# Patient Record
Sex: FEMALE | Race: WHITE | NOT HISPANIC OR LATINO | Employment: PART TIME | URBAN - METROPOLITAN AREA
[De-identification: names, ages, dates, MRNs, and addresses within clinical notes are randomized per-mention and may not be internally consistent; named-entity substitution may affect disease eponyms.]

---

## 2017-03-03 ENCOUNTER — ALLSCRIPTS OFFICE VISIT (OUTPATIENT)
Dept: OTHER | Facility: OTHER | Age: 42
End: 2017-03-03

## 2017-03-03 DIAGNOSIS — R19.5 OTHER FECAL ABNORMALITIES: ICD-10-CM

## 2017-03-03 DIAGNOSIS — F41.9 ANXIETY DISORDER: ICD-10-CM

## 2017-03-03 DIAGNOSIS — R74.02 NONSPECIFIC ELEVATION OF LEVELS OF TRANSAMINASE AND LACTIC ACID DEHYDROGENASE (LDH): ICD-10-CM

## 2017-03-03 DIAGNOSIS — F43.10 POST-TRAUMATIC STRESS DISORDER: ICD-10-CM

## 2017-03-03 DIAGNOSIS — R74.01 NONSPECIFIC ELEVATION OF LEVELS OF TRANSAMINASE AND LACTIC ACID DEHYDROGENASE (LDH): ICD-10-CM

## 2017-03-03 DIAGNOSIS — R10.9 ABDOMINAL PAIN: ICD-10-CM

## 2017-03-03 DIAGNOSIS — R68.89 OTHER GENERAL SYMPTOMS AND SIGNS: ICD-10-CM

## 2017-03-03 DIAGNOSIS — L65.9 NONSCARRING HAIR LOSS: ICD-10-CM

## 2017-03-03 DIAGNOSIS — E66.09 OTHER OBESITY DUE TO EXCESS CALORIES: ICD-10-CM

## 2017-03-03 DIAGNOSIS — K58.0 IRRITABLE BOWEL SYNDROME WITH DIARRHEA: ICD-10-CM

## 2017-03-03 DIAGNOSIS — G47.00 INSOMNIA: ICD-10-CM

## 2017-03-08 ENCOUNTER — LAB CONVERSION - ENCOUNTER (OUTPATIENT)
Dept: OTHER | Facility: OTHER | Age: 42
End: 2017-03-08

## 2017-03-08 ENCOUNTER — GENERIC CONVERSION - ENCOUNTER (OUTPATIENT)
Dept: OTHER | Facility: OTHER | Age: 42
End: 2017-03-08

## 2017-03-08 LAB
A/G RATIO (HISTORICAL): 1.6 (CALC) (ref 1–2.5)
ALBUMIN SERPL BCP-MCNC: 4.2 G/DL (ref 3.6–5.1)
ALP SERPL-CCNC: 65 U/L (ref 33–115)
ALT SERPL W P-5'-P-CCNC: 56 U/L (ref 6–29)
AST SERPL W P-5'-P-CCNC: 24 U/L (ref 10–30)
BASOPHILS # BLD AUTO: 0.6 %
BASOPHILS # BLD AUTO: 50 CELLS/UL (ref 0–200)
BILIRUB SERPL-MCNC: 0.5 MG/DL (ref 0.2–1.2)
BUN SERPL-MCNC: 9 MG/DL (ref 7–25)
BUN/CREA RATIO (HISTORICAL): ABNORMAL (CALC) (ref 6–22)
CALCIUM SERPL-MCNC: 9.1 MG/DL (ref 8.6–10.2)
CHLORIDE SERPL-SCNC: 104 MMOL/L (ref 98–110)
CHOLEST SERPL-MCNC: 212 MG/DL (ref 125–200)
CHOLEST/HDLC SERPL: 3.6 (CALC)
CO2 SERPL-SCNC: 26 MMOL/L (ref 20–31)
CREAT SERPL-MCNC: 0.74 MG/DL (ref 0.5–1.1)
DEPRECATED RDW RBC AUTO: 13.4 % (ref 11–15)
EGFR AFRICAN AMERICAN (HISTORICAL): 115 ML/MIN/1.73M2
EGFR-AMERICAN CALC (HISTORICAL): 99 ML/MIN/1.73M2
EOSINOPHIL # BLD AUTO: 1.7 %
EOSINOPHIL # BLD AUTO: 143 CELLS/UL (ref 15–500)
GAMMA GLOBULIN (HISTORICAL): 2.6 G/DL (CALC) (ref 1.9–3.7)
GLUCOSE (HISTORICAL): 93 MG/DL (ref 65–99)
HCT VFR BLD AUTO: 41.4 % (ref 35–45)
HDLC SERPL-MCNC: 59 MG/DL
HGB BLD-MCNC: 13.7 G/DL (ref 11.7–15.5)
LDL CHOLESTEROL (HISTORICAL): 137 MG/DL (CALC)
LYMPHOCYTES # BLD AUTO: 2520 CELLS/UL (ref 850–3900)
LYMPHOCYTES # BLD AUTO: 30 %
MCH RBC QN AUTO: 29.2 PG (ref 27–33)
MCHC RBC AUTO-ENTMCNC: 33 G/DL (ref 32–36)
MCV RBC AUTO: 88.6 FL (ref 80–100)
MONOCYTES # BLD AUTO: 487 CELLS/UL (ref 200–950)
MONOCYTES (HISTORICAL): 5.8 %
NEUTROPHILS # BLD AUTO: 5200 CELLS/UL (ref 1500–7800)
NEUTROPHILS # BLD AUTO: 61.9 %
NON-HDL-CHOL (CHOL-HDL) (HISTORICAL): 153 MG/DL (CALC)
PLATELET # BLD AUTO: 297 THOUSAND/UL (ref 140–400)
PMV BLD AUTO: 9.1 FL (ref 7.5–12.5)
POTASSIUM SERPL-SCNC: 4.8 MMOL/L (ref 3.5–5.3)
RBC # BLD AUTO: 4.67 MILLION/UL (ref 3.8–5.1)
SODIUM SERPL-SCNC: 138 MMOL/L (ref 135–146)
TOTAL PROTEIN (HISTORICAL): 6.8 G/DL (ref 6.1–8.1)
TRIGL SERPL-MCNC: 82 MG/DL
TSH SERPL DL<=0.05 MIU/L-ACNC: 0.88 MIU/L
WBC # BLD AUTO: 8.4 THOUSAND/UL (ref 3.8–10.8)

## 2017-03-15 ENCOUNTER — ALLSCRIPTS OFFICE VISIT (OUTPATIENT)
Dept: OTHER | Facility: OTHER | Age: 42
End: 2017-03-15

## 2017-03-22 ENCOUNTER — HOSPITAL ENCOUNTER (OUTPATIENT)
Dept: RADIOLOGY | Age: 42
Discharge: HOME | End: 2017-03-22
Payer: COMMERCIAL

## 2017-03-22 DIAGNOSIS — K58.0 IRRITABLE BOWEL SYNDROME WITH DIARRHEA: ICD-10-CM

## 2017-03-22 DIAGNOSIS — R74.01 NONSPECIFIC ELEVATION OF LEVELS OF TRANSAMINASE AND LACTIC ACID DEHYDROGENASE (LDH): ICD-10-CM

## 2017-03-22 DIAGNOSIS — R74.02 NONSPECIFIC ELEVATION OF LEVELS OF TRANSAMINASE AND LACTIC ACID DEHYDROGENASE (LDH): ICD-10-CM

## 2017-03-22 DIAGNOSIS — R10.9 ABDOMINAL PAIN: ICD-10-CM

## 2017-03-22 DIAGNOSIS — R19.5 OTHER FECAL ABNORMALITIES: ICD-10-CM

## 2017-03-22 PROCEDURE — 76700 US EXAM ABDOM COMPLETE: CPT

## 2017-03-24 ENCOUNTER — GENERIC CONVERSION - ENCOUNTER (OUTPATIENT)
Dept: OTHER | Facility: OTHER | Age: 42
End: 2017-03-24

## 2017-04-03 ENCOUNTER — TRANSCRIBE ORDERS (OUTPATIENT)
Dept: ADMINISTRATIVE | Facility: HOSPITAL | Age: 42
End: 2017-04-03

## 2017-04-03 ENCOUNTER — ALLSCRIPTS OFFICE VISIT (OUTPATIENT)
Dept: OTHER | Facility: OTHER | Age: 42
End: 2017-04-03

## 2017-04-03 DIAGNOSIS — R19.7 DIARRHEA: ICD-10-CM

## 2017-04-03 DIAGNOSIS — R74.01 NONSPECIFIC ELEVATION OF LEVELS OF TRANSAMINASE AND LACTIC ACID DEHYDROGENASE (LDH): ICD-10-CM

## 2017-04-03 DIAGNOSIS — K76.9 UNSPECIFIED CHRONIC LIVER DISEASE WITHOUT MENTION OF ALCOHOL: Primary | ICD-10-CM

## 2017-04-03 DIAGNOSIS — R74.02 NONSPECIFIC ELEVATION OF LEVELS OF TRANSAMINASE AND LACTIC ACID DEHYDROGENASE (LDH): ICD-10-CM

## 2017-04-07 ENCOUNTER — HOSPITAL ENCOUNTER (OUTPATIENT)
Dept: RADIOLOGY | Facility: HOSPITAL | Age: 42
Discharge: HOME | End: 2017-04-07
Attending: INTERNAL MEDICINE
Payer: COMMERCIAL

## 2017-04-07 DIAGNOSIS — K76.9 UNSPECIFIED CHRONIC LIVER DISEASE WITHOUT MENTION OF ALCOHOL: ICD-10-CM

## 2017-04-07 PROCEDURE — 74160 CT ABDOMEN W/CONTRAST: CPT

## 2017-04-07 RX ADMIN — IOHEXOL 100 ML: 350 INJECTION, SOLUTION INTRAVENOUS at 13:37

## 2017-04-10 ENCOUNTER — TRANSCRIBE ORDERS (OUTPATIENT)
Dept: LAB | Facility: HOSPITAL | Age: 42
End: 2017-04-10

## 2017-04-10 ENCOUNTER — APPOINTMENT (OUTPATIENT)
Dept: LAB | Facility: HOSPITAL | Age: 42
End: 2017-04-10
Attending: INTERNAL MEDICINE
Payer: COMMERCIAL

## 2017-04-10 DIAGNOSIS — R19.7 DIARRHEA, UNSPECIFIED TYPE: ICD-10-CM

## 2017-04-10 DIAGNOSIS — R19.7 DIARRHEA, UNSPECIFIED TYPE: Primary | ICD-10-CM

## 2017-04-10 DIAGNOSIS — R19.7 DIARRHEA: ICD-10-CM

## 2017-04-10 DIAGNOSIS — R74.01 NONSPECIFIC ELEVATION OF LEVELS OF TRANSAMINASE AND LACTIC ACID DEHYDROGENASE (LDH): ICD-10-CM

## 2017-04-10 DIAGNOSIS — R74.02 NONSPECIFIC ELEVATION OF LEVELS OF TRANSAMINASE AND LACTIC ACID DEHYDROGENASE (LDH): ICD-10-CM

## 2017-04-10 LAB
ALBUMIN SERPL BCP-MCNC: 4 G/DL (ref 3.5–5)
ALP SERPL-CCNC: 59 U/L (ref 46–116)
ALT SERPL W P-5'-P-CCNC: 31 U/L (ref 12–78)
ANION GAP SERPL CALCULATED.3IONS-SCNC: 8 MMOL/L (ref 4–13)
AST SERPL W P-5'-P-CCNC: 14 U/L (ref 5–45)
BILIRUB SERPL-MCNC: 0.43 MG/DL (ref 0.2–1)
BUN SERPL-MCNC: 12 MG/DL (ref 5–25)
CALCIUM SERPL-MCNC: 8.3 MG/DL (ref 8.3–10.1)
CHLORIDE SERPL-SCNC: 107 MMOL/L (ref 100–108)
CO2 SERPL-SCNC: 24 MMOL/L (ref 21–32)
CREAT SERPL-MCNC: 0.75 MG/DL (ref 0.6–1.3)
GFR SERPL CREATININE-BSD FRML MDRD: >60 ML/MIN/1.73SQ M
GLUCOSE P FAST SERPL-MCNC: 104 MG/DL (ref 65–99)
IGA SERPL-MCNC: 232 MG/DL (ref 70–400)
POTASSIUM SERPL-SCNC: 3.9 MMOL/L (ref 3.5–5.3)
PROT SERPL-MCNC: 7.4 G/DL (ref 6.4–8.2)
SODIUM SERPL-SCNC: 139 MMOL/L (ref 136–145)

## 2017-04-10 PROCEDURE — 83516 IMMUNOASSAY NONANTIBODY: CPT

## 2017-04-10 PROCEDURE — 86038 ANTINUCLEAR ANTIBODIES: CPT

## 2017-04-10 PROCEDURE — 86803 HEPATITIS C AB TEST: CPT

## 2017-04-10 PROCEDURE — 86705 HEP B CORE ANTIBODY IGM: CPT

## 2017-04-10 PROCEDURE — 82784 ASSAY IGA/IGD/IGG/IGM EACH: CPT

## 2017-04-10 PROCEDURE — 80053 COMPREHEN METABOLIC PANEL: CPT

## 2017-04-10 PROCEDURE — 86704 HEP B CORE ANTIBODY TOTAL: CPT

## 2017-04-10 PROCEDURE — 87340 HEPATITIS B SURFACE AG IA: CPT

## 2017-04-10 PROCEDURE — 36415 COLL VENOUS BLD VENIPUNCTURE: CPT

## 2017-04-11 ENCOUNTER — APPOINTMENT (OUTPATIENT)
Dept: LAB | Facility: HOSPITAL | Age: 42
End: 2017-04-11
Attending: INTERNAL MEDICINE
Payer: COMMERCIAL

## 2017-04-11 DIAGNOSIS — R19.7 DIARRHEA, UNSPECIFIED TYPE: ICD-10-CM

## 2017-04-11 DIAGNOSIS — R19.7 DIARRHEA: ICD-10-CM

## 2017-04-11 LAB
C DIFF TOX GENS STL QL NAA+PROBE: NORMAL
HBV CORE AB SER QL: REACTIVE
HBV CORE IGM SER QL: ABNORMAL
HBV SURFACE AG SER QL: ABNORMAL
HCV AB SER QL: ABNORMAL
TTG IGA SER-ACNC: <2 U/ML (ref 0–3)

## 2017-04-11 PROCEDURE — 87177 OVA AND PARASITES SMEARS: CPT

## 2017-04-11 PROCEDURE — 83993 ASSAY FOR CALPROTECTIN FECAL: CPT

## 2017-04-11 PROCEDURE — 87045 FECES CULTURE AEROBIC BACT: CPT

## 2017-04-11 PROCEDURE — 87329 GIARDIA AG IA: CPT

## 2017-04-11 PROCEDURE — 87046 STOOL CULTR AEROBIC BACT EA: CPT

## 2017-04-11 PROCEDURE — 89055 LEUKOCYTE ASSESSMENT FECAL: CPT

## 2017-04-11 PROCEDURE — 87899 AGENT NOS ASSAY W/OPTIC: CPT

## 2017-04-11 PROCEDURE — 87209 SMEAR COMPLEX STAIN: CPT

## 2017-04-11 PROCEDURE — 87493 C DIFF AMPLIFIED PROBE: CPT

## 2017-04-11 PROCEDURE — 87015 SPECIMEN INFECT AGNT CONCNTJ: CPT

## 2017-04-12 ENCOUNTER — GENERIC CONVERSION - ENCOUNTER (OUTPATIENT)
Dept: OTHER | Facility: OTHER | Age: 42
End: 2017-04-12

## 2017-04-12 LAB
G LAMBLIA AG STL QL IA: NEGATIVE
RYE IGE QN: NEGATIVE

## 2017-04-13 LAB
BACTERIA STL CULT: NORMAL
BACTERIA STL CULT: NORMAL
YERSINIA STL CULT: NORMAL

## 2017-04-14 LAB
CALPROTECTIN STL-MCNT: 109 UG/G (ref 0–120)
O+P STL CONC: NORMAL
WBC SPEC QL GRAM STN: NORMAL

## 2017-04-20 ENCOUNTER — ALLSCRIPTS OFFICE VISIT (OUTPATIENT)
Dept: OTHER | Facility: OTHER | Age: 42
End: 2017-04-20

## 2017-04-24 ENCOUNTER — GENERIC CONVERSION - ENCOUNTER (OUTPATIENT)
Dept: OTHER | Facility: OTHER | Age: 42
End: 2017-04-24

## 2017-05-17 ENCOUNTER — ANESTHESIA EVENT (OUTPATIENT)
Dept: GASTROENTEROLOGY | Facility: HOSPITAL | Age: 42
End: 2017-05-17
Payer: COMMERCIAL

## 2017-05-18 ENCOUNTER — ANESTHESIA (OUTPATIENT)
Dept: GASTROENTEROLOGY | Facility: HOSPITAL | Age: 42
End: 2017-05-18
Payer: COMMERCIAL

## 2017-05-18 ENCOUNTER — GENERIC CONVERSION - ENCOUNTER (OUTPATIENT)
Dept: OTHER | Facility: OTHER | Age: 42
End: 2017-05-18

## 2017-05-18 ENCOUNTER — HOSPITAL ENCOUNTER (OUTPATIENT)
Facility: HOSPITAL | Age: 42
Setting detail: OUTPATIENT SURGERY
Discharge: HOME | End: 2017-05-18
Attending: INTERNAL MEDICINE | Admitting: INTERNAL MEDICINE
Payer: COMMERCIAL

## 2017-05-18 VITALS
WEIGHT: 197 LBS | HEIGHT: 64 IN | TEMPERATURE: 97.1 F | RESPIRATION RATE: 16 BRPM | BODY MASS INDEX: 33.63 KG/M2 | DIASTOLIC BLOOD PRESSURE: 81 MMHG | SYSTOLIC BLOOD PRESSURE: 142 MMHG | HEART RATE: 78 BPM | OXYGEN SATURATION: 100 %

## 2017-05-18 DIAGNOSIS — R19.7 DIARRHEA: ICD-10-CM

## 2017-05-18 LAB — EXT PREGNANCY TEST URINE: NEGATIVE

## 2017-05-18 PROCEDURE — 88313 SPECIAL STAINS GROUP 2: CPT | Performed by: INTERNAL MEDICINE

## 2017-05-18 PROCEDURE — 81025 URINE PREGNANCY TEST: CPT | Performed by: ANESTHESIOLOGY

## 2017-05-18 PROCEDURE — 88305 TISSUE EXAM BY PATHOLOGIST: CPT | Performed by: INTERNAL MEDICINE

## 2017-05-18 PROCEDURE — 88342 IMHCHEM/IMCYTCHM 1ST ANTB: CPT | Performed by: INTERNAL MEDICINE

## 2017-05-18 RX ORDER — PROPOFOL 10 MG/ML
INJECTION, EMULSION INTRAVENOUS AS NEEDED
Status: DISCONTINUED | OUTPATIENT
Start: 2017-05-18 | End: 2017-05-18 | Stop reason: SURG

## 2017-05-18 RX ORDER — NYSTATIN 100000 [USP'U]/G
POWDER TOPICAL 3 TIMES DAILY
COMMUNITY
End: 2018-05-22 | Stop reason: HOSPADM

## 2017-05-18 RX ORDER — SODIUM CHLORIDE 9 MG/ML
125 INJECTION, SOLUTION INTRAVENOUS CONTINUOUS
Status: DISCONTINUED | OUTPATIENT
Start: 2017-05-18 | End: 2017-05-18 | Stop reason: HOSPADM

## 2017-05-18 RX ORDER — ALPRAZOLAM 0.5 MG/1
0.5 TABLET ORAL
COMMUNITY
End: 2018-01-30 | Stop reason: SDUPTHER

## 2017-05-18 RX ADMIN — PROPOFOL 50 MG: 10 INJECTION, EMULSION INTRAVENOUS at 08:46

## 2017-05-18 RX ADMIN — PROPOFOL 50 MG: 10 INJECTION, EMULSION INTRAVENOUS at 08:39

## 2017-05-18 RX ADMIN — PROPOFOL 50 MG: 10 INJECTION, EMULSION INTRAVENOUS at 08:48

## 2017-05-18 RX ADMIN — PROPOFOL 60 MG: 10 INJECTION, EMULSION INTRAVENOUS at 08:44

## 2017-05-18 RX ADMIN — SODIUM CHLORIDE 125 ML/HR: 0.9 INJECTION, SOLUTION INTRAVENOUS at 07:41

## 2017-05-18 RX ADMIN — PROPOFOL 100 MG: 10 INJECTION, EMULSION INTRAVENOUS at 08:35

## 2017-05-18 RX ADMIN — PROPOFOL 150 MG: 10 INJECTION, EMULSION INTRAVENOUS at 08:27

## 2017-05-18 RX ADMIN — PROPOFOL 150 MG: 10 INJECTION, EMULSION INTRAVENOUS at 08:30

## 2017-05-18 RX ADMIN — PROPOFOL 50 MG: 10 INJECTION, EMULSION INTRAVENOUS at 08:42

## 2017-05-18 RX ADMIN — SODIUM CHLORIDE: 0.9 INJECTION, SOLUTION INTRAVENOUS at 08:30

## 2017-05-18 RX ADMIN — PROPOFOL 40 MG: 10 INJECTION, EMULSION INTRAVENOUS at 08:40

## 2017-05-25 ENCOUNTER — GENERIC CONVERSION - ENCOUNTER (OUTPATIENT)
Dept: OTHER | Facility: OTHER | Age: 42
End: 2017-05-25

## 2017-07-31 ENCOUNTER — ALLSCRIPTS OFFICE VISIT (OUTPATIENT)
Dept: OTHER | Facility: OTHER | Age: 42
End: 2017-07-31

## 2017-09-12 ENCOUNTER — GENERIC CONVERSION - ENCOUNTER (OUTPATIENT)
Dept: OTHER | Facility: OTHER | Age: 42
End: 2017-09-12

## 2017-11-29 ENCOUNTER — ALLSCRIPTS OFFICE VISIT (OUTPATIENT)
Dept: OTHER | Facility: OTHER | Age: 42
End: 2017-11-29

## 2017-11-30 NOTE — PROGRESS NOTES
Assessment    1  Microscopic colitis (558 9) (K57 479)   2  Weight gain (783 1) (R63 5)    Discussion/Summary  Discussion Summary:   Microscopic colitis seems to be under well control with 3 bowel movements which are well formed daily  No need for follow-up from the he months  Would continue 6 milligrams of budesonide at this time  I am concerned regarding the will also we discussed joining the gym and dietary changes  I did also discuss it is okay for her to try VSL 3 as a probiotic for the next couple months to see if this is better than regular probiotic  Chief Complaint  Chief Complaint Free Text Note Form: Patient is in office for a routine follow up  Patient has no complaints  History of Present Illness  HPI: Rafita Oglesby is a very pleasant 38-year-old female presents here for evaluation of microscopic colitis  She is here for follow-up  Currently symptoms of microscopic colitis overall control with budesonide 6 milligrams  When we try to titrate the dose down to 3 milligrams she did not respond well associated with diarrhea and worsening symptoms  Currently having 3 bowel movements which are well formed  Of concern she has gained 10 pound since budesonide was started  She did have the benefit of improvement in the pain with budesonide  Otherwise overall doing well without any acute issues or concerns  She has recently joined a gym to help out with weight loss  Review of Systems  Complete-Female GI Adult:  Constitutional: No fever, no chills, feels well, no tiredness, no recent weight gain or weight loss  Eyes: No complaints of eye pain, no red eyes, no eyesight problems, no discharge, no dry eyes, no itching of eyes  ENT: no complaints of earache, no loss of hearing, no nose bleeds, no nasal discharge, no sore throat, no hoarseness  Cardiovascular: No complaints of slow heart rate, no fast heart rate, no chest pain, no palpitations, no leg claudication, no lower extremity edema  Respiratory: No complaints of shortness of breath, no wheezing, no cough, no SOB on exertion, no orthopnea, no PND  Gastrointestinal: No complaints of abdominal pain, no constipation, no nausea or vomiting, no diarrhea, no bloody stools  Genitourinary: No complaints of dysuria, no incontinence, no pelvic pain, no dysmenorrhea, no vaginal discharge or bleeding  Musculoskeletal: No complaints of arthralgias, no myalgias, no joint swelling or stiffness, no limb pain or swelling  Integumentary: No complaints of skin rash or lesions, no itching, no skin wounds, no breast pain or lump  Neurological: No complaints of headache, no confusion, no convulsions, no numbness, no dizziness or fainting, no tingling, no limb weakness, no difficulty walking  Psychiatric: Not suicidal, no sleep disturbance, no anxiety or depression, no change in personality, no emotional problems  Endocrine: No complaints of proptosis, no hot flashes, no muscle weakness, no deepening of the voice, no feelings of weakness  Hematologic/Lymphatic: No complaints of swollen glands, no swollen glands in the neck, does not bleed easily, does not bruise easily  ROS Reviewed:   ROS reviewed  Active Problems  1  Acute diarrhea (787 91) (R19 7)   2  Anxiety (300 00) (F41 9)   3  Back pain (724 5) (M54 9)   4  Candidiasis of breast (112 89) (B37 89)   5  Cervical radiculopathy (723 4) (M54 12)   6  Contusion of coccyx (922 32) (S30 0XXA)   7  Contusion of lower back (922 31) (S30 0XXA)   8  Depression with anxiety (300 4) (F41 8)   9  Hair thinning (704 00) (L65 9)   10  Insomnia (780 52) (G47 00)   11  Intolerance to cold (780 99) (R68 89)   12  Left Rotator Cuff Sprain (Capsule) (840 4)   13  Liver hemangioma (228 04) (D18 03)   14  Microscopic colitis (558 9) (K52 839)   15  Mixed hyperlipidemia (272 2) (E78 2)   16  Neck muscle spasm (728 85) (M62 838)   17  Neck pain (723 1) (M54 2)   18   Obesity due to excess calories, unspecified obesity severity (278 00) (E66 09)   19  Post traumatic stress disorder (309 81) (F43 10)   20  Renal calculus, right (592 0) (N20 0)   21  Splenomegaly (789 2) (R16 1)   22  Strain Of Left Biceps Tendon (840 8)   23  Strain Of Left Pectoralis Major Muscle (840 8)    Past Medical History  1  History of Abdominal cramping (789 00) (R10 9)   2  History of [employer] On [facility] [facility] (Not Motorcycle (A065 4)   3  History of Elevated ALT measurement (790 4) (R74 0)   4  History of acute gastritis (V12 70) (Z87 19)   5  History of colitis (V12 79) (Z87 19)   6  History of diarrhea (V12 79) (Z87 898)   7  History of infectious mononucleosis (V12 09) (Z86 19)   8  History of migraine (V12 49) (Z86 69)   9  History of sinusitis (V12 69) (Z87 09)   10  History of upper respiratory infection (V12 09) (Z87 09)   11  History of varicella (V12 09) (Z86 19)   12  History of Irritable bowel syndrome with diarrhea (564 1) (K58 0)   13  History of Liver lesion (573 8) (K76 9)   14  History of Mucus in stool (792 1) (R19 5)   15  History of Neck Strain (847 0)   16  Normal delivery (650) (O80,Z37 9)   17  History of Otitis media of both ears (382 9) (H66 93)   18  History of Skin Wound On The Left Thumb   19  History of Trapezius Muscle Strain (840 8)  Active Problems And Past Medical History Reviewed: The active problems and past medical history were reviewed and updated today  Surgical History  Surgical History Reviewed: The surgical history was reviewed and updated today  Family History  Mother    1  Family history of Hypertension (V17 49)   2  Family history of Hypothyroidism  Father    3  Family history of Hypertension (V17 49)  Paternal Grandmother    3  Family history of Stroke Syndrome (V17 1)  Paternal Grandfather    5  Family history of Cancer  Family History Reviewed: The family history was reviewed and updated today         Social History     · Being A Social Drinker   · History of Current Every Day Smoker (305 1)   · Former smoker (M47 50) (X26 810)  Social History Reviewed: The social history was reviewed and updated today  Current Meds   1  ALPRAZolam 0 5 MG Oral Tablet; TAKE 1/2 TO 1 TAB ONCE A DAY AS NEEDED FOR ANXIETY; Therapy: 30MXG3678 to (Last Rx:14Zre6215); Status: ACTIVE - Renewal Voided Ordered   2  Budesonide 3 MG Oral Capsule Delayed Release Particles; take 3 capsule daily; Therapy: 29QJV6844 to (Last Rx:20Nov2017)  Requested for: 20Nov2017 Ordered   3  Nystatin 955706 UNIT/GM External Cream; Apply to affected skin 2-3 times daily as needed; Therapy: 97FSP0618 to (Last Rx:03Mar2017)  Requested for: 35HBU2074 Ordered   4  Nystatin 709953 UNIT/GM External Powder; APPLY 2-3 TIMES DAILY TO AFFECTED AREA(S); Therapy: 20Apr2017 to (Evaluate:15Apr2018)  Requested for: 20Apr2017; Last Rx:20Apr2017 Ordered  Medication List Reviewed: The medication list was reviewed and updated today  Allergies  1  No Known Drug Allergies    Vitals  Vital Signs    Recorded: 57GYL1669 01:24PM   Temperature 97 6 F   Heart Rate 76   Systolic 473   Diastolic 74   Weight 571 lb    BMI Calculated 35 53   BSA Calculated 1 98   O2 Saturation 99       Physical Exam   Constitutional  General appearance: No acute distress, well appearing and well nourished  Eyes  Conjunctiva and lids: No swelling, erythema or discharge  Ears, Nose, Mouth, and Throat  External inspection of ears and nose: Normal    Oropharynx: Normal with no erythema, edema, exudate or lesions  Pulmonary  Respiratory effort: No increased work of breathing or signs of respiratory distress  Auscultation of lungs: Clear to auscultation  Cardiovascular  Palpation of heart: Normal PMI, no thrills  Auscultation of heart: Normal rate and rhythm, normal S1 and S2, without murmurs  Examination of extremities for edema and/or varicosities: Normal    Abdomen  Abdomen: Non-tender, no masses  Liver and spleen: No hepatomegaly or splenomegaly  Musculoskeletal  Gait and station: Normal    Neurologic  Cranial nerves: Cranial nerves 2-12 intact     Psychiatric  Orientation to person, place, and time: Normal          Signatures   Electronically signed by : Tamika Castillo MD; Nov 29 2017  1:46PM EST                       (Author)

## 2018-01-11 NOTE — MISCELLANEOUS
Message   Recorded as Task   Date: 09/07/2017 11:31 AM, Created By: Jocelyn Back   Task Name: Care Coordination   Assigned To: Daniel Shah   Regarding Patient: Ceci Badillo, Status: Active   CommentSarthak Damicocher - 07 Sep 2017 11:31 AM     TASK CREATED  Caller: Self; [phone] (Home)  Patient called and wanted to let you know that she increased her budesonide 3mg to 3 tabs daily since you seen her and it is helping her very well  Just an FYI        Active Problems    1  Abdominal cramping (789 00) (R10 9)   2  Acute diarrhea (787 91) (R19 7)   3  Acute gastritis (535 00) (K29 00)   4  Anxiety (300 00) (F41 9)   5  Back pain (724 5) (M54 9)   6  Candidiasis of breast (112 89) (B37 89)   7  Cervical radiculopathy (723 4) (M54 12)   8  Colitis (558 9) (K52 9)   9  Contusion of coccyx (922 32) (S30 0XXA)   10  Contusion of lower back (922 31) (S30 0XXA)   11  Depression with anxiety (300 4) (F41 8)   12  Elevated ALT measurement (790 4) (R74 0)   13  Hair thinning (704 00) (L65 9)   14  Insomnia (780 52) (G47 00)   15  Intolerance to cold (780 99) (R68 89)   16  Irritable bowel syndrome with diarrhea (564 1) (K58 0)   17  Left Rotator Cuff Sprain (Capsule) (840 4)   18  Liver hemangioma (228 04) (D18 03)   19  Liver lesion (573 8) (K76 9)   20  Microscopic colitis (558 9) (K52 839)   21  Mixed hyperlipidemia (272 2) (E78 2)   22  Mucus in stool (792 1) (R19 5)   23  Neck muscle spasm (728 85) (M62 838)   24  Neck pain (723 1) (M54 2)   25  Obesity due to excess calories, unspecified obesity severity (278 00) (E66 09)   26  Post traumatic stress disorder (309 81) (F43 10)   27  Renal calculus, right (592 0) (N20 0)   28  Splenomegaly (789 2) (R16 1)   29  Strain Of Left Biceps Tendon (840 8)   30  Strain Of Left Pectoralis Major Muscle (840 8)    Current Meds   1  ALPRAZolam 0 5 MG Oral Tablet; TAKE 1/2 TO 1 TAB ONCE A DAY AS NEEDED FOR   ANXIETY; Therapy: 41MQH0236 to (Last Rx:64Qwp1161) Ordered   2  Budesonide 3 MG Oral Capsule Delayed Release Particles; take 3 capsule daily; Therapy: 35VOP5210 to (Last Rx:26May2017)  Requested for: 01QDU0351 Ordered   3  Nystatin 211089 UNIT/GM External Cream; Apply to affected skin 2-3 times daily as   needed; Therapy: 71DFX7748 to (Last Rx:03Mar2017)  Requested for: 03KGR8189 Ordered   4  Nystatin 535968 UNIT/GM External Powder; APPLY 2-3 TIMES DAILY TO AFFECTED   AREA(S); Therapy: 20Apr2017 to (Evaluate:15Apr2018)  Requested for: 20Apr2017; Last   Rx:20Apr2017 Ordered    Allergies    1   No Known Drug Allergies    Signatures   Electronically signed by : Elie Hunter MD; Sep 13 2017 12:29PM EST                       (Author)

## 2018-01-11 NOTE — RESULT NOTES
Message   TSH WNL  CBC WNL  Fasting glucose normal at 93  ALT (liver enzyme) elevated at 56 (should be <29)  Is she having any abdominal pain, change in bowel habits? Advise to avoid alcohol and Tylenol    We will recheck this lab value  Total cholesterol elevated at 212 (should be <200)   LDL elevated at 137 (should be <100)   Triglycerides normal at 82   HDL good at 59   Recommend a low fat/ low cholesterol diet, regular exercise 30 min 5 x per week   We will also monitor her cholesterol levels      Repeat labs in 3 months- please mail these to her  Verified Results  (1) CBC/PLT/DIFF [id number] 09:34AM Ted De La Cruz     Test Name Result Flag Reference   WHITE BLOOD CELL COUNT 8 4 Thousand/uL  3 8-10 8   RED BLOOD CELL COUNT 4 67 Million/uL  3 80-5 10   HEMOGLOBIN 13 7 g/dL  11 7-15 5   HEMATOCRIT 41 4 %  35 0-45 0   MCV 88 6 fL  80 0-100 0   MCH 29 2 pg  27 0-33 0   MCHC 33 0 g/dL  32 0-36 0   RDW 13 4 %  11 0-15 0   PLATELET COUNT 629 Thousand/uL  140-400   MPV 9 1 fL  7 5-12 5   ABSOLUTE NEUTROPHILS 5200 cells/uL  3871-1427   ABSOLUTE LYMPHOCYTES 2520 cells/uL  850-3900   ABSOLUTE MONOCYTES 487 cells/uL  200-950   ABSOLUTE EOSINOPHILS 143 cells/uL     ABSOLUTE BASOPHILS 50 cells/uL  0-200   NEUTROPHILS 61 9 %     LYMPHOCYTES 30 0 %     MONOCYTES 5 8 %     EOSINOPHILS 1 7 %     BASOPHILS 0 6 %       (1) COMPREHENSIVE METABOLIC PANEL 10FPN9955 46:23XV Ted De La Cruz     Test Name Result Flag Reference   GLUCOSE 93 mg/dL  65-99   Fasting reference interval   UREA NITROGEN (BUN) 9 mg/dL  7-25   CREATININE 0 74 mg/dL  0 50-1 10   eGFR NON-AFR   AMERICAN 99 mL/min/1 73m2  > OR = 60   eGFR AFRICAN AMERICAN 115 mL/min/1 73m2  > OR = 60   BUN/CREATININE RATIO   2-27   NOT APPLICABLE (calc)   SODIUM 138 mmol/L  135-146   POTASSIUM 4 8 mmol/L  3 5-5 3   CHLORIDE 104 mmol/L     CARBON DIOXIDE 26 mmol/L  20-31   CALCIUM 9 1 mg/dL  8 6-10 2   PROTEIN, TOTAL 6 8 g/dL  6 1-8 1 ALBUMIN 4 2 g/dL  3 6-5 1   GLOBULIN 2 6 g/dL (calc)  1 9-3 7   ALBUMIN/GLOBULIN RATIO 1 6 (calc)  1 0-2 5   BILIRUBIN, TOTAL 0 5 mg/dL  0 2-1 2   ALKALINE PHOSPHATASE 65 U/L     AST 24 U/L  10-30   ALT 56 U/L H 6-29     (Q) LIPID PANEL WITH REFLEX TO DIRECT LDL 52LBM9990 09:34AM Carolyn Lee     Test Name Result Flag Reference   CHOLESTEROL, TOTAL 212 mg/dL H 125-200   HDL CHOLESTEROL 59 mg/dL  > OR = 46   TRIGLICERIDES 82 mg/dL  <323   LDL-CHOLESTEROL 137 mg/dL (calc) H <130   Desirable range <100 mg/dL for patients with CHD or  diabetes and <70 mg/dL for diabetic patients with  known heart disease  CHOL/HDLC RATIO 3 6 (calc)  < OR = 5 0   NON HDL CHOLESTEROL 153 mg/dL (calc)     Target for non-HDL cholesterol is 30 mg/dL higher than   LDL cholesterol target  (Q) TSH, 3RD GENERATION W/REFLEX TO FT4 92IUF7201 09:34AM Carolyn Lee   REPORT COMMENT:  FASTING:YES     Test Name Result Flag Reference   TSH W/REFLEX TO FT4 0 88 mIU/L     Reference Range                         > or = 20 Years  0 40-4 50                              Pregnancy Ranges            First trimester    0 26-2 66            Second trimester   0 55-2 73            Third trimester    0 43-2 91       Plan  Elevated ALT measurement    · (1) COMPREHENSIVE METABOLIC PANEL; Status:Hold For - Exact Date; Requested  for:Approx [id number];   Elevated ALT measurement, Mixed hyperlipidemia    · (1) LIPID PANEL FASTING W DIRECT LDL REFLEX; Status:Hold For - Exact Date;   Requested for:Approx [id number];

## 2018-01-12 VITALS
HEART RATE: 78 BPM | DIASTOLIC BLOOD PRESSURE: 82 MMHG | BODY MASS INDEX: 32.95 KG/M2 | HEIGHT: 64 IN | OXYGEN SATURATION: 98 % | SYSTOLIC BLOOD PRESSURE: 138 MMHG | WEIGHT: 193 LBS | TEMPERATURE: 98.4 F

## 2018-01-12 VITALS
SYSTOLIC BLOOD PRESSURE: 112 MMHG | BODY MASS INDEX: 33.53 KG/M2 | HEART RATE: 68 BPM | DIASTOLIC BLOOD PRESSURE: 72 MMHG | RESPIRATION RATE: 16 BRPM | TEMPERATURE: 98 F | WEIGHT: 196.38 LBS | HEIGHT: 64 IN

## 2018-01-12 NOTE — RESULT NOTES
Discussion/Summary   Patient biopsy results from the colon were positive for microscopic colitis with combination of lymphocytic/collagenous component to it  Will stop Lialda for nonspecific colitis and start budesonide 9 mg daily  He'll like to see her back in the office in 2 months for further evaluation and treatment plan  Depending on how she is doing any plan for a colonoscopy in 2-4 months from now  I also instructed her to stop the inducing agents which is likely NSAIDs  She is taking PPI therapy also asked her to stop this as well  Verified Results  (1) TISSUE EXAM [id number] 08:30AM Vignesh Enter     Test Name Result Flag Reference   LAB AP CASE REPORT (Report)     Surgical Pathology Report             Case: [id number]                   Authorizing Provider: Alvaro Andrew MD      Collected:      05/18/2017 0830        Ordering Location:   [facility]    Received:      05/18/2017 [address] [place] [address] Endoscopy                              Pathologist:      Cam Paredes MD                                 Specimens:  A) - Duodenum                                             B) - Ileum, ileostomy stoma, Terminal ileum bx                             C) - Large Intestine, Right/Ascending Colon, Bx right colon                      D) - Large Intestine, Left/Descending Colon, Bx left colon                       E) - Colon, Rectal bx   LAB AP FINAL DIAGNOSIS (Report)     A  Duodenum (biopsy):    - Small bowel mucosa with no significant pathologic abnormalities  - No villous atrophy, increased intraepithelial lymphocytes or crypt   hyperplasia to suggest     malabsorptive enteropathy     - No active inflammation, granulomas, organisms, dysplasia or neoplasia   identified  B  Terminal ileum (biopsy):    - Small bowel mucosa with no significant pathologic abnormalities      - No villous atrophy, increased intraepithelial lymphocytes or crypt   hyperplasia to suggest     malabsorptive enteropathy     - No active inflammation, granulomas, organisms, dysplasia or neoplasia   identified  C  Right colon (biopsy):    - Increased intraepithelial lymphocytes with focal irregularity of   basement membrane (see note)  - No granulomas, dysplasia or neoplasia identified  D  Left colon (biopsy):    - Increased intraepithelial lymphocytes with focal irregularity of   basement membrane (see note)  - No granulomas, dysplasia or neoplasia identified  E  Rectum (biopsy):    - Increased intraepithelial lymphocytes with focal irregularity of   basement membrane (see note)  - No granulomas, dysplasia or neoplasia identified  Electronically signed by Valorie Nguyen MD on 5/23/2017 at 1:47 PM  Preliminary result electronically signed by Valorie Nguyen MD on 5/22/2017 at 2:36 PM   LAB AP MICROSCOPIC DESCRIPTION      Ancillary studies (with appropriate control, part C) demonstrate:    - CD3 highlights intraepithelial T cells  - Trichrome stain highlights irregularity of basement membrane  LAB AP NOTE      - The findings suggest microscopic colitis (favor collagenous subtype) in   the appropriate clinical and endoscopic context  - Interpretation performed at [facility], [address] [place] [address]  LAB AP SURGICAL ADDITIONAL INFORMATION (Report)     These tests were developed and their performance characteristics   determined by [facility] or [place]  They may not be cleared or approved by the [place]  Food and   Drug Administration  The FDA has determined that such clearance or   approval is not necessary  These tests are used for clinical purposes  They should not be regarded as investigational or for research  This   laboratory has been approved by CLIA 88, designated as a high-complexity   laboratory and is qualified to perform these tests  LAB AP GROSS DESCRIPTION (Report)     A   The specimen is received in formalin, labeled with the patient's name   and hospital number, and is designated duodenum  The specimen consists   of multiple tan to yellow soft tissue fragments measuring in aggregate 0 5   x 0 4 x 0 1 cm  Entirely submitted  One cassette  Note: The estimated total formalin fixation time based upon information   provided by the submitting clinician and the standard processing schedule   is 29 75 hours  B  The specimen is received in formalin, labeled with the patient's name   and hospital number, and is designated terminal ileum biopsy  The   specimen consists of 2 tan soft tissue fragments measuring 0 3 and 0 4 cm  Entirely submitted  One cassette  C  The specimen is received in formalin, labeled with the patient's name   and hospital number, and is designated biopsy right colon  The specimen   consists of multiple tan soft tissue fragments measuring in aggregate 0 6   x 0 5 x 0 1 cm  Entirely submitted  One cassette  D  The specimen is received in formalin, labeled with the patient's name   and hospital number, and is designated biopsy left colon  The specimen   consists of multiple tan soft tissue fragments measuring in aggregate 0 5   x 0 5 x 0 1 cm  Entirely submitted  One cassette  E  The specimen is received in formalin, labeled with the patient's name   and hospital number, and is designated rectal biopsy  The specimen   consists of multiple tan-pink soft tissue fragments measuring in aggregate   0 4 x 0 4 x 0 1 cm  Entirely submitted  One cassette  Note: The estimated total formalin fixation time based upon information   provided by the submitting clinician and the standard processing schedule   is 29 25 hours      MAC   LAB AP CLINICAL INFORMATION Diarrhea

## 2018-01-13 VITALS
BODY MASS INDEX: 34.31 KG/M2 | HEIGHT: 64 IN | WEIGHT: 201 LBS | DIASTOLIC BLOOD PRESSURE: 80 MMHG | TEMPERATURE: 98 F | HEART RATE: 60 BPM | SYSTOLIC BLOOD PRESSURE: 110 MMHG | RESPIRATION RATE: 16 BRPM

## 2018-01-13 VITALS
WEIGHT: 196 LBS | SYSTOLIC BLOOD PRESSURE: 150 MMHG | RESPIRATION RATE: 12 BRPM | HEIGHT: 64 IN | BODY MASS INDEX: 33.46 KG/M2 | DIASTOLIC BLOOD PRESSURE: 102 MMHG | HEART RATE: 60 BPM | TEMPERATURE: 98.1 F

## 2018-01-13 VITALS
BODY MASS INDEX: 35.53 KG/M2 | DIASTOLIC BLOOD PRESSURE: 74 MMHG | OXYGEN SATURATION: 99 % | HEART RATE: 76 BPM | WEIGHT: 207 LBS | TEMPERATURE: 97.6 F | SYSTOLIC BLOOD PRESSURE: 124 MMHG

## 2018-01-13 VITALS
DIASTOLIC BLOOD PRESSURE: 74 MMHG | HEART RATE: 75 BPM | OXYGEN SATURATION: 98 % | SYSTOLIC BLOOD PRESSURE: 112 MMHG | HEIGHT: 64 IN | BODY MASS INDEX: 33.8 KG/M2 | WEIGHT: 198 LBS | TEMPERATURE: 97.9 F

## 2018-01-13 NOTE — RESULT NOTES
Verified Results  * US ABDOMEN COMPLETE [id number] 11:12AM Lisha Lopez Order Number: [id number]    - Patient Instructions: To schedule this appointment, please contact Central Scheduling at [phone]  Test Name Result Flag Reference   US ABDOMEN COMPLETE (Report)     ABDOMEN ULTRASOUND, COMPLETE     INDICATION: Elevated levels of transaminase and lactic acid dehydrogenase  COMPARISON: Ultrasound 2/28/2008, CT abdomen and pelvis 1/7/2007     TECHNIQUE:  Real-time ultrasound of the abdomen was performed with a curvilinear transducer with both volumetric sweeps and still imaging techniques  FINDINGS:     PANCREAS: Visualized portions of the pancreas are within normal limits  AORTA AND IVC: Visualized portions are normal for patient age  LIVER:   Size: Within normal range  The liver measures 15 4 cm in the midclavicular line  Contour: Surface contour is smooth  Parenchyma: Echogenicity and echotexture are within normal limits  11 mm echogenic nodule is seen in the left lobe, probably hemangioma  Although not clearly seen on the previous ultrasound, a 7 mm hypodensity was noted in a similar region on the prior CT study, favored to represent hemangioma on that exam  6 mm left    lobe cyst    An additional right lobe hemangioma seen on prior CT imaging is not evident on today's ultrasound  The main portal vein is patent and hepatopetal       BILIARY:   The gallbladder is normal in caliber  No wall thickening or pericholecystic fluid  No stones or sludge identified  Sonographic Junious [place] sign is negative  No intrahepatic biliary dilatation  CBD measures 3 mm  No choledocholithiasis  KIDNEY:    Right kidney measures 12 1 x 4 2 cm  9 mm nonobstructing lower pole calculus  No hydronephrosis or solid mass  Left kidney measures 11 6 x 5 4 cm  Within normal limits  SPLEEN:    Measures 13 5 cm  Mildly enlarged  ASCITES: None         IMPRESSION: Probable small left hepatic lobe hemangioma and tiny cyst      Mild splenomegaly  9 mm nonobstructing right renal calculus         Workstation performed: [id number]     Signed by:   Miguel Ángel Ragsdale DO   3/24/17

## 2018-01-15 NOTE — RESULT NOTES
Discussion/Summary   stool studies negative for Giardia     Verified Results  (1) GIARDIA LAMBLIA [id number] 08:27AM Sanders Dux Order Number: [id number]     Test Name Result Flag Reference   GIARDIA LAMBLIA Negative  Negative   Performed at:  [address] [place] [address]  [id number]  : Tammy Santos MD, Phone:  [phone]

## 2018-01-15 NOTE — MISCELLANEOUS
Message  GI Reminder Recall Olive Dates:   Date: 09/12/2017   Dear Ame Dixon:     Review of our records shows you are due for the following: Follow Up Visit  Please call the following office to schedule your appointment:   [address], [address], [place], [place], [address] [facility] [phone]  We look forward to hearing from you!      Sincerely,     [facility]      Signatures   Electronically signed by : Nallely Bobo, ; Sep 12 2017  3:03PM EST                       (Author)

## 2018-01-15 NOTE — RESULT NOTES
Discussion/Summary   Stool studies are negative  Verified Results  (1) STOOL WBCS 11Apr2017 08:27AM Sonda Iha   TW Order Number: [id number]     Test Name Result Flag Reference   STOOL WBC   None Seen   Rare white blood cells  Performed at:  [address] [place] [address] [place] [address], [place], [employer]  [id number]  : Stanislaw Mendoza MD, Phone:  [phone]     (1) OVA AND PARASITES EXAM 11Apr2017 08:27AM Exie Re Order Number: [id number]     Test Name Result Flag Reference   O&P CONC  EXAM      No ova, cysts, or parasites seen     One negative specimen does not rule out the possibility of a  parasitic infection  Performing Comments: 3      Performed at:  [facility] [place] [address], [place], [employer]  [id number]  : Stanislaw Mendoza MD, Phone:  [phone]     (1) STOOL CULTURE 11Apr2017 08:27AM Exie Re Order Number: [id number]     Test Name Result Flag Reference   CLINICAL REPORT (Report)     Test:        Stool culture  Specimen Type:   Stool  Specimen Date:   4/11/2017 8:27 AM  Result Date:    4/13/2017 7:47 AM  Result Status:   Final result  Resulting Lab:   [facility] [address] [place] [address]            Tel: [phone]      CULTURE                                       ------------------                                   No Salmonella, Shigella or Campylobacter isolated      Shiga Toxin 1 NOT detected, Shiga Toxin 2 NOT detected     (1) CULTURE, STOOL Vaishali Distel 11Apr2017 08:27AM Sonda Iha     Test Name Result Flag Reference   CLINICAL REPORT (Report)     Test:        Yersinia culture, stool  Specimen Type:   Stool  Specimen Date:   4/11/2017 8:27 AM  Result Date:    4/13/2017 7:38 AM  Result Status:   Final result  Resulting Lab:   [facility] [address] [place] [address]            Tel: [phone]      CULTURE ------------------                                   No Yersinia isolated     (1) CALPROTECTIN, FECAL [id number] 08:27AM Daniel Yan     Test Name Result Flag Reference   CALPROTECTIN, FECAL 109 ug/g  0 - 120   Performed at:  [address]  [id number]  : Adams Veronica MD, Phone:  [phone]

## 2018-01-18 NOTE — RESULT NOTES
Verified Results  (1) C  DIFFICILE TOXIN BY PCR 11Apr2017 08:27AM [facility] [place]   TW Order Number: [id number]     Test Name Result Flag Reference   C  DIFFICILE TOXIN BY PCR   NEGATIVE for C difficle toxin by PCR  NEGATIVE for C difficle toxin by PCR  (1) COMPREHENSIVE METABOLIC PANEL [id number] 10:93YN Connor Borges Order Number: [id number]     Test Name Result Flag Reference   SODIUM 139 mmol/L  136-145   POTASSIUM 3 9 mmol/L  3 5-5 3   CHLORIDE 107 mmol/L  100-108   CARBON DIOXIDE 24 mmol/L  21-32   ANION GAP (CALC) 8 mmol/L  4-13   BLOOD UREA NITROGEN 12 mg/dL  5-25   CREATININE 0 75 mg/dL  0 60-1 30   Standardized to IDMS reference method   CALCIUM 8 3 mg/dL  8 3-10 1   BILI, TOTAL 0 43 mg/dL  0 20-1 00   ALK PHOSPHATAS 59 U/L     ALT (SGPT) 31 U/L  12-78   AST(SGOT) 14 U/L  5-45   ALBUMIN 4 0 g/dL  3 5-5 0   This is a corrected result  Previous result was 2 0 g/dL on 4/10/2017 at 1001 EDT   TOTAL PROTEIN 7 4 g/dL  6 4-8 2   eGFR Non-African American      >60 0 ml/min/1 73sq m   [facility] Disease Education Program recommendations are as follows:  GFR calculation is accurate only with a steady state creatinine  Chronic Kidney disease less than 60 ml/min/1 73 sq  meters  Kidney failure less than 15 ml/min/1 73 sq  meters     GLUCOSE FASTING 104 mg/dL H 65-99     (1) CHRONIC HEPATITIS PANEL 10Apr2017 08:37AM [facility] [place]   TW Order Number: [id number]     Test Name Result Flag Reference   HEPATITIS B SURFACE ANTIGEN Non-reactive  Non-reactive, NonReactive - Confirmed   HEPATITIS C ANTIBODY Non-reactive  Non-reactive   HEPATITIS B CORE IGM ANTIBODY Non-reactive  Non-reactive   HEPATITIS B CORE TOTAL ANTIBODY Reactive A Non-reactive     (1) IGA 10Apr2017 08:37AM Connor Borges Order Number: [id number]     Test Name Result Flag Reference    0 mg/dL  70 0-400 0     (1) TISSUE TRANSGLUTAMINASE IGA [id number] 08:37AM [facility]     Test Name Result Flag Reference   tTG IGA <2 U/mL  0 - 3   Negative        0 -  3                                Weak Positive   4 - 10                                Positive           >10   Tissue Transglutaminase (tTG) has been identified   as the endomysial antigen  Studies have demonstr-   ated that endomysial IgA antibodies have over 99%   specificity for gluten sensitive enteropathy  Performed at:  [facility] [employer] [address]  [id number]  : Lachelle Foreman MD, Phone:  [phone]     [address] [place] [address] 07Apr2017 12:25PM Josesito Holiday     Test Name Result Flag Reference   CT ABDOMEN W CONTRAST (Report)     CT ABDOMEN WITH IV CONTRAST     INDICATION: Liver lesion found on ultrasound  Further characterization  COMPARISON: January 7, 2007 as well as an ultrasound from 3/22/2017     TECHNIQUE: CT examination of the abdomen was performed  Contrast was injected one time intravenously without immediate complication  Scanning through the abdomen was performed in arterial, venous and delayed phases according a protocol spefically    designed to evaluate upper abdominal viscera  Reformatted images were created in axial, sagittal, and coronal planes  Radiation dose length product (DLP) for this visit: 1201 46 mGy-cm   This examination, like all CT scans performed in the [facility], was performed utilizing techniques to minimize radiation dose exposure, including the use of    iterative reconstruction and automated exposure control  IV Contrast: 100 mL of iohexol (OMNIPAQUE)        Enteric Contrast: Enteric contrast was administered  FINDINGS:     ABDOMEN     LOWER CHEST: No significant abnormality in the lung bases  LIVER/BILIARY TREE: Anteriorly in the left hepatic lobe there is a tiny round hypodense focus which is about 4 mm and is new since the prior CT scan but seems to correspond to the tiny cyst seen on ultrasound     Also in the left lobe of the liver is a round hypodense lesion which is 9 mm diameter and is unchanged in appearance from the prior CT from about 10 years ago and is compatible with a benign finding such as an atypical cavernous hemangioma  It is low    density during arterial and portal venous phase images and fills in on delayed phase images does not demonstrate the typical peripheral nodular enhancement  A 3rd lesion along the medial right hepatic margin which was present on the prior CT scan seems much less conspicuous today and is visible only as a faint small hypodense region  Presumably a benign finding given its significant decrease in size  There   are no concerning liver lesions seen on this exam      GALLBLADDER: No calcified gallstones  No pericholecystic inflammatory change  SPLEEN: Unremarkable  PANCREAS: Unremarkable  ADRENAL GLANDS: Unremarkable  KIDNEYS/URETERS: 4 mm nonobstructing right lower pole calculus is noted  Otherwise, kidneys and ureters are unremarkable  VISUALIZED STOMACH AND BOWEL: Unremarkable  ABDOMINAL CAVITY: No ascites or free intraperitoneal air  No lymphadenopathy  VESSELS: Unremarkable for patient's age  ABDOMINAL WALL: Unremarkable  OSSEOUS STRUCTURES: No acute fracture or destructive osseous lesion  IMPRESSION:     Benign findings in the liver as discussed above including a tiny cyst and a stable benign atypical cavernous hemangioma in the left lobe  A right lobe lesion which was present on the study from 2007 has nearly resolved  Workstation performed: [id number]     Signed by:   Alecia Lopez MD   4/11/17       Plan  Elevated ALT measurement    · (1) HEP B SURFACE ANTIBODY; Status:Active; Requested for:12Apr2017;    · (1) HEP BE ANTIBODY; Status:Active; Requested for:12Apr2017;    · (1) HEP BE ANTIGEN; Status:Active; Requested for:12Apr2017;    · (Q) HEPATITIS B VIRUS DNA,QN PCR W/REFL TO HBV GENOTYPE; Status:Active;   Requested for:12Apr2017;

## 2018-01-30 DIAGNOSIS — F41.9 ANXIETY: Primary | ICD-10-CM

## 2018-01-30 RX ORDER — ALPRAZOLAM 0.5 MG/1
0.5 TABLET ORAL
Qty: 30 TABLET | Refills: 0 | Status: SHIPPED | OUTPATIENT
Start: 2018-01-30 | End: 2018-03-01 | Stop reason: SDUPTHER

## 2018-02-22 ENCOUNTER — OFFICE VISIT (OUTPATIENT)
Dept: FAMILY MEDICINE CLINIC | Facility: CLINIC | Age: 43
End: 2018-02-22
Payer: COMMERCIAL

## 2018-02-22 VITALS
RESPIRATION RATE: 16 BRPM | WEIGHT: 204.6 LBS | TEMPERATURE: 98.2 F | DIASTOLIC BLOOD PRESSURE: 92 MMHG | HEART RATE: 64 BPM | BODY MASS INDEX: 34.93 KG/M2 | SYSTOLIC BLOOD PRESSURE: 152 MMHG | HEIGHT: 64 IN

## 2018-02-22 DIAGNOSIS — F41.9 ANXIETY AND DEPRESSION: Primary | ICD-10-CM

## 2018-02-22 DIAGNOSIS — F32.A ANXIETY AND DEPRESSION: Primary | ICD-10-CM

## 2018-02-22 PROBLEM — N20.0 RENAL CALCULUS, RIGHT: Status: ACTIVE | Noted: 2017-03-24

## 2018-02-22 PROBLEM — E78.2 MIXED HYPERLIPIDEMIA: Status: ACTIVE | Noted: 2017-03-08

## 2018-02-22 PROBLEM — E66.09 OBESITY DUE TO EXCESS CALORIES, UNSPECIFIED OBESITY SEVERITY: Status: ACTIVE | Noted: 2017-03-03

## 2018-02-22 PROBLEM — D18.03 LIVER HEMANGIOMA: Status: ACTIVE | Noted: 2017-03-24

## 2018-02-22 PROBLEM — R16.1 SPLENOMEGALY: Status: ACTIVE | Noted: 2017-03-24

## 2018-02-22 PROBLEM — K52.839 MICROSCOPIC COLITIS: Status: ACTIVE | Noted: 2017-07-31

## 2018-02-22 PROCEDURE — 99214 OFFICE O/P EST MOD 30 MIN: CPT | Performed by: NURSE PRACTITIONER

## 2018-02-22 RX ORDER — CALCIPOTRIENE AND BETAMETHASONE DIPROPIONATE 50; .5 UG/G; MG/G
AEROSOL, FOAM TOPICAL
COMMUNITY
Start: 2018-01-12 | End: 2018-05-22 | Stop reason: HOSPADM

## 2018-02-22 RX ORDER — BUDESONIDE 3 MG/1
3 CAPSULE, COATED PELLETS ORAL DAILY
COMMUNITY
Start: 2017-05-26 | End: 2018-03-15 | Stop reason: SDUPTHER

## 2018-02-22 RX ORDER — PAROXETINE HYDROCHLORIDE 12.5 MG/1
12.5 TABLET, FILM COATED, EXTENDED RELEASE ORAL DAILY
Qty: 30 TABLET | Refills: 0 | Status: SHIPPED | OUTPATIENT
Start: 2018-02-22 | End: 2018-03-22 | Stop reason: DRUGHIGH
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
## 2018-02-22 NOTE — PATIENT INSTRUCTIONS
Anxiety   AMBULATORY CARE:   Anxiety  is a condition that causes you to feel extremely worried or nervous  The feelings are so strong that they can cause problems with your daily activities or sleep  Anxiety may be triggered by something you fear, or it may happen without a cause  Family or work stress, smoking, caffeine, and alcohol can increase your risk for anxiety  Certain medicines or health conditions can also increase your risk  Anxiety can become a long-term condition if it is not managed or treated  Common signs and symptoms that may occur with anxiety:   · Fatigue or muscle tightness     · Shaking, restlessness, or irritability     · Problems focusing     · Trouble sleeping     · Feeling jumpy, easily startled, or dizzy     · Rapid heartbeat or shortness of breath  Call 911 if:   · You have chest pain, tightness, or heaviness that may spread to your shoulders, arms, jaw, neck, or back  · You feel like hurting yourself or someone else  Contact your healthcare provider if:   · Your symptoms get worse or do not get better with treatment  · You think your medicine may be causing side effects  · Your anxiety keeps you from doing your regular daily activities  · You have new symptoms since your last visit  · You have questions or concerns about your condition or care  Treatment for anxiety  may include medicines to help you feel calm and relaxed, and decrease your symptoms  Medicines are usually given together with therapy or other treatments  Manage anxiety:   · Talk to someone about your anxiety  Your healthcare provider may suggest counseling  Cognitive behavioral therapy can help you understand and change how you react to events that trigger your symptoms  You might feel more comfortable talking with a friend or family member about your anxiety  Choose someone you know will be supportive and encouraging  · Find ways to relax    Activities such as exercise, meditation, or listening to music can help you relax  Spend time with friends, or do things you enjoy  · Practice deep breathing  Deep breathing can help you relax when you feel anxious  Focus on taking slow, deep breaths several times a day, or during an anxiety attack  Breathe in through your nose and out through your mouth  · Create a regular sleep routine  Regular sleep can help you feel calmer during the day  Go to sleep and wake up at the same times every day  Do not watch television or use the computer right before bed  Your room should be comfortable, dark, and quiet  · Eat a variety of healthy foods  Healthy foods include fruits, vegetables, low-fat dairy products, lean meats, fish, whole-grain breads, and cooked beans  Healthy foods can help you feel less anxious and have more energy  · Exercise regularly  Exercise can increase your energy level  Exercise may also lift your mood and help you sleep better  Your healthcare provider can help you create an exercise plan  · Do not smoke  Nicotine and other chemicals in cigarettes and cigars can increase anxiety  Ask your healthcare provider for information if you currently smoke and need help to quit  E-cigarettes or smokeless tobacco still contain nicotine  Talk to your healthcare provider before you use these products  · Do not have caffeine  Caffeine can make your symptoms worse  Do not have foods or drinks that are meant to increase your energy level  · Limit or do not drink alcohol  Ask your healthcare provider if alcohol is safe for you  You may not be able to drink alcohol if you take certain anxiety or depression medicines  Limit alcohol to 1 drink per day if you are a woman  Limit alcohol to 2 drinks per day if you are a man  A drink of alcohol is 12 ounces of beer, 5 ounces of wine, or 1½ ounces of liquor  · Do not use drugs  Drugs can make your anxiety worse  It can also make anxiety hard to manage   Talk to your healthcare provider if you use drugs and want help to quit  Follow up with your healthcare provider as directed:  Write down your questions so you remember to ask them during your visits  © 2017 [address] [employer] Information is for End User's use only and may not be sold, redistributed or otherwise used for commercial purposes  All illustrations and images included in CareNotes® are the copyrighted property of A D A M , Inc  or Isaac Perez  The above information is an  only  It is not intended as medical advice for individual conditions or treatments  Talk to your doctor, nurse or pharmacist before following any medical regimen to see if it is safe and effective for you

## 2018-02-23 ENCOUNTER — OFFICE VISIT (OUTPATIENT)
Dept: BEHAVIORAL/MENTAL HEALTH CLINIC | Facility: CLINIC | Age: 43
End: 2018-02-23
Payer: COMMERCIAL

## 2018-02-23 ENCOUNTER — TELEPHONE (OUTPATIENT)
Dept: PSYCHIATRY | Facility: CLINIC | Age: 43
End: 2018-02-23

## 2018-02-23 DIAGNOSIS — F41.8 DEPRESSION WITH ANXIETY: Primary | ICD-10-CM

## 2018-02-23 PROCEDURE — 90832 PSYTX W PT 30 MINUTES: CPT | Performed by: SOCIAL WORKER

## 2018-02-23 NOTE — TELEPHONE ENCOUNTER
Behavorial Health Outpatient Intake Questions    Referred by: Osman Alexander with provider before scheduling    Are there any developmental disabilities? No    Does the patient have hearing impairment? No    Does the patient have ICM or CTT? No    Taking injectable psychiatric medications? NoIf yes, patient can not be seen here  Has the patient ever seen or currently see a psychiatrist? Yes If yes who/when? At 8 yrs old    Has the patient ever seen or currently see a therapist? Yes If yes who/when? At 8 yrs old    How many visits did the pt have for previous psychiatric treatment?  History    Has the patient served in the [place] [address]? No    If yes, have you had combat services? No    Was the patient activated into federal active duty as a member of the national guard or reserve? No    Minor Child    Who has custody of the child? Is there a custody agreement? If there is a custody agreement remind parent that they must bring a copy to the first appt or they will not be seen  Behavorial Health Outpatient Intake History     Presenting Problem (in patient's words) some form of depression and anxiety, ptsd    Substance Abuse:No concerns of substance abuse are reported  Has the patient been seen here previously, either inpatient or outpatient? No outpatient    If seen as outpatient, what provider(s) did the patient see? A member of the patient's family has been in therapy here with     ACCEPTED as a patient Yes Appointment Date: Jo Ann Ortega 18 @ 2pm    Referred Elsewhere?  No    Primary Care Physician: Eulalio Raymundo MD    PCP telephone number: [phone]    [facility]  ----------------------------------------------------------------------------------------------------------------------    Insurance subscriber:   Rizwana MAZARIEGOS; [birth year]    Address: Phone:                                   SSN:    Employer: [employer]                                                     Address:  ----------------------------------------------------------------------------------------------------------------------    Primary Insurance:      Y[employer]ANNA                                                             Phone: [phone]    ID number:     [id number]                                             Group number: [id number]----------------------------------------------------------------------------------------------------------------------    Secondary Insurance:                                                               Phone:    ID number:                                                   Group number:  ----------------------------------------------------------------------------------------------------------------------    Other insurance information:             _______________________________________

## 2018-02-23 NOTE — TELEPHONE ENCOUNTER
----- Message from Muna Whyte sent at 2/23/2018  9:08 AM EST -----  Contact: [phone]  Would like her to see Willa Escobar as she and her daughter saw her in past  [facility]

## 2018-02-23 NOTE — PATIENT INSTRUCTIONS
Provided supportive therapy  Reviewed coping strategies for mood and anxiety issues  Agreeable to referral to see therapist via [facility] and completed referral  Offered additional sessions with me to bridge any gaps until seen
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
## 2018-03-01 ENCOUNTER — TELEPHONE (OUTPATIENT)
Dept: FAMILY MEDICINE CLINIC | Facility: CLINIC | Age: 43
End: 2018-03-01

## 2018-03-01 DIAGNOSIS — F41.9 ANXIETY: ICD-10-CM

## 2018-03-01 RX ORDER — ALPRAZOLAM 0.5 MG/1
0.5 TABLET ORAL
Qty: 30 TABLET | Refills: 0 | Status: SHIPPED | OUTPATIENT
Start: 2018-03-01 | End: 2018-03-29 | Stop reason: SDUPTHER

## 2018-03-15 DIAGNOSIS — K52.839 MICROSCOPIC COLITIS, UNSPECIFIED MICROSCOPIC COLITIS TYPE: Primary | ICD-10-CM

## 2018-03-15 RX ORDER — BUDESONIDE 3 MG/1
9 CAPSULE, COATED PELLETS ORAL DAILY
Qty: 90 CAPSULE | Refills: 0 | Status: SHIPPED | OUTPATIENT
Start: 2018-03-15 | End: 2018-06-08 | Stop reason: SDUPTHER

## 2018-03-22 ENCOUNTER — OFFICE VISIT (OUTPATIENT)
Dept: FAMILY MEDICINE CLINIC | Facility: CLINIC | Age: 43
End: 2018-03-22
Payer: COMMERCIAL

## 2018-03-22 VITALS
WEIGHT: 202.8 LBS | TEMPERATURE: 97.4 F | SYSTOLIC BLOOD PRESSURE: 134 MMHG | HEART RATE: 64 BPM | RESPIRATION RATE: 16 BRPM | DIASTOLIC BLOOD PRESSURE: 88 MMHG | BODY MASS INDEX: 34.81 KG/M2

## 2018-03-22 DIAGNOSIS — F41.9 ANXIETY: ICD-10-CM

## 2018-03-22 DIAGNOSIS — G47.00 INSOMNIA, UNSPECIFIED TYPE: ICD-10-CM

## 2018-03-22 DIAGNOSIS — F32.2 SEVERE SINGLE CURRENT EPISODE OF MAJOR DEPRESSIVE DISORDER, WITHOUT PSYCHOTIC FEATURES (HCC): Primary | ICD-10-CM

## 2018-03-22 PROCEDURE — 99214 OFFICE O/P EST MOD 30 MIN: CPT | Performed by: NURSE PRACTITIONER

## 2018-03-22 RX ORDER — PAROXETINE HYDROCHLORIDE 25 MG/1
25 TABLET, FILM COATED, EXTENDED RELEASE ORAL EVERY MORNING
Qty: 30 TABLET | Refills: 5 | Status: SHIPPED | OUTPATIENT
Start: 2018-03-22 | End: 2018-05-15 | Stop reason: SDUPTHER

## 2018-03-22 NOTE — PATIENT INSTRUCTIONS
Anxiety   WHAT YOU NEED TO KNOW:   What do I need to know about anxiety? Anxiety is a condition that causes you to feel extremely worried or nervous  The feelings are so strong that they can cause problems with your daily activities or sleep  Anxiety may be triggered by something you fear, or it may happen without a cause  Family or work stress, smoking, caffeine, and alcohol can increase your risk for anxiety  Certain medicines or health conditions can also increase your risk  Anxiety can become a long-term condition if it is not managed or treated  What other common signs and symptoms may occur with anxiety? · Fatigue or muscle tightness     · Shaking, restlessness, or irritability     · Problems focusing     · Trouble sleeping     · Feeling jumpy, easily startled, or dizzy     · Rapid heartbeat or shortness of breath  What do I need to tell my healthcare provider about my anxiety? Tell your healthcare provider when your symptoms began and what triggers them  Tell your provider if anxiety affects your daily activities  Your provider will also ask about your medical history and if you have family members with a similar condition  Tell your provider about your past and present alcohol, nicotine, or drug use  What can I do to manage anxiety? You may get medicines to help you feel calm and relaxed, and to decrease your symptoms  Medicines are usually given together with therapy or other treatments  The following can help you manage anxiety:  · Talk to someone about your anxiety  Your healthcare provider may suggest counseling  Cognitive behavioral therapy can help you understand and change how you react to events that trigger your symptoms  You might feel more comfortable talking with a friend or family member about your anxiety  Choose someone you know will be supportive and encouraging  · Find ways to relax  Activities such as exercise, meditation, or listening to music can help you relax   Spend time with friends, or do things you enjoy  · Practice deep breathing  Deep breathing can help you relax when you feel anxious  Focus on taking slow, deep breaths several times a day, or during an anxiety attack  Breathe in through your nose and out through your mouth  · Create a regular sleep routine  Regular sleep can help you feel calmer during the day  Go to sleep and wake up at the same times every day  Do not watch television or use the computer right before bed  Your room should be comfortable, dark, and quiet  · Eat a variety of healthy foods  Healthy foods include fruits, vegetables, low-fat dairy products, lean meats, fish, whole-grain breads, and cooked beans  Healthy foods can help you feel less anxious and have more energy  · Exercise regularly  Exercise can increase your energy level  Exercise may also lift your mood and help you sleep better  Your healthcare provider can help you create an exercise plan  · Do not smoke  Nicotine and other chemicals in cigarettes and cigars can increase anxiety  Ask your healthcare provider for information if you currently smoke and need help to quit  E-cigarettes or smokeless tobacco still contain nicotine  Talk to your healthcare provider before you use these products  · Do not have caffeine  Caffeine can make your symptoms worse  Do not have foods or drinks that are meant to increase your energy level  · Limit or do not drink alcohol  Ask your healthcare provider if alcohol is safe for you  You may not be able to drink alcohol if you take certain anxiety or depression medicines  Limit alcohol to 1 drink per day if you are a woman  Limit alcohol to 2 drinks per day if you are a man  A drink of alcohol is 12 ounces of beer, 5 ounces of wine, or 1½ ounces of liquor  · Do not use drugs  Drugs can make your anxiety worse  It can also make anxiety hard to manage  Talk to your healthcare provider if you use drugs and want help to quit    Call 911 if:   · You have chest pain, tightness, or heaviness that may spread to your shoulders, arms, jaw, neck, or back  · You feel like hurting yourself or someone else  When should I contact my healthcare provider? · Your symptoms get worse or do not get better with treatment  · Your anxiety keeps you from doing your regular daily activities  · You have new symptoms since your last visit  · You have questions or concerns about your condition or care  CARE AGREEMENT:   You have the right to help plan your care  Learn about your health condition and how it may be treated  Discuss treatment options with your caregivers to decide what care you want to receive  You always have the right to refuse treatment  The above information is an  only  It is not intended as medical advice for individual conditions or treatments  Talk to your doctor, nurse or pharmacist before following any medical regimen to see if it is safe and effective for you  © 2017 [address] [employer] Information is for End User's use only and may not be sold, redistributed or otherwise used for commercial purposes  All illustrations and images included in CareNotes® are the copyrighted property of A D A M [employer]  or Isaac Perez

## 2018-03-22 NOTE — PROGRESS NOTES
Chief Complaint   Patient presents with    Follow-up     4 week follow up anxiety and depression  Assessment/Plan:    PHQ 9 now up to 22  MARILY 7 score at 19     We will increase Paxil CR 12 5 mg to Paxil CR 25 mg daily  She may continue Xanax 0 5 mg 1/2 tab BID prn (this was filled on 3/1/18 for 30 tabs)     Her appointment with Renetta Anderson, therapist Sharon Wheeler, isn't until 4/30/18  I recommended she see Guerrero Alston LCSW in the mean time  We scheduled her with Savanna Allen for tomorrow, 3/23/18 at 11:30am     We also discussed the [facility] at length today- I think she would greatly benefit from this   She would like to think about this for now   I did print her information regarding the Partial Program today     She'll call me in 2 weeks with an update      Diagnoses and all orders for this visit:    Severe single current episode of major depressive disorder, without psychotic features (HCC)  -     PARoxetine (PAXIL-CR) 25 mg 24 hr tablet; Take 1 tablet (25 mg total) by mouth every morning    Insomnia, unspecified type    Anxiety          Subjective:      Patient ID: Wylene Kocher is a 38 y o  female      HPI   Pt presents by herself today for a 4 week follow up of depression and anxiety     She was started on Paxil ER 12 5 mg 4 weeks ago  Has Xanax 0 5 mg 1/2 tab 1-2 times daily prn     She was evaluated by Guerrero Alston LCSW on 2/23/18 and she will be seeing Sharon Wheeler with Renetta Anderson on 4/30/18    She reports her depression and anxiety are not improved  Initially after starting the Paxil she did feel some improvement but this was short lived     She has been sleeping slightly better with the Paxil     Still has no motivation, doesn't want to get out of bed  Cries frequently   Isolating herself more      is somewhat supportive     Denies SI although does admit to sometimes thinking "it'd be easier if she wasn't here", denies a plan, states she would never to do this to her children       The following portions of the patient's history were reviewed and updated as appropriate: allergies, current medications, past medical history, past social history and problem list     Review of Systems   Constitutional: Positive for fatigue  Negative for chills, diaphoresis and fever  Respiratory: Negative for cough, shortness of breath and wheezing  Cardiovascular: Negative for chest pain, palpitations and leg swelling  Gastrointestinal:        Sees GI for microscopic colitis   Musculoskeletal: Negative for arthralgias  Neurological: Negative for dizziness and headaches  Psychiatric/Behavioral: Positive for dysphoric mood and sleep disturbance  Negative for self-injury and suicidal ideas  The patient is nervous/anxious  Objective:      /88 (BP Location: Left arm, Patient Position: Sitting, Cuff Size: Standard)   Pulse 64   Temp (!) 97 4 °F (36 3 °C) (Tympanic)   Resp 16   Wt 92 kg (202 lb 12 8 oz)   BMI 34 81 kg/m²          Physical Exam   Constitutional: She is oriented to person, place, and time  She appears well-developed and well-nourished  HENT:   Head: Normocephalic and atraumatic  Eyes: Pupils are equal, round, and reactive to light  Cardiovascular: Normal rate, regular rhythm and normal heart sounds  Pulmonary/Chest: Effort normal and breath sounds normal    Neurological: She is alert and oriented to person, place, and time  Skin: Skin is warm and dry  Psychiatric: Her speech is normal and behavior is normal  Judgment and thought content normal  Her mood appears anxious  Cognition and memory are normal  She exhibits a depressed mood     Crying throughout office visit

## 2018-03-23 ENCOUNTER — OFFICE VISIT (OUTPATIENT)
Dept: BEHAVIORAL/MENTAL HEALTH CLINIC | Facility: CLINIC | Age: 43
End: 2018-03-23
Payer: COMMERCIAL

## 2018-03-23 DIAGNOSIS — F32.2 SEVERE SINGLE CURRENT EPISODE OF MAJOR DEPRESSIVE DISORDER, WITHOUT PSYCHOTIC FEATURES (HCC): Primary | ICD-10-CM

## 2018-03-23 PROCEDURE — 90834 PSYTX W PT 45 MINUTES: CPT | Performed by: SOCIAL WORKER

## 2018-03-23 NOTE — PSYCH
Assessment/Plan:      There are no diagnoses linked to this encounter  Subjective:     Patient ID: Pipe Topete is a 38 y o  female  James Hastings continues to struggle with depression and anxiety that resulted in recent PCP visit and increase in her Paxil  Had noticed some improvement in mood and anxiety on lower dose that seemed to taper off  Has had no issues tolerating the medication  Continues to detail some mood lability, issues with lack of energy and motivation  Tends to isolate and has been pushing self to be more active and engaged  Presents as anxious  She is verbal, cooperative and well oriented  Metb with her for 45 minutes from 11:40AM-12:25PM         Review of Systems   Psychiatric/Behavioral: Positive for dysphoric mood  The patient is nervous/anxious  Objective:     Physical Exam   Psychiatric: Her speech is normal and behavior is normal  Judgment and thought content normal  Her mood appears anxious  Cognition and memory are normal  She exhibits a depressed mood  Has very good support system  No SI  Very invested in her family

## 2018-03-23 NOTE — PATIENT INSTRUCTIONS
Provided supportive therapy  She utilized session to ventilate regarding her struggles and stressors  Carries unresolved issues from her friend's unexpected p[assing  Will be focus in upcoming sessions  Reviewed some coping strategies for mood issues to employ  Will see her again in 6 days to assess

## 2018-03-29 ENCOUNTER — OFFICE VISIT (OUTPATIENT)
Dept: BEHAVIORAL/MENTAL HEALTH CLINIC | Facility: CLINIC | Age: 43
End: 2018-03-29
Payer: COMMERCIAL

## 2018-03-29 DIAGNOSIS — F41.9 ANXIETY: ICD-10-CM

## 2018-03-29 DIAGNOSIS — F32.2 SEVERE SINGLE CURRENT EPISODE OF MAJOR DEPRESSIVE DISORDER, WITHOUT PSYCHOTIC FEATURES (HCC): Primary | ICD-10-CM

## 2018-03-29 PROCEDURE — 90832 PSYTX W PT 30 MINUTES: CPT | Performed by: SOCIAL WORKER

## 2018-03-29 RX ORDER — ALPRAZOLAM 0.5 MG/1
0.5 TABLET ORAL
Qty: 30 TABLET | Refills: 0 | Status: SHIPPED | OUTPATIENT
Start: 2018-03-29 | End: 2018-04-02 | Stop reason: SDUPTHER

## 2018-03-29 NOTE — PSYCH
Assessment/Plan:      There are no diagnoses linked to this encounter  Subjective:     Patient ID: Jj Wilkes is a 38 y o  female  Bryson Luna reports progress on current Paxil dose  Has been feeling much less hopeless and irritable  Reports increase in energy and motivation  Has felt much less overwhelmed and has been more active with her family  She is pleasant, verbal and cooperative duriing session  Met with for 30 minutes from 12:00PM-12:30PM         Review of Systems   Psychiatric/Behavioral: Negative  Objective:     Physical Exam   Psychiatric: She has a normal mood and affect  Her speech is normal and behavior is normal  Judgment and thought content normal  Cognition and memory are normal    Denies any acute issues but reports symptoms have decreased in intensity and frequency  Has some depressive symptoms as well as anxiety but managing  Family has remarked on progress   No SI

## 2018-03-29 NOTE — PATIENT INSTRUCTIONS
Provided positive reinforcement for her efforts as she has been pushing self to be more active inside and outside of her home  Encouraged to increase utilization of social activities/outlets to further lessen symptoms and she agrees  Will contact me over next 2-3 weeks to report on her status  Offered sessions on a PRN basis moving forward

## 2018-04-02 DIAGNOSIS — F41.9 ANXIETY: ICD-10-CM

## 2018-04-02 RX ORDER — ALPRAZOLAM 0.5 MG/1
TABLET ORAL
Qty: 30 TABLET | Refills: 0 | Status: SHIPPED | OUTPATIENT
Start: 2018-04-02 | End: 2018-05-03 | Stop reason: SDUPTHER

## 2018-04-27 ENCOUNTER — APPOINTMENT (OUTPATIENT)
Dept: LAB | Facility: HOSPITAL | Age: 43
End: 2018-04-27
Attending: SPECIALIST
Payer: COMMERCIAL

## 2018-04-27 ENCOUNTER — TRANSCRIBE ORDERS (OUTPATIENT)
Dept: LAB | Facility: HOSPITAL | Age: 43
End: 2018-04-27

## 2018-04-27 DIAGNOSIS — Z79.899 NEED FOR PROPHYLACTIC CHEMOTHERAPY: ICD-10-CM

## 2018-04-27 DIAGNOSIS — Z79.899 ENCOUNTER FOR LONG-TERM (CURRENT) USE OF MEDICATIONS: ICD-10-CM

## 2018-04-27 DIAGNOSIS — Z79.899 ENCOUNTER FOR LONG-TERM (CURRENT) USE OF MEDICATIONS: Primary | ICD-10-CM

## 2018-04-27 LAB
ALBUMIN SERPL BCP-MCNC: 3.7 G/DL (ref 3.5–5)
ALP SERPL-CCNC: 66 U/L (ref 46–116)
ALT SERPL W P-5'-P-CCNC: 21 U/L (ref 12–78)
ANION GAP SERPL CALCULATED.3IONS-SCNC: 4 MMOL/L (ref 4–13)
AST SERPL W P-5'-P-CCNC: 16 U/L (ref 5–45)
BASOPHILS # BLD AUTO: 0.06 THOUSANDS/ΜL (ref 0–0.1)
BASOPHILS NFR BLD AUTO: 1 % (ref 0–1)
BILIRUB SERPL-MCNC: 0.36 MG/DL (ref 0.2–1)
BUN SERPL-MCNC: 13 MG/DL (ref 5–25)
CALCIUM SERPL-MCNC: 8.9 MG/DL (ref 8.3–10.1)
CHLORIDE SERPL-SCNC: 104 MMOL/L (ref 100–108)
CO2 SERPL-SCNC: 29 MMOL/L (ref 21–32)
CREAT SERPL-MCNC: 0.81 MG/DL (ref 0.6–1.3)
EOSINOPHIL # BLD AUTO: 0.25 THOUSAND/ΜL (ref 0–0.61)
EOSINOPHIL NFR BLD AUTO: 3 % (ref 0–6)
ERYTHROCYTE [DISTWIDTH] IN BLOOD BY AUTOMATED COUNT: 12.8 % (ref 11.6–15.1)
GFR SERPL CREATININE-BSD FRML MDRD: 89 ML/MIN/1.73SQ M
GLUCOSE P FAST SERPL-MCNC: 100 MG/DL (ref 65–99)
HCT VFR BLD AUTO: 40.3 % (ref 34.8–46.1)
HGB BLD-MCNC: 13.8 G/DL (ref 11.5–15.4)
LYMPHOCYTES # BLD AUTO: 2.87 THOUSANDS/ΜL (ref 0.6–4.47)
LYMPHOCYTES NFR BLD AUTO: 31 % (ref 14–44)
MCH RBC QN AUTO: 29.7 PG (ref 26.8–34.3)
MCHC RBC AUTO-ENTMCNC: 34.2 G/DL (ref 31.4–37.4)
MCV RBC AUTO: 87 FL (ref 82–98)
MONOCYTES # BLD AUTO: 0.79 THOUSAND/ΜL (ref 0.17–1.22)
MONOCYTES NFR BLD AUTO: 9 % (ref 4–12)
NEUTROPHILS # BLD AUTO: 5.21 THOUSANDS/ΜL (ref 1.85–7.62)
NEUTS SEG NFR BLD AUTO: 56 % (ref 43–75)
NRBC BLD AUTO-RTO: 0 /100 WBCS
PLATELET # BLD AUTO: 296 THOUSANDS/UL (ref 149–390)
PMV BLD AUTO: 10 FL (ref 8.9–12.7)
POTASSIUM SERPL-SCNC: 4.3 MMOL/L (ref 3.5–5.3)
PROT SERPL-MCNC: 7.1 G/DL (ref 6.4–8.2)
RBC # BLD AUTO: 4.65 MILLION/UL (ref 3.81–5.12)
SODIUM SERPL-SCNC: 137 MMOL/L (ref 136–145)
WBC # BLD AUTO: 9.21 THOUSAND/UL (ref 4.31–10.16)

## 2018-04-27 PROCEDURE — 86480 TB TEST CELL IMMUN MEASURE: CPT

## 2018-04-27 PROCEDURE — 80074 ACUTE HEPATITIS PANEL: CPT

## 2018-04-27 PROCEDURE — 85025 COMPLETE CBC W/AUTO DIFF WBC: CPT

## 2018-04-27 PROCEDURE — 36415 COLL VENOUS BLD VENIPUNCTURE: CPT

## 2018-04-27 PROCEDURE — 80053 COMPREHEN METABOLIC PANEL: CPT

## 2018-04-28 LAB
HAV IGM SER QL: NORMAL
HBV CORE IGM SER QL: NORMAL
HBV SURFACE AG SER QL: NORMAL
HCV AB SER QL: NORMAL

## 2018-04-29 LAB
ANNOTATION COMMENT IMP: NORMAL
GAMMA INTERFERON BACKGROUND BLD IA-ACNC: 0.01 IU/ML
M TB IFN-G BLD-IMP: NEGATIVE
M TB IFN-G CD4+ BCKGRND COR BLD-ACNC: 0.08 IU/ML
M TB IFN-G CD4+ T-CELLS BLD-ACNC: 0.09 IU/ML
MITOGEN IGNF BLD-ACNC: >10 IU/ML
QUANTIFERON-TB GOLD IN TUBE: NORMAL
SERVICE CMNT-IMP: NORMAL

## 2018-04-30 ENCOUNTER — OFFICE VISIT (OUTPATIENT)
Dept: BEHAVIORAL/MENTAL HEALTH CLINIC | Facility: CLINIC | Age: 43
End: 2018-04-30
Payer: COMMERCIAL

## 2018-04-30 DIAGNOSIS — F32.2 SEVERE SINGLE CURRENT EPISODE OF MAJOR DEPRESSIVE DISORDER, WITHOUT PSYCHOTIC FEATURES (HCC): Primary | ICD-10-CM

## 2018-04-30 PROCEDURE — 90791 PSYCH DIAGNOSTIC EVALUATION: CPT | Performed by: SOCIAL WORKER
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
## 2018-05-03 DIAGNOSIS — F41.9 ANXIETY: ICD-10-CM

## 2018-05-03 RX ORDER — ALPRAZOLAM 0.5 MG/1
TABLET ORAL
Qty: 30 TABLET | Refills: 0 | Status: SHIPPED | OUTPATIENT
Start: 2018-05-03 | End: 2018-06-04 | Stop reason: SDUPTHER

## 2018-05-15 DIAGNOSIS — F32.2 SEVERE SINGLE CURRENT EPISODE OF MAJOR DEPRESSIVE DISORDER, WITHOUT PSYCHOTIC FEATURES (HCC): ICD-10-CM

## 2018-05-15 RX ORDER — PAROXETINE HYDROCHLORIDE 25 MG/1
25 TABLET, FILM COATED, EXTENDED RELEASE ORAL EVERY MORNING
Qty: 90 TABLET | Refills: 1 | Status: SHIPPED | OUTPATIENT
Start: 2018-05-15 | End: 2018-07-30 | Stop reason: DRUGHIGH

## 2018-05-19 RX ORDER — TRIAMCINOLONE ACETONIDE 1 MG/G
CREAM TOPICAL
Refills: 3 | COMMUNITY
Start: 2018-04-18

## 2018-05-19 RX ORDER — ADALIMUMAB 40MG/0.8ML
40 KIT SUBCUTANEOUS
COMMUNITY
Start: 2018-04-30

## 2018-05-19 RX ORDER — CLOBETASOL PROPIONATE 0.46 MG/ML
SOLUTION TOPICAL
Refills: 2 | COMMUNITY
Start: 2018-04-18

## 2018-05-22 ENCOUNTER — OFFICE VISIT (OUTPATIENT)
Dept: FAMILY MEDICINE CLINIC | Facility: CLINIC | Age: 43
End: 2018-05-22
Payer: COMMERCIAL

## 2018-05-22 VITALS
WEIGHT: 203.2 LBS | SYSTOLIC BLOOD PRESSURE: 116 MMHG | HEART RATE: 50 BPM | HEIGHT: 64 IN | BODY MASS INDEX: 34.69 KG/M2 | OXYGEN SATURATION: 98 % | RESPIRATION RATE: 16 BRPM | DIASTOLIC BLOOD PRESSURE: 72 MMHG | TEMPERATURE: 97.6 F

## 2018-05-22 DIAGNOSIS — N20.0 RENAL CALCULUS, RIGHT: ICD-10-CM

## 2018-05-22 DIAGNOSIS — F32.2 SEVERE SINGLE CURRENT EPISODE OF MAJOR DEPRESSIVE DISORDER, WITHOUT PSYCHOTIC FEATURES (HCC): ICD-10-CM

## 2018-05-22 DIAGNOSIS — E78.2 MIXED HYPERLIPIDEMIA: ICD-10-CM

## 2018-05-22 DIAGNOSIS — E66.09 OBESITY DUE TO EXCESS CALORIES, UNSPECIFIED OBESITY SEVERITY: ICD-10-CM

## 2018-05-22 DIAGNOSIS — F41.9 ANXIETY: Primary | ICD-10-CM

## 2018-05-22 DIAGNOSIS — K52.839 MICROSCOPIC COLITIS, UNSPECIFIED MICROSCOPIC COLITIS TYPE: ICD-10-CM

## 2018-05-22 PROBLEM — F32.4 MAJOR DEPRESSIVE DISORDER WITH SINGLE EPISODE, IN PARTIAL REMISSION (HCC): Status: ACTIVE | Noted: 2018-03-22

## 2018-05-22 PROBLEM — L40.9 PSORIASIS: Status: ACTIVE | Noted: 2018-05-22

## 2018-05-22 PROCEDURE — 99214 OFFICE O/P EST MOD 30 MIN: CPT | Performed by: NURSE PRACTITIONER

## 2018-05-22 NOTE — PATIENT INSTRUCTIONS
Continue all current medications  Lipid panel and TSH labs ordered today- we'll call with these results   Follow up with Dermatology, GI and Ed Seen as scheduled   Return to our office in 6 months

## 2018-05-22 NOTE — ASSESSMENT & PLAN NOTE
PHQ 9 score of 11 today  She feels her depression is well controlled at this time  Continue Paxil CR 25 mg one tab daily   F/u with therapist as scheduled

## 2018-05-22 NOTE — ASSESSMENT & PLAN NOTE
Noted on CT scan from 4/2017, 4 mm nonobstructive  Advised to stay well hydrated, call the office with any flank pain, dysuria, hematuria, F/C, N/V

## 2018-05-22 NOTE — ASSESSMENT & PLAN NOTE
MARILY 7 score of 13 today   We did discuss increasing her Paxil but she declined this  She'll continue Xanax 0 5 mg 1/2 to 1 tab BID prn   Follow up with Tierney Reilly, therapist, as scheduled

## 2018-05-22 NOTE — ASSESSMENT & PLAN NOTE
Lipid panel from 3/2017 with / / TG 82/ HDL 59   Updated lipid panel ordered today  Follow a low fat/ low cholesterol diet, work on regular exercise and weight loss

## 2018-05-22 NOTE — PROGRESS NOTES
Chief Complaint   Patient presents with    Follow-up     Medication Review     Assessment/Plan:    Microscopic colitis  Stable  Managed by GI, Dr Burton Latin office    Psoriasis  Stable  Managed by Dr Luis Weller, Dermatology  Recently started on Humira     Renal calculus, right  Noted on CT scan from 4/2017, 4 mm nonobstructive  Advised to stay well hydrated, call the office with any flank pain, dysuria, hematuria, F/C, N/V    Anxiety  MARILY 7 score of 13 today   We did discuss increasing her Paxil but she declined this  She'll continue Xanax 0 5 mg 1/2 to 1 tab BID prn   Follow up with Camille Chan, therapist, as scheduled     Major depressive disorder with single episode, in partial remission ([facility] )  PHQ 9 score of 11 today  She feels her depression is well controlled at this time  Continue Paxil CR 25 mg one tab daily   F/u with therapist as scheduled     Mixed hyperlipidemia  Lipid panel from 3/2017 with / / TG 82/ HDL 59   Updated lipid panel ordered today  Follow a low fat/ low cholesterol diet, work on regular exercise and weight loss     Obesity due to excess calories, unspecified obesity severity  Advised regular exercise 30 minutes 5 x per week, work on weight loss     Have labs done now (lipid panel and TSH  Had CBC and CMP done recently with Derm)- we'll call with results   RTO 6 months      Diagnoses and all orders for this visit:    Anxiety  -     TSH, 3rd generation with T4 reflex; Future    Severe single current episode of major depressive disorder, without psychotic features ([place] [id number] )  -     TSH, 3rd generation with T4 reflex; Future    Mixed hyperlipidemia  -     Lipid panel; Future  -     TSH, 3rd generation with T4 reflex; Future    Renal calculus, right    Microscopic colitis, unspecified microscopic colitis type    Obesity due to excess calories, unspecified obesity severity          Subjective:      Patient ID: Santosh Breaux is a 37 y o  female      HPI    Pt presents by herself today for a routine follow up of depression and anxiety     She is currently taking Paxil CR 25 mg daily  Uses Xanax 0 5 mg BID- takes this daily, but takes 1/2 tab on some days  Feels her depression has improved, mood is better  Still struggling with anxiety, although this also improved  Doesn't like making decisions, feels she is just an anxious person   Anxiety is better controlled with the Xanax     She recently established care with Alfonso Lee, therapist, on 4/20/18 - will be seeing monthly for now   Her family is supportive   Sleeping and eating well   Denies SI/HI    Following with Dr Maia Braun, Dermatology for her Psoriasis   Recently started on Humira - feels she already sees an improvement in her skin   Recent labs done with Dr Maia Braun prior to initiating this medication     Microscopic colitis- following with  GI, Dr Zoë Dewitt   Taking Budesonide, feels her symptoms have been stable, no recent flare ups     CT of the abdomen done with GI on 4/7/18 showed a 4mm nonobstructive right lower pole calculus   She notes occasional right flank pain, nothing consistent   Denies dysuria, hematuria, fevers, chills, N/V    Hyperlipidemia- last lipid panel from 3/2017 with / / TG 82/ HDL 59       The following portions of the patient's history were reviewed and updated as appropriate: allergies, current medications, past medical history, past social history and problem list     Review of Systems   Constitutional: Negative for appetite change, chills, diaphoresis, fatigue and fever  Eyes: Negative for visual disturbance  Respiratory: Negative for cough, chest tightness, shortness of breath and wheezing  Cardiovascular: Negative for chest pain, palpitations and leg swelling  Gastrointestinal: Negative for abdominal pain, blood in stool, constipation, diarrhea and nausea  Genitourinary: Positive for flank pain (occasional right sided flank discomfort)  Negative for dysuria and hematuria  Musculoskeletal: Negative for arthralgias and myalgias  Skin:        Psoriasis    Neurological: Negative for dizziness and headaches  Hematological: Negative for adenopathy  Psychiatric/Behavioral: Negative for dysphoric mood, self-injury, sleep disturbance and suicidal ideas  The patient is nervous/anxious  Objective:      /72 (BP Location: Left arm, Patient Position: Sitting, Cuff Size: Adult)   Pulse (!) 50   Temp 97 6 °F (36 4 °C) (Tympanic)   Resp 16   Ht 5' 4" (1 626 m)   Wt 92 2 kg (203 lb 3 2 oz)   SpO2 98%   BMI 34 88 kg/m²          Physical Exam   Constitutional: She is oriented to person, place, and time  She appears well-developed and well-nourished  No distress  HENT:   Head: Normocephalic and atraumatic  Eyes: Conjunctivae are normal  Pupils are equal, round, and reactive to light  Neck: Normal range of motion  Neck supple  No thyromegaly present  Cardiovascular: Normal rate, regular rhythm and normal heart sounds  No murmur heard  No LE edema B/L  No carotid bruits    Pulmonary/Chest: Effort normal and breath sounds normal  No respiratory distress  She has no wheezes  Abdominal: Soft  Bowel sounds are normal  She exhibits no distension  There is no tenderness  Musculoskeletal: Normal range of motion  She exhibits no edema  Lymphadenopathy:     She has no cervical adenopathy  Neurological: She is alert and oriented to person, place, and time  Skin: Skin is warm and dry  She is not diaphoretic  Psychiatric: She has a normal mood and affect

## 2018-06-04 DIAGNOSIS — F41.9 ANXIETY: ICD-10-CM

## 2018-06-04 RX ORDER — ALPRAZOLAM 0.5 MG/1
TABLET ORAL
Qty: 30 TABLET | Refills: 0 | Status: SHIPPED | OUTPATIENT
Start: 2018-06-04 | End: 2018-07-05 | Stop reason: SDUPTHER

## 2018-06-08 ENCOUNTER — TELEPHONE (OUTPATIENT)
Dept: GASTROENTEROLOGY | Facility: AMBULARY SURGERY CENTER | Age: 43
End: 2018-06-08

## 2018-06-08 DIAGNOSIS — K52.839 MICROSCOPIC COLITIS, UNSPECIFIED MICROSCOPIC COLITIS TYPE: Primary | ICD-10-CM

## 2018-06-08 DIAGNOSIS — K52.839 MICROSCOPIC COLITIS, UNSPECIFIED MICROSCOPIC COLITIS TYPE: ICD-10-CM

## 2018-06-08 RX ORDER — BUDESONIDE 3 MG/1
9 CAPSULE, COATED PELLETS ORAL DAILY
Qty: 90 CAPSULE | Refills: 0 | Status: CANCELLED | OUTPATIENT
Start: 2018-06-08

## 2018-06-08 RX ORDER — BUDESONIDE 3 MG/1
6 CAPSULE, COATED PELLETS ORAL DAILY
Qty: 60 CAPSULE | Refills: 5 | Status: SHIPPED | OUTPATIENT
Start: 2018-06-08 | End: 2018-12-08 | Stop reason: SDUPTHER

## 2018-06-13 ENCOUNTER — OFFICE VISIT (OUTPATIENT)
Dept: BEHAVIORAL/MENTAL HEALTH CLINIC | Facility: CLINIC | Age: 43
End: 2018-06-13
Payer: COMMERCIAL

## 2018-06-13 DIAGNOSIS — F32.4 MAJOR DEPRESSIVE DISORDER WITH SINGLE EPISODE, IN PARTIAL REMISSION (HCC): ICD-10-CM

## 2018-06-13 DIAGNOSIS — F41.9 ANXIETY: Primary | ICD-10-CM

## 2018-06-13 PROCEDURE — 90834 PSYTX W PT 45 MINUTES: CPT | Performed by: SOCIAL WORKER

## 2018-06-14 NOTE — PSYCH
Treatment Plan Tracking    # 1Treatment Plan not completed within required time limits due to: Client presented with emotional/behavorial issues that required clinical intervention  Kallie Hitchcock

## 2018-06-14 NOTE — PSYCH
Psychotherapy Provided: Individual Psychotherapy 45 minutes     Length of time in session: 45 minutes, follow up in 2 week    Goals addressed in session: Goal 1     Pain:      none    0    Current suicide risk : Low     D- Lulú Villafana presented as upset and tearful  She stated that she has not been able to see her friends children because their father has kept them away  In addition she stated that she has been struggling with her in laws lack of consideration for her family and the fact that they focus on her sister in laws family  She also stated that she has continued to feel depressed over all and worries about what she will do with her life when her children leave for college  Processing her emotions and discussing ways for her to explore what she wants for her life and who she is as a person  Also discussing ways for her to cope with her family situation and the loss of her friend and her children  Giving supportive therapy  A- Progress - Continuing to process her emotions  P-Continue treatment    [address]: Diagnosis and Treatment Plan explained to Destiny Other relates understanding diagnosis and is agreeable to Treatment Plan   Yes

## 2018-07-05 DIAGNOSIS — F41.9 ANXIETY: ICD-10-CM

## 2018-07-05 RX ORDER — ALPRAZOLAM 0.5 MG/1
TABLET ORAL
Qty: 30 TABLET | Refills: 0 | Status: SHIPPED | OUTPATIENT
Start: 2018-07-05 | End: 2018-08-03 | Stop reason: SDUPTHER

## 2018-07-09 ENCOUNTER — OFFICE VISIT (OUTPATIENT)
Dept: BEHAVIORAL/MENTAL HEALTH CLINIC | Facility: CLINIC | Age: 43
End: 2018-07-09
Payer: COMMERCIAL

## 2018-07-09 DIAGNOSIS — F41.9 ANXIETY: Primary | ICD-10-CM

## 2018-07-09 PROCEDURE — 90834 PSYTX W PT 45 MINUTES: CPT | Performed by: SOCIAL WORKER

## 2018-07-09 NOTE — PSYCH
Chilango Means  1975       Date of Initial Treatment Plan: 7/9/18   Date of Current Treatment Plan: 07/09/18    Treatment Plan Number 1     Strengths/Personal Resources for Self Care: Good mother, caring    Diagnosis:   No diagnosis found  Area of Needs:   Depression  Loss of friend  Family      Long Term Goal 1: Feel better    Target Date: 10/9/18  Completion Date: TBD         Short Term Objectives for Goal 1:        Use healthy coping mechanisms       Talk about how I'm feeling       Take time for myself    Long Term Goal 2:  Be able to cope with the loss    Target Date: 10/9/18  Completion Date: TBD    Short Term Objectives for Goal 2:    Talk about how I'm feeling   Allow for my grief   Stay in touch with her children         Long Term Goal # 3: Not let them get to me      Target Date: 10/9/18  Completion Date: TBD    Short Term Objectives for Goal 3:    Set boundaries   Communicate effectively   Don't enter in drama   Use my support system    GOAL 1: Modality: Individual 1-2x per month   Completion Date TBD and The person(s) responsible for carrying out the plan is  Ronda    GOAL 2: Modality: Individual 1-2x per month   Completion Date TBD and The person(s) responsible for carrying out the plan is  Ronda     GOAL 3: Modality: Individual 1-2x per month   Completion Date TBD and The person(s) responsible for carrying out the plan is  [employer] [facility] [place]: Diagnosis and Treatment Plan explained to Talon Zarco relates understanding diagnosis and is agreeable to Treatment Plan         Client Comments : Please share your thoughts, feelings, need and/or experiences regarding your treatment plan: __________________________________________________________________    __________________________________________________________________    __________________________________________________________________    __________________________________________________________________    _______________________________________                Patient signature, Date Time: __________________________________________             Physician cosigner signature, Date, Time: ________________________________

## 2018-07-09 NOTE — PSYCH
Psychotherapy Provided: Individual Psychotherapy 45 minutes     Length of time in session: 45 minutes, follow up in 1 month    Goals addressed in session: Goal 1, Goal 2 and Goal 3      Pain:      none    0    Current suicide risk : Low     CAITLYN Bond stated that her parents have been visiting and that they leave tonight  She stated that she and her mother have not been getting along  She shared that her mother has been very irritable and irrational with her  Processing her emotions and discussing ways for her to set boundaries with her mother  Also discussing her family dynamic and ways to navigate family gatherings  Creating treatment goals  Giving supportive therapy  A- Progress - Continuing to process her emotions  P-Continue treatment    [address]: Diagnosis and Treatment Plan explained to Rowena Montenegro relates understanding diagnosis and is agreeable to Treatment Plan   Yes

## 2018-07-23 ENCOUNTER — TELEPHONE (OUTPATIENT)
Dept: FAMILY MEDICINE CLINIC | Facility: CLINIC | Age: 43
End: 2018-07-23

## 2018-07-23 ENCOUNTER — OFFICE VISIT (OUTPATIENT)
Dept: URGENT CARE | Age: 43
End: 2018-07-23
Payer: COMMERCIAL

## 2018-07-23 VITALS
BODY MASS INDEX: 34.62 KG/M2 | SYSTOLIC BLOOD PRESSURE: 128 MMHG | HEART RATE: 62 BPM | OXYGEN SATURATION: 98 % | WEIGHT: 202.8 LBS | RESPIRATION RATE: 18 BRPM | TEMPERATURE: 97.9 F | HEIGHT: 64 IN | DIASTOLIC BLOOD PRESSURE: 88 MMHG

## 2018-07-23 DIAGNOSIS — G62.9 PERIPHERAL POLYNEUROPATHY: Primary | ICD-10-CM

## 2018-07-23 PROCEDURE — G0382 LEV 3 HOSP TYPE B ED VISIT: HCPCS | Performed by: FAMILY MEDICINE

## 2018-07-23 NOTE — PROGRESS NOTES
[address] [facility] [facility]        NAME: Denise Bundy is a 37 y o  female  : 1975    MRN: [id number]  DATE: 2018  TIME: 3:35 PM    Assessment and Plan   Peripheral polyneuropathy [G62 9]  1  Peripheral polyneuropathy       Wells Score 0  Patient Instructions     Take B vitamins (talk to doctor before taking)  Ibuprofen for inflammation  Follow up with PCP in 1-3 days for blood work and r/o need for U/S/EMG  Proceed to  ER if symptoms worsen  Chief Complaint     Chief Complaint   Patient presents with    Numbness     Constant tingling from b/l knees to feet x 4 days  Weans in intensity but constant and notes occ  aching type pain on balls of feet  Skin also feels tight but no swelling  Feet occ  cold  No change in color and no leg weakness  No injury or back issues F         History of Present Illness       Patient with PMH anxiety presents with bilateral constant lower extremity tingling from the knee down to the plantar aspect of bilateral feet that waxes and wanes in severity but remains x 4 days  Denies worsening of symptoms with extended walking/running  Denies change in lower extremity color or weakness, fatigue, SOB, back pain, calf tenderness/redness  Deneis new medications  Denies recent tick bite  Denies recent surgery in past 4 weeks or immobilization over past 3 days  Denies hormone replacement therapy  Denies PMH: anemia, DM, hypothyroid, PVD, clotting disorder, DVT, PE  Review of Systems   Review of Systems   Constitutional: Negative for activity change, appetite change, chills and fever  Respiratory: Negative for cough and shortness of breath  Cardiovascular: Negative for chest pain and palpitations  Gastrointestinal: Negative for abdominal pain, anal bleeding, blood in stool, constipation, diarrhea and nausea  Musculoskeletal: Negative for arthralgias, back pain, joint swelling and myalgias  Skin: Negative for color change, rash and wound     Neurological: Positive for numbness  Negative for weakness, light-headedness and headaches  Psychiatric/Behavioral: The patient is not nervous/anxious            Current Medications       Current Outpatient Prescriptions:     ALPRAZolam (XANAX) 0 5 mg tablet, Take 1/2 tab BID prn for anxiety, Disp: 30 tablet, Rfl: 0    budesonide (ENTOCORT EC) 3 MG capsule, Take 2 capsules (6 mg total) by mouth daily, Disp: 60 capsule, Rfl: 5    clobetasol (TEMOVATE) 0 05 % external solution, APPLY TO SCALP TWICE DAILY, Disp: , Rfl: 2    HUMIRA PEN-PSORIASIS STARTER 40 MG/0 8ML PNKT, 40 mg every 14 (fourteen) days  , Disp: , Rfl:     PARoxetine (PAXIL-CR) 25 mg 24 hr tablet, Take 1 tablet (25 mg total) by mouth every morning for 90 days, Disp: 90 tablet, Rfl: 1    Probiotic Product (PROBIOTIC-10) CAPS, Take 2 capsules by mouth daily, Disp: , Rfl:     triamcinolone (KENALOG) 0 1 % cream, APPLY TWICE A DAY, Disp: , Rfl: 3    Current Allergies     Allergies as of 07/23/2018    (No Known Allergies)            The following portions of the patient's history were reviewed and updated as appropriate: allergies, current medications, past family history, past medical history, past social history, past surgical history and problem list      Past Medical History:   Diagnosis Date    Anxiety     Chronic kidney disease     R kidney stone    Colitis     Last Assessed: 5/18/2017    Depression     Diarrhea     Elevated ALT measurement     Last Assessed: 4/12/2017    Infectious mononucleosis     Irritable bowel syndrome with diarrhea     Last Assessed: 3/15/2017    Liver lesion     Last Assessed: 4/3/2017    Migraine     not for 3 years    Mucus in stool     Last Assessed: 3/15/2017    Obesity     Psoriasis     Varicella        Past Surgical History:   Procedure Laterality Date    DENTAL SURGERY      tooth extraction    NO PAST SURGERIES      VA COLONOSCOPY FLX DX W/COLLJ SPEC WHEN PFRMD N/A 5/18/2017    Procedure: EGD with bx AND COLONOSCOPY with bx;  Surgeon: Shawanda Cruz MD;  Location: [facility]; Service: Gastroenterology       Family History   Problem Relation Age of Onset    Hypertension Mother     Hypothyroidism Mother     Hypertension Father     Stroke Paternal Grandmother         Syndrome    Cancer Paternal Grandfather          Medications have been verified  Objective   /88 (BP Location: Right arm, Patient Position: Sitting, Cuff Size: Standard)   Pulse 62   Temp 97 9 °F (36 6 °C) (Oral)   Resp 18   Ht 5' 4" (1 626 m)   Wt 92 kg (202 lb 12 8 oz)   LMP 07/10/2018 (Exact Date)   SpO2 98%   BMI 34 81 kg/m²        Physical Exam     Physical Exam   Constitutional: She is oriented to person, place, and time  She appears well-developed and well-nourished  No distress  HENT:   Head: Normocephalic  Right Ear: External ear normal    Left Ear: External ear normal    Mouth/Throat: Oropharynx is clear and moist    Neck: No thyromegaly present  Cardiovascular: Normal rate, regular rhythm, normal heart sounds and intact distal pulses  Exam reveals no gallop and no friction rub  No murmur heard  Tibialis posterior and dorsalis pedis pulses 2+ bilaterally  Pulmonary/Chest: Effort normal and breath sounds normal  No respiratory distress  She has no wheezes  She has no rales  She exhibits no tenderness  Abdominal: Soft  There is no tenderness  Musculoskeletal: Normal range of motion  She exhibits no edema, tenderness or deformity  MS LE 5/5 bilaterally  Buerger's test without abnormalities  Lymphadenopathy:     She has no cervical adenopathy  Neurological: She is alert and oriented to person, place, and time  She has normal reflexes  She displays normal reflexes  She exhibits normal muscle tone  Coordination normal    Light touch sensation LE intact bilaterally  Skin: Skin is warm  No rash noted  No erythema  No pallor  Bilateral LE warm to touch without pallor/rubor/hyperpigmentation   Bilateral calves without erythema or edema  Psoriasis skin rash noted over L leg  Psychiatric: She has a normal mood and affect  Her behavior is normal  Judgment and thought content normal    Vitals reviewed

## 2018-07-23 NOTE — TELEPHONE ENCOUNTER
Patient phoned with tingling on/off in both legs from the knees to feet for last 4 days-patient was advised to go to the Urgent Care due to no available appointments today-patient stated that she would go to the Urgent Care

## 2018-07-23 NOTE — PATIENT INSTRUCTIONS
Take B vitamins (talk to doctor before taking)  Ibuprofen for inflammation  Follow up with PCP in 1-3 days for blood work and r/o need for U/S/EMG  Proceed to  ER if symptoms worsen

## 2018-07-30 ENCOUNTER — OFFICE VISIT (OUTPATIENT)
Dept: FAMILY MEDICINE CLINIC | Facility: CLINIC | Age: 43
End: 2018-07-30
Payer: COMMERCIAL

## 2018-07-30 VITALS
TEMPERATURE: 99.4 F | BODY MASS INDEX: 34.11 KG/M2 | WEIGHT: 199.8 LBS | HEIGHT: 64 IN | SYSTOLIC BLOOD PRESSURE: 132 MMHG | HEART RATE: 84 BPM | DIASTOLIC BLOOD PRESSURE: 84 MMHG | RESPIRATION RATE: 16 BRPM

## 2018-07-30 DIAGNOSIS — G62.9 PERIPHERAL POLYNEUROPATHY: Primary | ICD-10-CM

## 2018-07-30 DIAGNOSIS — M25.50 ARTHRALGIA OF MULTIPLE JOINTS: ICD-10-CM

## 2018-07-30 DIAGNOSIS — F32.4 MAJOR DEPRESSIVE DISORDER WITH SINGLE EPISODE, IN PARTIAL REMISSION (HCC): ICD-10-CM

## 2018-07-30 PROCEDURE — 3008F BODY MASS INDEX DOCD: CPT | Performed by: NURSE PRACTITIONER

## 2018-07-30 PROCEDURE — 99214 OFFICE O/P EST MOD 30 MIN: CPT | Performed by: NURSE PRACTITIONER

## 2018-07-30 RX ORDER — PAROXETINE HYDROCHLORIDE 37.5 MG/1
37.5 TABLET, FILM COATED, EXTENDED RELEASE ORAL EVERY MORNING
Qty: 90 TABLET | Refills: 1 | Status: SHIPPED | OUTPATIENT
Start: 2018-07-30 | End: 2019-01-10 | Stop reason: SDUPTHER

## 2018-07-30 NOTE — PROGRESS NOTES
Chief Complaint   Patient presents with    Numbness     pain in knees that comes and goes, tingling down legs balls of her feet get sore       Assessment/Plan:    [facility] with single [facility]  Will increase Paxil to 37 5 CR once daily  F/u with therapist, Mathew Kelley, every other week  Call our office with an update in 2-3 weeks       Peripheral polyneuropathy  Labs as ordered (including CMP, CBC, TSH, Sed rate, CRP, Lyme antibodies, Vitamin B12, folate)  May start Vitamin B complex OTC daily   If labs are unremarkable, consider EMG  We did discuss her seeing Rheum for her psoriasis but she would like to hold off for right now        Diagnoses and all orders for this visit:    Peripheral polyneuropathy  -     CBC and differential; Future  -     Comprehensive metabolic panel; Future  -     TSH, 3rd generation with Free T4 reflex; Future  -     Folate; Future  -     Vitamin B12; Future  -     Lyme Antibody Profile with reflex to WB; Future  -     Sedimentation rate, automated; Future  -     C-reactive protein; Future  -     RF Screen w/ Reflex to Titer; Future    Major depressive disorder with single episode, in partial remission ([facility])  -     PARoxetine (PAXIL-CR) 37 5 mg 24 hr tablet; Take 1 tablet (37 5 mg total) by mouth every morning    Arthralgia of multiple joints  -     Lyme Antibody Profile with reflex to WB; Future  -     Sedimentation rate, automated; Future  -     C-reactive protein; Future  -     RF Screen w/ Reflex to Titer; Future          Subjective:      Patient ID: Nikunj Black is a 37 y o  female      HPI     Patient presents by herself today for an acute visit   C/o tingling in B/L LEs which starts just below her knees (anterior and posterior) and extends down to her plantar aspect of her feet   Has had tinling in B/L LEs for months  Was more intermittent- would have for a day and then not again for a few months   More consistent over the past few weeks   Has tingling daily but not constant   Tingling gets more faint towards her feet   No numbness   Tingling is aggravated by touching or bumping her lower legs   No alleviating factors   Denies change in skin color or temperature to LEs   Denies cramping sensation with walking  No calf pain, swelling, redness     Notes occasional pain in B/L knees and balls of feet    She follows with Dermatology, Dr Calixto Ferrari for her psoriasis   Currently on Humira   She questions if she has psoriatic arthritis - she did discuss this with her dermatologist but he advised her to f/u with Rheumatology and she isn't sure if she wants to do this yet     She was [facility] at [facility] on [facility]18 for peripheral polyneuropathy   Was advised to start a Vitamin B supplement and f/u with our office   She has not started the Vitamin B yet    Pt also inquiring about a higher dose of Paxil   She has been following with [facility], sees therapist, Jacoby Mccain, every 2 weeks for depression and anxiety   Her therapist recommended increasing her Paxil   Feels she is doing well with therapy but mood could still be better   Still feels lack of motivation, irritable   Tolerates the Paxil CR 25 mg well, has a decreased libido but reports this is manageable   Denies SI/HI    The following portions of the patient's history were reviewed and updated as appropriate: allergies, current medications, past medical history, past social history and problem list     Review of Systems   Constitutional: Positive for fatigue  Negative for activity change, appetite change, chills, diaphoresis, fever and unexpected weight change  Eyes: Negative for visual disturbance  Respiratory: Negative for cough, chest tightness, shortness of breath and wheezing  Cardiovascular: Negative for chest pain, palpitations and leg swelling  Gastrointestinal: Negative for abdominal pain  Musculoskeletal: Positive for arthralgias (knees, ankles, feet, hands)     Skin:        Psoriasis Neurological: Negative for dizziness, tremors, seizures, syncope, speech difficulty, weakness, light-headedness, numbness and headaches  Tingling B/L LEs   Hematological: Negative for adenopathy  Does not bruise/bleed easily  Psychiatric/Behavioral: Positive for dysphoric mood  Negative for self-injury and suicidal ideas  The patient is nervous/anxious  Objective:      /84   Pulse 84   Temp 99 4 °F (37 4 °C) (Tympanic)   Resp 16   Ht 5' 4" (1 626 m)   Wt 90 6 kg (199 lb 12 8 oz)   LMP 07/10/2018 (Exact Date)   BMI 34 30 kg/m²          Physical Exam   Constitutional: She is oriented to person, place, and time  She appears well-developed and well-nourished  No distress  HENT:   Head: Normocephalic and atraumatic  Eyes: Conjunctivae are normal  Pupils are equal, round, and reactive to light  Neck: Normal range of motion  Neck supple  Cardiovascular: Normal rate, regular rhythm and normal heart sounds  No murmur heard  No LE edema B/L  No carotid bruits    Pulmonary/Chest: Effort normal and breath sounds normal  No respiratory distress  She has no wheezes  Musculoskeletal: Normal range of motion  B/L LE strength 5/5  No changes to skin color (other than psoriasis)  Skin is warm and dry to the touch  Pedal pulse +2 B/L  Reported tingling sensation to light touch of B/L LE starting posterior to knees and extending to plantar surface of B/L feet    Neurological: She is alert and oriented to person, place, and time  Skin: She is not diaphoretic  Psoriasis to B/L LEs, L >R   Psychiatric: She has a normal mood and affect

## 2018-07-30 NOTE — ASSESSMENT & PLAN NOTE
Labs as ordered (including CMP, CBC, TSH, Sed rate, CRP, Lyme antibodies, Vitamin B12, folate)  May start Vitamin B complex OTC daily   If labs are unremarkable, consider EMG  We did discuss her seeing Rheum for her psoriasis but she would like to hold off for right now

## 2018-07-30 NOTE — ASSESSMENT & PLAN NOTE
Will increase Paxil to 37 5 CR once daily  F/u with therapist, Laura Coughlin, every other week  Call our office with an update in 2-3 weeks

## 2018-07-31 ENCOUNTER — APPOINTMENT (OUTPATIENT)
Dept: LAB | Facility: HOSPITAL | Age: 43
End: 2018-07-31
Payer: COMMERCIAL

## 2018-07-31 DIAGNOSIS — G62.9 PERIPHERAL POLYNEUROPATHY: ICD-10-CM

## 2018-07-31 DIAGNOSIS — F41.9 ANXIETY: ICD-10-CM

## 2018-07-31 DIAGNOSIS — F32.2 SEVERE SINGLE CURRENT EPISODE OF MAJOR DEPRESSIVE DISORDER, WITHOUT PSYCHOTIC FEATURES (HCC): ICD-10-CM

## 2018-07-31 DIAGNOSIS — M25.50 ARTHRALGIA OF MULTIPLE JOINTS: ICD-10-CM

## 2018-07-31 DIAGNOSIS — E78.2 MIXED HYPERLIPIDEMIA: ICD-10-CM

## 2018-07-31 LAB
ALBUMIN SERPL BCP-MCNC: 4.1 G/DL (ref 3.5–5)
ALP SERPL-CCNC: 60 U/L (ref 46–116)
ALT SERPL W P-5'-P-CCNC: 36 U/L (ref 12–78)
ANION GAP SERPL CALCULATED.3IONS-SCNC: 6 MMOL/L (ref 4–13)
AST SERPL W P-5'-P-CCNC: 19 U/L (ref 5–45)
BASOPHILS # BLD AUTO: 0.09 THOUSANDS/ΜL (ref 0–0.1)
BASOPHILS NFR BLD AUTO: 1 % (ref 0–1)
BILIRUB SERPL-MCNC: 0.79 MG/DL (ref 0.2–1)
BUN SERPL-MCNC: 10 MG/DL (ref 5–25)
CALCIUM SERPL-MCNC: 9.4 MG/DL (ref 8.3–10.1)
CHLORIDE SERPL-SCNC: 103 MMOL/L (ref 100–108)
CHOLEST SERPL-MCNC: 247 MG/DL (ref 50–200)
CO2 SERPL-SCNC: 26 MMOL/L (ref 21–32)
CREAT SERPL-MCNC: 0.8 MG/DL (ref 0.6–1.3)
CRP SERPL QL: <3 MG/L
EOSINOPHIL # BLD AUTO: 0.19 THOUSAND/ΜL (ref 0–0.61)
EOSINOPHIL NFR BLD AUTO: 2 % (ref 0–6)
ERYTHROCYTE [DISTWIDTH] IN BLOOD BY AUTOMATED COUNT: 12.6 % (ref 11.6–15.1)
ERYTHROCYTE [SEDIMENTATION RATE] IN BLOOD: 13 MM/HOUR (ref 0–20)
FOLATE SERPL-MCNC: 17.1 NG/ML (ref 3.1–17.5)
GFR SERPL CREATININE-BSD FRML MDRD: 89 ML/MIN/1.73SQ M
GLUCOSE P FAST SERPL-MCNC: 93 MG/DL (ref 65–99)
HCT VFR BLD AUTO: 45.2 % (ref 34.8–46.1)
HDLC SERPL-MCNC: 49 MG/DL (ref 40–60)
HGB BLD-MCNC: 14.8 G/DL (ref 11.5–15.4)
IMM GRANULOCYTES # BLD AUTO: 0.06 THOUSAND/UL (ref 0–0.2)
IMM GRANULOCYTES NFR BLD AUTO: 1 % (ref 0–2)
LDLC SERPL CALC-MCNC: 171 MG/DL (ref 0–100)
LYMPHOCYTES # BLD AUTO: 2.53 THOUSANDS/ΜL (ref 0.6–4.47)
LYMPHOCYTES NFR BLD AUTO: 24 % (ref 14–44)
MCH RBC QN AUTO: 29.2 PG (ref 26.8–34.3)
MCHC RBC AUTO-ENTMCNC: 32.7 G/DL (ref 31.4–37.4)
MCV RBC AUTO: 89 FL (ref 82–98)
MONOCYTES # BLD AUTO: 0.79 THOUSAND/ΜL (ref 0.17–1.22)
MONOCYTES NFR BLD AUTO: 8 % (ref 4–12)
NEUTROPHILS # BLD AUTO: 6.87 THOUSANDS/ΜL (ref 1.85–7.62)
NEUTS SEG NFR BLD AUTO: 64 % (ref 43–75)
NONHDLC SERPL-MCNC: 198 MG/DL
NRBC BLD AUTO-RTO: 0 /100 WBCS
PLATELET # BLD AUTO: 307 THOUSANDS/UL (ref 149–390)
PMV BLD AUTO: 9.9 FL (ref 8.9–12.7)
POTASSIUM SERPL-SCNC: 4.7 MMOL/L (ref 3.5–5.3)
PROT SERPL-MCNC: 8 G/DL (ref 6.4–8.2)
RBC # BLD AUTO: 5.06 MILLION/UL (ref 3.81–5.12)
RHEUMATOID FACT SER QL LA: NEGATIVE
SODIUM SERPL-SCNC: 135 MMOL/L (ref 136–145)
TRIGL SERPL-MCNC: 133 MG/DL
TSH SERPL DL<=0.05 MIU/L-ACNC: 0.93 UIU/ML (ref 0.36–3.74)
VIT B12 SERPL-MCNC: 225 PG/ML (ref 100–900)
WBC # BLD AUTO: 10.53 THOUSAND/UL (ref 4.31–10.16)

## 2018-07-31 PROCEDURE — 86430 RHEUMATOID FACTOR TEST QUAL: CPT

## 2018-07-31 PROCEDURE — 80053 COMPREHEN METABOLIC PANEL: CPT

## 2018-07-31 PROCEDURE — 86140 C-REACTIVE PROTEIN: CPT

## 2018-07-31 PROCEDURE — 86618 LYME DISEASE ANTIBODY: CPT

## 2018-07-31 PROCEDURE — 84443 ASSAY THYROID STIM HORMONE: CPT

## 2018-07-31 PROCEDURE — 82746 ASSAY OF FOLIC ACID SERUM: CPT

## 2018-07-31 PROCEDURE — 85025 COMPLETE CBC W/AUTO DIFF WBC: CPT

## 2018-07-31 PROCEDURE — 85652 RBC SED RATE AUTOMATED: CPT

## 2018-07-31 PROCEDURE — 82607 VITAMIN B-12: CPT

## 2018-07-31 PROCEDURE — 80061 LIPID PANEL: CPT

## 2018-07-31 PROCEDURE — 36415 COLL VENOUS BLD VENIPUNCTURE: CPT

## 2018-08-01 LAB
B BURGDOR IGG SER IA-ACNC: 0.24
B BURGDOR IGM SER IA-ACNC: 0.34

## 2018-08-03 DIAGNOSIS — F41.9 ANXIETY: ICD-10-CM

## 2018-08-03 RX ORDER — ALPRAZOLAM 0.5 MG/1
TABLET ORAL
Qty: 30 TABLET | Refills: 0 | Status: SHIPPED | OUTPATIENT
Start: 2018-08-03 | End: 2018-09-10 | Stop reason: SDUPTHER

## 2018-08-17 ENCOUNTER — OFFICE VISIT (OUTPATIENT)
Dept: BEHAVIORAL/MENTAL HEALTH CLINIC | Facility: CLINIC | Age: 43
End: 2018-08-17
Payer: COMMERCIAL

## 2018-08-17 DIAGNOSIS — F41.9 ANXIETY: Primary | ICD-10-CM

## 2018-08-17 PROCEDURE — 90834 PSYTX W PT 45 MINUTES: CPT | Performed by: SOCIAL WORKER

## 2018-08-17 NOTE — PSYCH
Psychotherapy Provided: Individual Psychotherapy 45 minutes     Length of time in session: 45 minutes, follow up in 2 week    Goals addressed in session: Goal 1, Goal 2 and Goal 3      Pain:      none    0    Current suicide risk : Low     D- Evi Dickinson stated that she has been getting her children ready for school  She stated that they have also been making college visits with her older daughter  She stated that she has been thinking about what she wants the next phase of her life to be  Discussing employment and the fields that she is interested in  Also discussing ways to cope with her children transitioning to the next phases of their lives  Reviewing and signing her treatment plan  Giving supportive therapy  A- Progress - Continuing to process her emotions  P-Continue treatment    [address]: Diagnosis and Treatment Plan explained to Abdiaziz Herrera relates understanding diagnosis and is agreeable to Treatment Plan   Yes

## 2018-09-10 DIAGNOSIS — F41.9 ANXIETY: ICD-10-CM

## 2018-09-10 NOTE — TELEPHONE ENCOUNTER
Patient is requesting a refill of ALPRAZolam (XANAX) 0 5 mg, take 1/2 tablet 2x a day as needed - 30-day refill to [facility], [address]  Patient can be contacted at [phone] with any questions

## 2018-09-11 RX ORDER — ALPRAZOLAM 0.5 MG/1
TABLET ORAL
Qty: 30 TABLET | Refills: 0 | Status: SHIPPED | OUTPATIENT
Start: 2018-09-11 | End: 2018-10-10 | Stop reason: SDUPTHER

## 2018-09-24 ENCOUNTER — OFFICE VISIT (OUTPATIENT)
Dept: BEHAVIORAL/MENTAL HEALTH CLINIC | Facility: CLINIC | Age: 43
End: 2018-09-24
Payer: COMMERCIAL

## 2018-09-24 DIAGNOSIS — F41.9 ANXIETY: Primary | ICD-10-CM

## 2018-09-24 DIAGNOSIS — F32.4 MAJOR DEPRESSIVE DISORDER WITH SINGLE EPISODE, IN PARTIAL REMISSION (HCC): ICD-10-CM

## 2018-09-24 PROCEDURE — 90834 PSYTX W PT 45 MINUTES: CPT | Performed by: SOCIAL WORKER
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
## 2018-10-10 DIAGNOSIS — F41.9 ANXIETY: ICD-10-CM

## 2018-10-10 RX ORDER — ALPRAZOLAM 0.5 MG/1
TABLET ORAL
Qty: 30 TABLET | Refills: 0 | Status: SHIPPED | OUTPATIENT
Start: 2018-10-10 | End: 2018-11-08 | Stop reason: SDUPTHER

## 2018-10-11 ENCOUNTER — OFFICE VISIT (OUTPATIENT)
Dept: BEHAVIORAL/MENTAL HEALTH CLINIC | Facility: CLINIC | Age: 43
End: 2018-10-11
Payer: COMMERCIAL

## 2018-10-11 DIAGNOSIS — F32.4 MAJOR DEPRESSIVE DISORDER WITH SINGLE EPISODE, IN PARTIAL REMISSION (HCC): ICD-10-CM

## 2018-10-11 DIAGNOSIS — F41.9 ANXIETY: Primary | ICD-10-CM

## 2018-10-11 PROCEDURE — 90834 PSYTX W PT 45 MINUTES: CPT | Performed by: SOCIAL WORKER
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
## 2018-10-23 ENCOUNTER — OFFICE VISIT (OUTPATIENT)
Dept: FAMILY MEDICINE CLINIC | Facility: CLINIC | Age: 43
End: 2018-10-23
Payer: COMMERCIAL

## 2018-10-23 VITALS
TEMPERATURE: 98.7 F | BODY MASS INDEX: 33.87 KG/M2 | DIASTOLIC BLOOD PRESSURE: 78 MMHG | RESPIRATION RATE: 16 BRPM | HEART RATE: 60 BPM | SYSTOLIC BLOOD PRESSURE: 122 MMHG | WEIGHT: 198.4 LBS | HEIGHT: 64 IN

## 2018-10-23 DIAGNOSIS — F41.9 ANXIETY: ICD-10-CM

## 2018-10-23 DIAGNOSIS — M54.42 ACUTE LEFT-SIDED LOW BACK PAIN WITH LEFT-SIDED SCIATICA: Primary | ICD-10-CM

## 2018-10-23 PROCEDURE — 99214 OFFICE O/P EST MOD 30 MIN: CPT | Performed by: NURSE PRACTITIONER

## 2018-10-23 RX ORDER — PREDNISONE 10 MG/1
TABLET ORAL
Qty: 20 TABLET | Refills: 0 | Status: SHIPPED | OUTPATIENT
Start: 2018-10-23 | End: 2018-11-20 | Stop reason: SDUPTHER

## 2018-10-23 RX ORDER — CYCLOBENZAPRINE HCL 10 MG
10 TABLET ORAL
Qty: 20 TABLET | Refills: 0 | Status: SHIPPED | OUTPATIENT
Start: 2018-10-23 | End: 2019-04-03 | Stop reason: ALTCHOICE

## 2018-10-23 NOTE — PROGRESS NOTES
Chief Complaint   Patient presents with    Follow-up     anxiety meds, patient also states that she is having pain radiate down her left leg into her foot     Assessment/Plan:    1  Left lower back pain with left sided sciatica- to start PO Prednisone taper  SE reviewed, take with food  No NSAIDs while on this  May take OTC Tylenol prn, max of 3g/24 hours  May take Flexeril 10 mg one tab at HS prn  Moist heat to lower back/buttock  I did review some gentle stretches of her sciatica today  Avoid sitting on hard surfaces  Call office if no improvement    2  Anxiety- well controlled  Continue Parxoetine CR 37 5 mg one tab daily  Has Xanax 0 5 mg for prn use which she is using more sparingly  Continue to follow with therapist, Yomaira Johnson Q2 weeks      Diagnoses and all orders for this visit:    Acute left-sided low back pain with left-sided sciatica  -     predniSONE 10 mg tablet; Take 4 tabs for 2 days, 3 tabs for 2 days, 2 tabs for 2 days and 1 tab for 2 days  -     cyclobenzaprine (FLEXERIL) 10 mg tablet; Take 1 tablet (10 mg total) by mouth daily at bedtime as needed for muscle spasms    Anxiety          Subjective:      Patient ID: Kelsey Bowden is a 37 y o  female      HPI   C/o left lower back and buttock pain which is radiating into her left leg (down her left lateral thigh and knee, all the way down to the top of her left foot)  No injuries or falls   Always achy but ranges in intensity   Worse with sitting in her car   She has been walking much more recently, pain is worse with this   No Numbness or tingling down her leg   No leg weakness   Denies bowel or bladder incontinence   Heat does help   Pain is better with standing     Of note, pt is on Budesonide 3 mg, 2 tabs daily for 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    Pt currently taking Paroxetine CR 37 5 mg one tab daily for anxiety   She does have Xanax 0 5 mg for prn use and has been using this much less lately   Following every 2 weeks with therapist, Jean-Claude Munoz  Reports her mood is stable, feels well   Denies SI/HI    The following portions of the patient's history were reviewed and updated as appropriate: allergies, current medications, past medical history, past social history and problem list     Review of Systems   Constitutional: Negative for activity change, appetite change, chills, diaphoresis, fatigue, fever and unexpected weight change  Eyes: Negative for visual disturbance  Respiratory: Negative for cough, chest tightness, shortness of breath and wheezing  Cardiovascular: Negative for chest pain, palpitations and leg swelling  Gastrointestinal: Negative for abdominal pain, constipation, diarrhea and nausea  Musculoskeletal: Positive for back pain  Negative for gait problem  Skin: Negative for color change, pallor, rash and wound  Neurological: Negative for dizziness, weakness, light-headedness, numbness and headaches  Hematological: Negative for adenopathy  Does not bruise/bleed easily  Psychiatric/Behavioral:        As noted in HPI         Objective:      /78   Pulse 60   Temp 98 7 °F (37 1 °C) (Tympanic)   Resp 16   Ht 5' 4" (1 626 m)   Wt 90 kg (198 lb 6 4 oz)   LMP 10/09/2018   BMI 34 06 kg/m²          Physical Exam   Constitutional: She is oriented to person, place, and time  She appears well-developed and well-nourished  No distress  HENT:   Head: Normocephalic and atraumatic  Eyes: Pupils are equal, round, and reactive to light  Conjunctivae are normal    Cardiovascular: Normal rate, regular rhythm and normal heart sounds  No murmur heard  Pulmonary/Chest: Effort normal and breath sounds normal  No respiratory distress  She has no wheezes  Musculoskeletal: Normal range of motion     Left lower lumbar region is tender to palpation, tenderness extends into left buttock  Negative straight leg raise test  Strength 5/5 LEs  Normal sensation to light touch    Neurological: She is alert and oriented to person, place, and time  Skin: Skin is warm and dry  No rash noted  She is not diaphoretic  No erythema  Psychiatric: She has a normal mood and affect

## 2018-11-08 DIAGNOSIS — F41.9 ANXIETY: ICD-10-CM

## 2018-11-08 RX ORDER — ALPRAZOLAM 0.5 MG/1
TABLET ORAL
Qty: 30 TABLET | Refills: 0 | Status: SHIPPED | OUTPATIENT
Start: 2018-11-08 | End: 2018-12-09 | Stop reason: SDUPTHER

## 2018-11-16 ENCOUNTER — DOCUMENTATION (OUTPATIENT)
Dept: BEHAVIORAL/MENTAL HEALTH CLINIC | Facility: CLINIC | Age: 43
End: 2018-11-16
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
## 2018-11-20 ENCOUNTER — OFFICE VISIT (OUTPATIENT)
Dept: FAMILY MEDICINE CLINIC | Facility: CLINIC | Age: 43
End: 2018-11-20
Payer: COMMERCIAL

## 2018-11-20 VITALS
TEMPERATURE: 98.2 F | SYSTOLIC BLOOD PRESSURE: 124 MMHG | DIASTOLIC BLOOD PRESSURE: 84 MMHG | BODY MASS INDEX: 31.58 KG/M2 | WEIGHT: 185 LBS | HEART RATE: 84 BPM | HEIGHT: 64 IN | RESPIRATION RATE: 16 BRPM

## 2018-11-20 DIAGNOSIS — M54.42 ACUTE LEFT-SIDED LOW BACK PAIN WITH LEFT-SIDED SCIATICA: ICD-10-CM

## 2018-11-20 PROCEDURE — 3008F BODY MASS INDEX DOCD: CPT | Performed by: NURSE PRACTITIONER

## 2018-11-20 PROCEDURE — 1036F TOBACCO NON-USER: CPT | Performed by: NURSE PRACTITIONER

## 2018-11-20 PROCEDURE — 99214 OFFICE O/P EST MOD 30 MIN: CPT | Performed by: NURSE PRACTITIONER

## 2018-11-20 RX ORDER — PREDNISONE 10 MG/1
TABLET ORAL
Qty: 20 TABLET | Refills: 0 | Status: SHIPPED | OUTPATIENT
Start: 2018-11-20 | End: 2019-04-03 | Stop reason: ALTCHOICE

## 2018-11-20 RX ORDER — TRAMADOL HYDROCHLORIDE 50 MG/1
50 TABLET ORAL 2 TIMES DAILY PRN
Qty: 30 TABLET | Refills: 0 | Status: SHIPPED | OUTPATIENT
Start: 2018-11-20 | End: 2019-04-03 | Stop reason: ALTCHOICE

## 2018-11-20 NOTE — ASSESSMENT & PLAN NOTE
Given she is going to be in a car for 12 hours x 2 and she did have relief with a PO prednisone taper, we will repeat this now  SE again reviewed  She may take Tramadol 50 mg one tab BID prn- SE reviewed and she was advised to not drive while taking this   Heat to area  Continue stretching area  Consider PT after vacation

## 2018-11-20 NOTE — PROGRESS NOTES
Chief Complaint   Patient presents with    Pain     siatica left side     Assessment/Plan:    Acute left-sided low back pain with left-sided sciatica  Given she is going to be in a car for 12 hours x 2 and she did have relief with a PO prednisone taper, we will repeat this now  SE again reviewed  She may take Tramadol 50 mg one tab BID prn- SE reviewed and she was advised to not drive while taking this   Heat to area  Continue stretching area  Consider PT after vacation        Diagnoses and all orders for this visit:    Acute left-sided low back pain with left-sided sciatica  -     predniSONE 10 mg tablet; Take 4 tabs for 2 days, 3 tabs for 2 days, 2 tabs for 2 days and 1 tab for 2 days  -     traMADol (ULTRAM) 50 mg tablet; Take 1 tablet (50 mg total) by mouth 2 (two) times a day as needed for moderate pain          Subjective:      Patient ID: Ian Gray is a 37 y o  female      HPI     Pt presents by herself today for an acute visit  She was evaluated in our office on 10/23/18 for left lower back pain with sciatica   She was started on a PO Prednisone taper x 8 days which she has since completed   She also prescribed Flexeril 10 mg at HS prn   She reports having relief with the Prednisone initially  Unfortunately, symptoms started to return shortly after completion of the Prednisone taper  She has been working on different stretches which do seem to provide temporary relief    She will be in a car for 12 hours (driving to [place]) later this week for Thanksgiving and then home again  She will also be flying to [place] next month  She is concerned about sitting in the same position for a prolonged period of time    Her pain starts deep in her upper left buttock and radiates down to her left hip and wraps around to her left anterior shin  Pain is worse with sitting   Stretching, standing and moving help alleviate the pain   Heat also helps her pain     Pt cannot take NSAIDs due to having microscopic colitis (managed by GI)    The following portions of the patient's history were reviewed and updated as appropriate: allergies, current medications, past medical history, past social history and problem list     Review of Systems   Constitutional: Negative for activity change, appetite change, chills, diaphoresis, fatigue, fever and unexpected weight change  Respiratory: Negative for cough, chest tightness, shortness of breath and wheezing  Cardiovascular: Negative for chest pain, palpitations and leg swelling  Gastrointestinal: Negative for abdominal pain, constipation, diarrhea, nausea and vomiting  Musculoskeletal:        As noted in HPI   Skin: Negative for color change, pallor, rash and wound  Neurological: Negative for dizziness, weakness, light-headedness, numbness and headaches  Hematological: Negative for adenopathy  Does not bruise/bleed easily  Objective:      /84   Pulse 84   Temp 98 2 °F (36 8 °C) (Oral)   Resp 16   Ht 5' 4" (1 626 m)   Wt 83 9 kg (185 lb)   LMP 11/03/2018   BMI 31 76 kg/m²          Physical Exam   Constitutional: She is oriented to person, place, and time  She appears well-developed and well-nourished  No distress  HENT:   Head: Normocephalic and atraumatic  Cardiovascular: Normal rate, regular rhythm and normal heart sounds  No murmur heard  Pulmonary/Chest: Effort normal and breath sounds normal  No respiratory distress  She has no wheezes  Musculoskeletal:   Left lower lumbar region is tender to palpation, left deep buttock also with tenderness to palpation  No leg weakness, strength 5/5  Normal sensation to light touch   Slight antalgic gait on the left side   Neurological: She is alert and oriented to person, place, and time  No cranial nerve deficit  Skin: Skin is warm and dry  No rash noted  She is not diaphoretic  No erythema  No pallor  Psychiatric: She has a normal mood and affect

## 2018-12-08 DIAGNOSIS — K52.839 MICROSCOPIC COLITIS, UNSPECIFIED MICROSCOPIC COLITIS TYPE: ICD-10-CM

## 2018-12-09 DIAGNOSIS — F41.9 ANXIETY: ICD-10-CM

## 2018-12-09 RX ORDER — ALPRAZOLAM 0.5 MG/1
TABLET ORAL
Qty: 30 TABLET | Refills: 0 | Status: SHIPPED | OUTPATIENT
Start: 2018-12-09 | End: 2019-01-10 | Stop reason: SDUPTHER

## 2018-12-11 RX ORDER — BUDESONIDE 3 MG/1
6 CAPSULE, COATED PELLETS ORAL DAILY
Qty: 60 CAPSULE | Refills: 2 | Status: SHIPPED | OUTPATIENT
Start: 2018-12-11 | End: 2019-02-08 | Stop reason: SDUPTHER

## 2018-12-12 ENCOUNTER — OFFICE VISIT (OUTPATIENT)
Dept: BEHAVIORAL/MENTAL HEALTH CLINIC | Facility: CLINIC | Age: 43
End: 2018-12-12
Payer: COMMERCIAL

## 2018-12-12 DIAGNOSIS — F32.4 MAJOR DEPRESSIVE DISORDER WITH SINGLE EPISODE, IN PARTIAL REMISSION (HCC): ICD-10-CM

## 2018-12-12 DIAGNOSIS — F41.9 ANXIETY: Primary | ICD-10-CM

## 2018-12-12 PROCEDURE — 90834 PSYTX W PT 45 MINUTES: CPT | Performed by: SOCIAL WORKER

## 2018-12-12 NOTE — PSYCH
Treatment Plan Tracking    # 2Treatment Plan not completed within required time limits due to: treatment [ls created verbally will be signed a the next session

## 2018-12-12 NOTE — PSYCH
Kirstin Pittman  1975       Date of Initial Treatment Plan: 7/9/18   Date of Current Treatment Plan: 12/12/18    Treatment Plan Number 2     Strengths/Personal Resources for Self Care: Good mother, caring       Diagnosis:   No diagnosis found  Area of Needs:   Depression  Family    Long Term Goal 1: Feel better    Target Date: 3/12/18  Completion Date: TBD         Short Term Objectives for Goal 1:        Use healthy coping mechanisms       Talk about how I'm feeling       Take time for myself          Long Term Goal 2: Not let them get to me         Target Date: 3/12/18  Completion Date: TBD    Short Term Objectives for Goal 2:    Set boundaries   Communicate effectively   Don't enter in drama   Use my support system           GOAL 1: Modality: Individual 1-2x per month   Completion Date TBD and The person(s) responsible for carrying out the plan is  Ronda    GOAL 2: Modality: Individual 1-2x per month   Completion Date TBD and The person(s) responsible for carrying out the plan is  Radha Pickens Treatment Plan [facility]: Diagnosis and Treatment Plan explained to Jhoana Gomez relates understanding diagnosis and is agreeable to Treatment Plan         Client Comments : Please share your thoughts, feelings, need and/or experiences regarding your treatment plan:       __________________________________________________________________    __________________________________________________________________    __________________________________________________________________    __________________________________________________________________    _______________________________________                Patient signature, Date Time: __________________________________________             Physician cosigner signature, Date, Time: ________________________________

## 2018-12-12 NOTE — PSYCH
Psychotherapy Provided: Individual Psychotherapy 45 minutes     Length of time in session: 45 minutes, follow up in 2 week    Goals addressed in session: Goal 1 and Goal 2     Pain:      none    0    Current suicide risk : Low     D- Erinn Goddard stated that her Thanksgiving holiday was somewhat stressful due to her 's family  She stated that they have differing views on things and that the family becomes verbally combative  Processing her emotions and discussing ways to manage difficult situations with family members  Also continuing to discuss her future moving forward with her children moving on with their lives  Reviewing and revising her treatment plan  Giving supportive therapy  A- progress - Continuing to process her emotions  P-Continue treatment    [address]: Diagnosis and Treatment Plan explained to Mauricio Raúl relates understanding diagnosis and is agreeable to Treatment Plan   Yes

## 2019-01-10 DIAGNOSIS — F32.4 MAJOR DEPRESSIVE DISORDER WITH SINGLE EPISODE, IN PARTIAL REMISSION (HCC): ICD-10-CM

## 2019-01-10 DIAGNOSIS — F41.9 ANXIETY: ICD-10-CM

## 2019-01-10 RX ORDER — ALPRAZOLAM 0.5 MG/1
TABLET ORAL
Qty: 30 TABLET | Refills: 0 | Status: SHIPPED | OUTPATIENT
Start: 2019-01-10 | End: 2019-02-15 | Stop reason: SDUPTHER

## 2019-01-10 RX ORDER — PAROXETINE HYDROCHLORIDE 37.5 MG/1
37.5 TABLET, FILM COATED, EXTENDED RELEASE ORAL EVERY MORNING
Qty: 90 TABLET | Refills: 1 | Status: SHIPPED | OUTPATIENT
Start: 2019-01-10 | End: 2019-07-08 | Stop reason: SDUPTHER

## 2019-02-08 DIAGNOSIS — K52.839 MICROSCOPIC COLITIS, UNSPECIFIED MICROSCOPIC COLITIS TYPE: ICD-10-CM

## 2019-02-08 RX ORDER — BUDESONIDE 3 MG/1
6 CAPSULE, COATED PELLETS ORAL DAILY
Qty: 60 CAPSULE | Refills: 2 | Status: SHIPPED | OUTPATIENT
Start: 2019-02-08 | End: 2019-06-02 | Stop reason: SDUPTHER

## 2019-02-15 DIAGNOSIS — F41.9 ANXIETY: ICD-10-CM

## 2019-02-15 RX ORDER — ALPRAZOLAM 0.5 MG/1
TABLET ORAL
Qty: 30 TABLET | Refills: 0 | Status: SHIPPED | OUTPATIENT
Start: 2019-02-15 | End: 2019-03-20 | Stop reason: SDUPTHER

## 2019-02-15 NOTE — TELEPHONE ENCOUNTER
Patient is requesting a refill of ALPRAZolam (XANAX) 0 5 mg, take 1/2 tablet 2x a day, 30-day supply to [facility], [address] [place] [address], [facility]  Patient can be contacted at [phone] with any questions

## 2019-02-20 ENCOUNTER — OFFICE VISIT (OUTPATIENT)
Dept: BEHAVIORAL/MENTAL HEALTH CLINIC | Facility: CLINIC | Age: 44
End: 2019-02-20
Payer: COMMERCIAL

## 2019-02-20 DIAGNOSIS — F32.4 MAJOR DEPRESSIVE DISORDER WITH SINGLE EPISODE, IN PARTIAL REMISSION (HCC): ICD-10-CM

## 2019-02-20 DIAGNOSIS — F41.9 ANXIETY: Primary | ICD-10-CM

## 2019-02-20 PROCEDURE — 90834 PSYTX W PT 45 MINUTES: CPT | Performed by: SOCIAL WORKER

## 2019-02-20 NOTE — PSYCH
Psychotherapy Provided: Individual Psychotherapy 45 minutes     Length of time in session: 45 minutes, follow up in 2 week    Goals addressed in session: Goal 1 and Goal 2     Pain:      none    0    Current suicide risk : Low     D- Surjit Dominguez stated that she has felt recently as though she were in a "rut"  She stated that she had found herself isolating and feeling less motivated  She stated that she had made and effort to leave the house more and interact with others  Discussing the transition that she is facing with her oldest daughter graduating from high school and her consideration of finding employment  Discussing her needs as well as her role in the family and what she feels she wants to pursue  Also discussing her relationship with her  in regards to this and ways for her to communicate with him as well as set boundaries when necessary  Reviewing and signing her treatment plan  Giving supportive therapy  A- Progress - Continuing to process her emotions  P- Continue treatment    Behavioral Health Treatment Plan [facility]: Diagnosis and Treatment Plan explained to Brown Alvarado relates understanding diagnosis and is agreeable to Treatment Plan   Yes

## 2019-03-06 ENCOUNTER — OFFICE VISIT (OUTPATIENT)
Dept: BEHAVIORAL/MENTAL HEALTH CLINIC | Facility: CLINIC | Age: 44
End: 2019-03-06
Payer: COMMERCIAL

## 2019-03-06 DIAGNOSIS — F41.9 ANXIETY: Primary | ICD-10-CM

## 2019-03-06 DIAGNOSIS — F32.4 MAJOR DEPRESSIVE DISORDER WITH SINGLE EPISODE, IN PARTIAL REMISSION (HCC): ICD-10-CM

## 2019-03-06 PROCEDURE — 90834 PSYTX W PT 45 MINUTES: CPT | Performed by: SOCIAL WORKER
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
## 2019-03-06 NOTE — PSYCH
Ian Gray  1975       Date of Initial Treatment Plan: 7/9/18   Date of Current Treatment Plan: 03/06/19    Treatment Plan Number 3     Strengths/Personal Resources for Self Care: Good mother, caring           Diagnosis:   No diagnosis found  Area of Needs:   Depression  Family           Long Term Goal 1: Feel better        Target Date: 6/6/19  Completion Date:          Short Term Objectives for Goal 1:        Use healthy coping mechanisms       Talk about how I'm feeling       Take time for myself      Long Term Goal 2: Not let them get to me          Target Date: 6/6/19  Completion Date: TBD    Short Term Objectives for Goal 2:    Set boundaries   Communicate effectively   Don't enter in drama   Use my support system         GOAL 1: Modality: Individual 1-2x per month   Completion Date TBD and The person(s) responsible for carrying out the plan is  Ronda    GOAL 2: Modality: Individual 1-2x per month   Completion Date TBD and The person(s) responsible for carrying out the plan is  [address]: Diagnosis and Treatment Plan explained to Sly Lindsay relates understanding diagnosis and is agreeable to Treatment Plan         Client Comments : Please share your thoughts, feelings, need and/or experiences regarding your treatment plan:       __________________________________________________________________    __________________________________________________________________    __________________________________________________________________    __________________________________________________________________    _______________________________________                Patient signature, Date Time: __________________________________________             Physician cosigner signature, Date, Time: ________________________________

## 2019-03-20 ENCOUNTER — OFFICE VISIT (OUTPATIENT)
Dept: BEHAVIORAL/MENTAL HEALTH CLINIC | Facility: CLINIC | Age: 44
End: 2019-03-20
Payer: COMMERCIAL

## 2019-03-20 DIAGNOSIS — F41.9 ANXIETY: ICD-10-CM

## 2019-03-20 DIAGNOSIS — F41.9 ANXIETY: Primary | ICD-10-CM

## 2019-03-20 DIAGNOSIS — F32.4 MAJOR DEPRESSIVE DISORDER WITH SINGLE EPISODE, IN PARTIAL REMISSION (HCC): ICD-10-CM

## 2019-03-20 PROCEDURE — 90834 PSYTX W PT 45 MINUTES: CPT | Performed by: SOCIAL WORKER

## 2019-03-20 RX ORDER — ALPRAZOLAM 0.5 MG/1
TABLET ORAL
Qty: 30 TABLET | Refills: 0 | Status: SHIPPED | OUTPATIENT
Start: 2019-03-20 | End: 2019-04-24 | Stop reason: SDUPTHER

## 2019-03-20 NOTE — PSYCH
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 [place]  She stated that she is excited, but nervous as well  She stated that she feels that her  has been somewhat negative regarding this and in general  Discussing ways for her to set boundaries with him and communicate her feelings to him when he behaves this way  Also discussing the changing of the dynamic in her home due to hr children getting older and ways for her to delegate responsibility  Reviewing and signing her treatment plan  Giving supportive therapy  A- Progress - Continuing to process her emotions  P-Continue treatment    [address]: Diagnosis and Treatment Plan explained to Maykel Bar relates understanding diagnosis and is agreeable to Treatment Plan   Yes

## 2019-04-03 ENCOUNTER — OFFICE VISIT (OUTPATIENT)
Dept: FAMILY MEDICINE CLINIC | Facility: CLINIC | Age: 44
End: 2019-04-03
Payer: COMMERCIAL

## 2019-04-03 ENCOUNTER — OFFICE VISIT (OUTPATIENT)
Dept: BEHAVIORAL/MENTAL HEALTH CLINIC | Facility: CLINIC | Age: 44
End: 2019-04-03
Payer: COMMERCIAL

## 2019-04-03 VITALS
BODY MASS INDEX: 34.86 KG/M2 | DIASTOLIC BLOOD PRESSURE: 92 MMHG | RESPIRATION RATE: 16 BRPM | SYSTOLIC BLOOD PRESSURE: 138 MMHG | TEMPERATURE: 98.5 F | HEIGHT: 64 IN | OXYGEN SATURATION: 99 % | WEIGHT: 204.2 LBS | HEART RATE: 68 BPM

## 2019-04-03 DIAGNOSIS — G89.29 CHRONIC PAIN OF BOTH KNEES: Primary | ICD-10-CM

## 2019-04-03 DIAGNOSIS — F41.9 ANXIETY: Primary | ICD-10-CM

## 2019-04-03 DIAGNOSIS — M25.561 CHRONIC PAIN OF BOTH KNEES: Primary | ICD-10-CM

## 2019-04-03 DIAGNOSIS — F32.4 MAJOR DEPRESSIVE DISORDER WITH SINGLE EPISODE, IN PARTIAL REMISSION (HCC): ICD-10-CM

## 2019-04-03 DIAGNOSIS — M25.562 CHRONIC PAIN OF BOTH KNEES: Primary | ICD-10-CM

## 2019-04-03 PROCEDURE — 90834 PSYTX W PT 45 MINUTES: CPT | Performed by: SOCIAL WORKER

## 2019-04-03 PROCEDURE — 99214 OFFICE O/P EST MOD 30 MIN: CPT | Performed by: FAMILY MEDICINE

## 2019-04-03 PROCEDURE — 3008F BODY MASS INDEX DOCD: CPT | Performed by: FAMILY MEDICINE

## 2019-04-04 ENCOUNTER — OFFICE VISIT (OUTPATIENT)
Dept: GASTROENTEROLOGY | Facility: MEDICAL CENTER | Age: 44
End: 2019-04-04
Payer: COMMERCIAL

## 2019-04-04 VITALS
TEMPERATURE: 98.2 F | SYSTOLIC BLOOD PRESSURE: 120 MMHG | DIASTOLIC BLOOD PRESSURE: 70 MMHG | WEIGHT: 202 LBS | HEART RATE: 60 BPM | HEIGHT: 64 IN | BODY MASS INDEX: 34.49 KG/M2

## 2019-04-04 DIAGNOSIS — K52.831 COLLAGENOUS COLITIS: Primary | ICD-10-CM

## 2019-04-04 DIAGNOSIS — R10.84 GENERALIZED ABDOMINAL PAIN: ICD-10-CM

## 2019-04-04 DIAGNOSIS — R19.5 LOOSE STOOLS: ICD-10-CM

## 2019-04-04 PROCEDURE — 99214 OFFICE O/P EST MOD 30 MIN: CPT | Performed by: PHYSICIAN ASSISTANT

## 2019-04-11 ENCOUNTER — TELEPHONE (OUTPATIENT)
Dept: BEHAVIORAL/MENTAL HEALTH CLINIC | Facility: CLINIC | Age: 44
End: 2019-04-11

## 2019-04-17 ENCOUNTER — OFFICE VISIT (OUTPATIENT)
Dept: BEHAVIORAL/MENTAL HEALTH CLINIC | Facility: CLINIC | Age: 44
End: 2019-04-17
Payer: COMMERCIAL

## 2019-04-17 DIAGNOSIS — F32.4 MAJOR DEPRESSIVE DISORDER WITH SINGLE EPISODE, IN PARTIAL REMISSION (HCC): Primary | ICD-10-CM

## 2019-04-17 DIAGNOSIS — F41.9 ANXIETY: ICD-10-CM

## 2019-04-17 PROCEDURE — 90834 PSYTX W PT 45 MINUTES: CPT | Performed by: SOCIAL WORKER

## 2019-04-22 ENCOUNTER — TRANSCRIBE ORDERS (OUTPATIENT)
Dept: RADIOLOGY | Facility: HOSPITAL | Age: 44
End: 2019-04-22

## 2019-04-22 ENCOUNTER — HOSPITAL ENCOUNTER (OUTPATIENT)
Dept: RADIOLOGY | Facility: HOSPITAL | Age: 44
Discharge: HOME | End: 2019-04-22
Payer: COMMERCIAL

## 2019-04-22 DIAGNOSIS — M25.561 CHRONIC PAIN OF BOTH KNEES: ICD-10-CM

## 2019-04-22 DIAGNOSIS — M25.562 CHRONIC PAIN OF BOTH KNEES: ICD-10-CM

## 2019-04-22 DIAGNOSIS — G89.29 CHRONIC PAIN OF BOTH KNEES: ICD-10-CM

## 2019-04-22 PROCEDURE — 73562 X-RAY EXAM OF KNEE 3: CPT

## 2019-04-23 ENCOUNTER — CONSULT (OUTPATIENT)
Dept: OBGYN CLINIC | Facility: HOSPITAL | Age: 44
End: 2019-04-23
Payer: COMMERCIAL

## 2019-04-23 VITALS
BODY MASS INDEX: 33.8 KG/M2 | DIASTOLIC BLOOD PRESSURE: 80 MMHG | WEIGHT: 198 LBS | HEIGHT: 64 IN | HEART RATE: 63 BPM | SYSTOLIC BLOOD PRESSURE: 121 MMHG

## 2019-04-23 DIAGNOSIS — M25.561 CHRONIC PAIN OF BOTH KNEES: ICD-10-CM

## 2019-04-23 DIAGNOSIS — L40.50 PSORIATIC ARTHRITIS (HCC): Primary | ICD-10-CM

## 2019-04-23 DIAGNOSIS — M25.562 CHRONIC PAIN OF BOTH KNEES: ICD-10-CM

## 2019-04-23 DIAGNOSIS — G89.29 CHRONIC PAIN OF BOTH KNEES: ICD-10-CM

## 2019-04-23 PROCEDURE — 99243 OFF/OP CNSLTJ NEW/EST LOW 30: CPT | Performed by: ORTHOPAEDIC SURGERY

## 2019-04-24 DIAGNOSIS — F41.9 ANXIETY: ICD-10-CM

## 2019-04-24 RX ORDER — ALPRAZOLAM 0.5 MG/1
TABLET ORAL
Qty: 30 TABLET | Refills: 0 | Status: SHIPPED | OUTPATIENT
Start: 2019-04-24 | End: 2019-06-02 | Stop reason: SDUPTHER

## 2019-04-30 ENCOUNTER — APPOINTMENT (OUTPATIENT)
Dept: LAB | Facility: HOSPITAL | Age: 44
End: 2019-04-30
Attending: ORTHOPAEDIC SURGERY
Payer: COMMERCIAL

## 2019-04-30 ENCOUNTER — EVALUATION (OUTPATIENT)
Dept: PHYSICAL THERAPY | Facility: CLINIC | Age: 44
End: 2019-04-30
Payer: COMMERCIAL

## 2019-04-30 DIAGNOSIS — M25.562 CHRONIC PAIN OF BOTH KNEES: ICD-10-CM

## 2019-04-30 DIAGNOSIS — M25.562 CHRONIC PAIN OF BOTH KNEES: Primary | ICD-10-CM

## 2019-04-30 DIAGNOSIS — L40.0 PSORIASIS VULGARIS: Primary | ICD-10-CM

## 2019-04-30 DIAGNOSIS — G89.29 CHRONIC PAIN OF BOTH KNEES: ICD-10-CM

## 2019-04-30 DIAGNOSIS — L40.50 PSORIATIC ARTHRITIS (HCC): ICD-10-CM

## 2019-04-30 DIAGNOSIS — M25.561 CHRONIC PAIN OF BOTH KNEES: Primary | ICD-10-CM

## 2019-04-30 DIAGNOSIS — G89.29 CHRONIC PAIN OF BOTH KNEES: Primary | ICD-10-CM

## 2019-04-30 DIAGNOSIS — M25.561 CHRONIC PAIN OF BOTH KNEES: ICD-10-CM

## 2019-04-30 LAB
ALBUMIN SERPL BCP-MCNC: 3.9 G/DL (ref 3.5–5)
ALP SERPL-CCNC: 58 U/L (ref 46–116)
ALT SERPL W P-5'-P-CCNC: 35 U/L (ref 12–78)
ANION GAP SERPL CALCULATED.3IONS-SCNC: 4 MMOL/L (ref 4–13)
AST SERPL W P-5'-P-CCNC: 20 U/L (ref 5–45)
BASOPHILS # BLD AUTO: 0.11 THOUSANDS/ΜL (ref 0–0.1)
BASOPHILS NFR BLD AUTO: 1 % (ref 0–1)
BILIRUB SERPL-MCNC: 0.52 MG/DL (ref 0.2–1)
BUN SERPL-MCNC: 9 MG/DL (ref 5–25)
CALCIUM SERPL-MCNC: 8.5 MG/DL (ref 8.3–10.1)
CHLORIDE SERPL-SCNC: 103 MMOL/L (ref 100–108)
CO2 SERPL-SCNC: 27 MMOL/L (ref 21–32)
CREAT SERPL-MCNC: 0.84 MG/DL (ref 0.6–1.3)
CRP SERPL QL: <3 MG/L
EOSINOPHIL # BLD AUTO: 0.39 THOUSAND/ΜL (ref 0–0.61)
EOSINOPHIL NFR BLD AUTO: 5 % (ref 0–6)
ERYTHROCYTE [DISTWIDTH] IN BLOOD BY AUTOMATED COUNT: 12.4 % (ref 11.6–15.1)
ERYTHROCYTE [SEDIMENTATION RATE] IN BLOOD: 12 MM/HOUR (ref 0–20)
GFR SERPL CREATININE-BSD FRML MDRD: 84 ML/MIN/1.73SQ M
GLUCOSE SERPL-MCNC: 97 MG/DL (ref 65–140)
HCT VFR BLD AUTO: 41.5 % (ref 34.8–46.1)
HGB BLD-MCNC: 13.9 G/DL (ref 11.5–15.4)
IMM GRANULOCYTES # BLD AUTO: 0.02 THOUSAND/UL (ref 0–0.2)
IMM GRANULOCYTES NFR BLD AUTO: 0 % (ref 0–2)
LYMPHOCYTES # BLD AUTO: 2.93 THOUSANDS/ΜL (ref 0.6–4.47)
LYMPHOCYTES NFR BLD AUTO: 38 % (ref 14–44)
MCH RBC QN AUTO: 29.3 PG (ref 26.8–34.3)
MCHC RBC AUTO-ENTMCNC: 33.5 G/DL (ref 31.4–37.4)
MCV RBC AUTO: 88 FL (ref 82–98)
MONOCYTES # BLD AUTO: 0.66 THOUSAND/ΜL (ref 0.17–1.22)
MONOCYTES NFR BLD AUTO: 9 % (ref 4–12)
NEUTROPHILS # BLD AUTO: 3.63 THOUSANDS/ΜL (ref 1.85–7.62)
NEUTS SEG NFR BLD AUTO: 47 % (ref 43–75)
NRBC BLD AUTO-RTO: 0 /100 WBCS
PLATELET # BLD AUTO: 310 THOUSANDS/UL (ref 149–390)
PMV BLD AUTO: 9.7 FL (ref 8.9–12.7)
POTASSIUM SERPL-SCNC: 4 MMOL/L (ref 3.5–5.3)
PROT SERPL-MCNC: 7.7 G/DL (ref 6.4–8.2)
RBC # BLD AUTO: 4.74 MILLION/UL (ref 3.81–5.12)
RHEUMATOID FACT SER QL LA: NEGATIVE
SODIUM SERPL-SCNC: 134 MMOL/L (ref 136–145)
WBC # BLD AUTO: 7.74 THOUSAND/UL (ref 4.31–10.16)

## 2019-04-30 PROCEDURE — 97161 PT EVAL LOW COMPLEX 20 MIN: CPT | Performed by: PHYSICAL THERAPIST

## 2019-04-30 PROCEDURE — 86430 RHEUMATOID FACTOR TEST QUAL: CPT

## 2019-04-30 PROCEDURE — 86200 CCP ANTIBODY: CPT

## 2019-04-30 PROCEDURE — 86140 C-REACTIVE PROTEIN: CPT

## 2019-04-30 PROCEDURE — 85652 RBC SED RATE AUTOMATED: CPT

## 2019-04-30 PROCEDURE — 85025 COMPLETE CBC W/AUTO DIFF WBC: CPT

## 2019-04-30 PROCEDURE — 80053 COMPREHEN METABOLIC PANEL: CPT

## 2019-04-30 PROCEDURE — 36415 COLL VENOUS BLD VENIPUNCTURE: CPT

## 2019-05-01 ENCOUNTER — OFFICE VISIT (OUTPATIENT)
Dept: BEHAVIORAL/MENTAL HEALTH CLINIC | Facility: CLINIC | Age: 44
End: 2019-05-01
Payer: COMMERCIAL

## 2019-05-01 DIAGNOSIS — F41.9 ANXIETY: Primary | ICD-10-CM

## 2019-05-01 DIAGNOSIS — F32.4 MAJOR DEPRESSIVE DISORDER WITH SINGLE EPISODE, IN PARTIAL REMISSION (HCC): ICD-10-CM

## 2019-05-01 PROCEDURE — 90834 PSYTX W PT 45 MINUTES: CPT | Performed by: SOCIAL WORKER

## 2019-05-02 LAB — CCP IGA+IGG SERPL IA-ACNC: 6 UNITS (ref 0–19)

## 2019-05-03 ENCOUNTER — OFFICE VISIT (OUTPATIENT)
Dept: PHYSICAL THERAPY | Facility: CLINIC | Age: 44
End: 2019-05-03
Payer: COMMERCIAL

## 2019-05-03 DIAGNOSIS — G89.29 CHRONIC PAIN OF BOTH KNEES: Primary | ICD-10-CM

## 2019-05-03 DIAGNOSIS — L40.50 PSORIATIC ARTHRITIS (HCC): ICD-10-CM

## 2019-05-03 DIAGNOSIS — M25.562 CHRONIC PAIN OF BOTH KNEES: Primary | ICD-10-CM

## 2019-05-03 DIAGNOSIS — M25.561 CHRONIC PAIN OF BOTH KNEES: Primary | ICD-10-CM

## 2019-05-03 PROCEDURE — 97112 NEUROMUSCULAR REEDUCATION: CPT

## 2019-05-03 PROCEDURE — 97110 THERAPEUTIC EXERCISES: CPT

## 2019-05-07 ENCOUNTER — APPOINTMENT (OUTPATIENT)
Dept: PHYSICAL THERAPY | Facility: CLINIC | Age: 44
End: 2019-05-07
Payer: COMMERCIAL

## 2019-05-09 ENCOUNTER — OFFICE VISIT (OUTPATIENT)
Dept: PHYSICAL THERAPY | Facility: CLINIC | Age: 44
End: 2019-05-09
Payer: COMMERCIAL

## 2019-05-09 DIAGNOSIS — G89.29 CHRONIC PAIN OF BOTH KNEES: Primary | ICD-10-CM

## 2019-05-09 DIAGNOSIS — M25.561 CHRONIC PAIN OF BOTH KNEES: Primary | ICD-10-CM

## 2019-05-09 DIAGNOSIS — L40.50 PSORIATIC ARTHRITIS (HCC): ICD-10-CM

## 2019-05-09 DIAGNOSIS — M25.562 CHRONIC PAIN OF BOTH KNEES: Primary | ICD-10-CM

## 2019-05-09 PROCEDURE — 97112 NEUROMUSCULAR REEDUCATION: CPT

## 2019-05-09 PROCEDURE — 97110 THERAPEUTIC EXERCISES: CPT

## 2019-05-14 ENCOUNTER — APPOINTMENT (OUTPATIENT)
Dept: PHYSICAL THERAPY | Facility: CLINIC | Age: 44
End: 2019-05-14
Payer: COMMERCIAL

## 2019-05-15 ENCOUNTER — OFFICE VISIT (OUTPATIENT)
Dept: BEHAVIORAL/MENTAL HEALTH CLINIC | Facility: CLINIC | Age: 44
End: 2019-05-15
Payer: COMMERCIAL

## 2019-05-15 DIAGNOSIS — F32.4 MAJOR DEPRESSIVE DISORDER WITH SINGLE EPISODE, IN PARTIAL REMISSION (HCC): ICD-10-CM

## 2019-05-15 DIAGNOSIS — F41.9 ANXIETY: Primary | ICD-10-CM

## 2019-05-15 PROCEDURE — 90834 PSYTX W PT 45 MINUTES: CPT | Performed by: SOCIAL WORKER

## 2019-05-30 ENCOUNTER — APPOINTMENT (OUTPATIENT)
Dept: PHYSICAL THERAPY | Facility: CLINIC | Age: 44
End: 2019-05-30
Payer: COMMERCIAL

## 2019-05-30 ENCOUNTER — OFFICE VISIT (OUTPATIENT)
Dept: BEHAVIORAL/MENTAL HEALTH CLINIC | Facility: CLINIC | Age: 44
End: 2019-05-30
Payer: COMMERCIAL

## 2019-05-30 DIAGNOSIS — F41.9 ANXIETY: ICD-10-CM

## 2019-05-30 DIAGNOSIS — F32.4 MAJOR DEPRESSIVE DISORDER WITH SINGLE EPISODE, IN PARTIAL REMISSION (HCC): Primary | ICD-10-CM

## 2019-05-30 PROCEDURE — 90834 PSYTX W PT 45 MINUTES: CPT | Performed by: SOCIAL WORKER

## 2019-06-02 DIAGNOSIS — F41.9 ANXIETY: ICD-10-CM

## 2019-06-02 DIAGNOSIS — K52.839 MICROSCOPIC COLITIS, UNSPECIFIED MICROSCOPIC COLITIS TYPE: ICD-10-CM

## 2019-06-03 RX ORDER — ALPRAZOLAM 0.5 MG/1
TABLET ORAL
Qty: 30 TABLET | Refills: 0 | Status: SHIPPED | OUTPATIENT
Start: 2019-06-03 | End: 2019-07-03 | Stop reason: SDUPTHER

## 2019-06-03 RX ORDER — BUDESONIDE 3 MG/1
CAPSULE, COATED PELLETS ORAL
Qty: 60 CAPSULE | Refills: 0 | Status: SHIPPED | OUTPATIENT
Start: 2019-06-03 | End: 2019-09-24 | Stop reason: ALTCHOICE

## 2019-06-11 ENCOUNTER — OFFICE VISIT (OUTPATIENT)
Dept: BEHAVIORAL/MENTAL HEALTH CLINIC | Facility: CLINIC | Age: 44
End: 2019-06-11
Payer: COMMERCIAL

## 2019-06-11 DIAGNOSIS — F32.4 MAJOR DEPRESSIVE DISORDER WITH SINGLE EPISODE, IN PARTIAL REMISSION (HCC): ICD-10-CM

## 2019-06-11 DIAGNOSIS — F41.9 ANXIETY: Primary | ICD-10-CM

## 2019-06-11 PROCEDURE — 90834 PSYTX W PT 45 MINUTES: CPT | Performed by: SOCIAL WORKER

## 2019-07-01 ENCOUNTER — SOCIAL WORK (OUTPATIENT)
Dept: BEHAVIORAL/MENTAL HEALTH CLINIC | Facility: CLINIC | Age: 44
End: 2019-07-01
Payer: COMMERCIAL

## 2019-07-01 DIAGNOSIS — K52.839 MICROSCOPIC COLITIS, UNSPECIFIED MICROSCOPIC COLITIS TYPE: ICD-10-CM

## 2019-07-01 DIAGNOSIS — F41.9 ANXIETY: Primary | ICD-10-CM

## 2019-07-01 DIAGNOSIS — F32.4 MAJOR DEPRESSIVE DISORDER WITH SINGLE EPISODE, IN PARTIAL REMISSION (HCC): ICD-10-CM

## 2019-07-01 PROCEDURE — 90834 PSYTX W PT 45 MINUTES: CPT | Performed by: SOCIAL WORKER

## 2019-07-01 RX ORDER — BUDESONIDE 3 MG/1
CAPSULE, COATED PELLETS ORAL
Qty: 60 CAPSULE | Refills: 0 | OUTPATIENT
Start: 2019-07-01

## 2019-07-03 DIAGNOSIS — F41.9 ANXIETY: ICD-10-CM

## 2019-07-03 RX ORDER — ALPRAZOLAM 0.5 MG/1
TABLET ORAL
Qty: 30 TABLET | Refills: 0 | Status: SHIPPED | OUTPATIENT
Start: 2019-07-03 | End: 2019-08-11 | Stop reason: SDUPTHER

## 2019-07-08 DIAGNOSIS — F32.4 MAJOR DEPRESSIVE DISORDER WITH SINGLE EPISODE, IN PARTIAL REMISSION (HCC): ICD-10-CM

## 2019-07-08 RX ORDER — PAROXETINE HYDROCHLORIDE 37.5 MG/1
37.5 TABLET, FILM COATED, EXTENDED RELEASE ORAL EVERY MORNING
Qty: 90 TABLET | Refills: 1 | Status: SHIPPED | OUTPATIENT
Start: 2019-07-08 | End: 2020-01-03

## 2019-07-09 DIAGNOSIS — K52.839 MICROSCOPIC COLITIS, UNSPECIFIED MICROSCOPIC COLITIS TYPE: ICD-10-CM

## 2019-07-09 RX ORDER — BUDESONIDE 3 MG/1
3 CAPSULE, COATED PELLETS ORAL 2 TIMES DAILY
Qty: 90 CAPSULE | Refills: 3 | OUTPATIENT
Start: 2019-07-09 | End: 2020-01-05

## 2019-07-25 ENCOUNTER — SOCIAL WORK (OUTPATIENT)
Dept: BEHAVIORAL/MENTAL HEALTH CLINIC | Facility: CLINIC | Age: 44
End: 2019-07-25
Payer: COMMERCIAL

## 2019-07-25 DIAGNOSIS — F41.9 ANXIETY: Primary | ICD-10-CM

## 2019-07-25 DIAGNOSIS — F32.4 MAJOR DEPRESSIVE DISORDER WITH SINGLE EPISODE, IN PARTIAL REMISSION (HCC): ICD-10-CM

## 2019-07-25 PROCEDURE — 90834 PSYTX W PT 45 MINUTES: CPT | Performed by: SOCIAL WORKER

## 2019-07-25 NOTE — PSYCH
Psychotherapy Provided: Individual Psychotherapy 45 minutes     Length of time in session: 45 minutes, follow up in 2 week    Goals addressed in session: Goal 1 and Goal 2     Pain:      none    0    Current suicide risk : Low     DDaniel Bond stated that she is upset with her father in law  She stated that he did not attend her daughter's graduation  She stated that he called her and then began to speak about politics  She stated that she did not want to discuss this with him  She had to end the call because he would not respect her wishes  She is also stressed due to her daughter leaving for college and her  pressuring her to get a job  Processing her emotions and discussing ways for her to continue to set boundaries with others  Giving supportive therapy  A- Progress - Continuing to process her emotions  P-Continue treatment    [address]: Diagnosis and Treatment Plan explained to Annette Patton relates understanding diagnosis and is agreeable to Treatment Plan   Yes

## 2019-08-07 ENCOUNTER — SOCIAL WORK (OUTPATIENT)
Dept: BEHAVIORAL/MENTAL HEALTH CLINIC | Facility: CLINIC | Age: 44
End: 2019-08-07
Payer: COMMERCIAL

## 2019-08-07 DIAGNOSIS — F41.9 ANXIETY: ICD-10-CM

## 2019-08-07 DIAGNOSIS — F32.4 MAJOR DEPRESSIVE DISORDER WITH SINGLE EPISODE, IN PARTIAL REMISSION (HCC): Primary | ICD-10-CM

## 2019-08-07 PROCEDURE — 90834 PSYTX W PT 45 MINUTES: CPT | Performed by: SOCIAL WORKER

## 2019-08-07 NOTE — PSYCH
Psychotherapy Provided: Individual Psychotherapy 45 minutes     Length of time in session: 45 minutes, follow up in 2 week    Goals addressed in session: Goal 1 and Goal 2     Pain:      none    0    Current suicide risk : Low     D- [facility] stated that she is happy that the political season is picking up  She discussed her increasing involvement as the election nears  She also discussed her relationship with her  and his tendency to disregard her needs  Processing her emotions and discussing ways for her to continue to assert herself in the relationship  Also continuing to discuss practicing self care and initiating things that she enjoys doing for herself  Reviewing and signing her treatment plan  Giving supportive therapy  A- Progress- Continuing to process her emotions  P- Continue treatment    Behavioral Health Treatment Plan [facility]: Diagnosis and Treatment Plan explained to Rachna Pérez relates understanding diagnosis and is agreeable to Treatment Plan   Yes

## 2019-08-11 DIAGNOSIS — F41.9 ANXIETY: ICD-10-CM

## 2019-08-13 RX ORDER — ALPRAZOLAM 0.5 MG/1
TABLET ORAL
Qty: 30 TABLET | Refills: 0 | Status: SHIPPED | OUTPATIENT
Start: 2019-08-13 | End: 2019-09-15 | Stop reason: SDUPTHER

## 2019-08-21 ENCOUNTER — SOCIAL WORK (OUTPATIENT)
Dept: BEHAVIORAL/MENTAL HEALTH CLINIC | Facility: CLINIC | Age: 44
End: 2019-08-21
Payer: COMMERCIAL

## 2019-08-21 DIAGNOSIS — F41.9 ANXIETY: ICD-10-CM

## 2019-08-21 DIAGNOSIS — F32.4 MAJOR DEPRESSIVE DISORDER WITH SINGLE EPISODE, IN PARTIAL REMISSION (HCC): Primary | ICD-10-CM

## 2019-08-21 PROCEDURE — 90834 PSYTX W PT 45 MINUTES: CPT | Performed by: SOCIAL WORKER
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
## 2019-09-04 ENCOUNTER — SOCIAL WORK (OUTPATIENT)
Dept: BEHAVIORAL/MENTAL HEALTH CLINIC | Facility: CLINIC | Age: 44
End: 2019-09-04
Payer: COMMERCIAL

## 2019-09-04 DIAGNOSIS — F41.9 ANXIETY: Primary | ICD-10-CM

## 2019-09-04 DIAGNOSIS — F32.4 MAJOR DEPRESSIVE DISORDER WITH SINGLE EPISODE, IN PARTIAL REMISSION (HCC): ICD-10-CM

## 2019-09-04 PROCEDURE — 90834 PSYTX W PT 45 MINUTES: CPT | Performed by: SOCIAL WORKER
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
## 2019-09-15 DIAGNOSIS — F41.9 ANXIETY: ICD-10-CM

## 2019-09-16 RX ORDER — ALPRAZOLAM 0.5 MG/1
TABLET ORAL
Qty: 30 TABLET | Refills: 0 | Status: SHIPPED | OUTPATIENT
Start: 2019-09-16 | End: 2019-10-18 | Stop reason: SDUPTHER

## 2019-09-16 NOTE — TELEPHONE ENCOUNTER
1st call: Lmom for patient to call/schedule a follow-up appointment prior to next medication refill

## 2019-09-19 ENCOUNTER — SOCIAL WORK (OUTPATIENT)
Dept: BEHAVIORAL/MENTAL HEALTH CLINIC | Facility: CLINIC | Age: 44
End: 2019-09-19
Payer: COMMERCIAL

## 2019-09-19 DIAGNOSIS — F41.9 ANXIETY: Primary | ICD-10-CM

## 2019-09-19 DIAGNOSIS — F32.4 MAJOR DEPRESSIVE DISORDER WITH SINGLE EPISODE, IN PARTIAL REMISSION (HCC): ICD-10-CM

## 2019-09-19 PROCEDURE — 90834 PSYTX W PT 45 MINUTES: CPT | Performed by: SOCIAL WORKER

## 2019-09-20 NOTE — PSYCH
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  She stated that she is excited about this  She discussed her  and the difficulty that they continue to have with communication  She stated that her youngest daughter was admitted to [facility] for suicidal ideation  She stated that she is better now but that it was very difficult while it was occurring  Discussing ways for her to cope with the stress sin her life as well as continuing to assert herself wit he   Giving supportive therapy  Tuan Bernard presented as upbeat and positive  Continuing to work towards completing her treatment goals  P-Continue treatment    [address]: Diagnosis and Treatment Plan explained to Ori Hu relates understanding diagnosis and is agreeable to Treatment Plan   Yes

## 2019-09-24 ENCOUNTER — OFFICE VISIT (OUTPATIENT)
Dept: FAMILY MEDICINE CLINIC | Facility: CLINIC | Age: 44
End: 2019-09-24
Payer: COMMERCIAL

## 2019-09-24 VITALS
RESPIRATION RATE: 16 BRPM | HEART RATE: 72 BPM | OXYGEN SATURATION: 98 % | HEIGHT: 64 IN | WEIGHT: 194 LBS | TEMPERATURE: 98.6 F | BODY MASS INDEX: 33.12 KG/M2 | SYSTOLIC BLOOD PRESSURE: 130 MMHG | DIASTOLIC BLOOD PRESSURE: 92 MMHG

## 2019-09-24 DIAGNOSIS — Z12.39 SCREENING FOR BREAST CANCER: ICD-10-CM

## 2019-09-24 DIAGNOSIS — E78.2 MIXED HYPERLIPIDEMIA: Primary | ICD-10-CM

## 2019-09-24 DIAGNOSIS — F41.9 ANXIETY: ICD-10-CM

## 2019-09-24 DIAGNOSIS — Z23 NEED FOR INFLUENZA VACCINATION: ICD-10-CM

## 2019-09-24 DIAGNOSIS — E66.9 OBESITY (BMI 30-39.9): ICD-10-CM

## 2019-09-24 DIAGNOSIS — L40.9 PSORIASIS: ICD-10-CM

## 2019-09-24 PROBLEM — E66.09 OBESITY DUE TO EXCESS CALORIES, UNSPECIFIED OBESITY SEVERITY: Status: RESOLVED | Noted: 2017-03-03 | Resolved: 2019-09-24

## 2019-09-24 PROCEDURE — 90471 IMMUNIZATION ADMIN: CPT

## 2019-09-24 PROCEDURE — 90682 RIV4 VACC RECOMBINANT DNA IM: CPT

## 2019-09-24 PROCEDURE — 3008F BODY MASS INDEX DOCD: CPT | Performed by: FAMILY MEDICINE

## 2019-09-24 PROCEDURE — 99214 OFFICE O/P EST MOD 30 MIN: CPT | Performed by: FAMILY MEDICINE

## 2019-09-24 NOTE — PROGRESS NOTES
Chief Complaint   Patient presents with    Follow-up     For anxiety and major depressive disorder with single episode, in partial remission   Immunizations     Flu vaccine  Health Maintenance   Topic Date Due    BMI: Followup Plan  05/08/1991    DTaP,Tdap,and Td Vaccines (1 - Tdap) 05/08/1994    PAP SMEAR  05/08/1994    BMI: Adult  09/24/2020    CRC Screening: Colonoscopy  05/18/2027    Pneumococcal Vaccine: 65+ Years (1 of 2 - PCV13) 05/08/2038    Hepatitis C Screening  Completed    INFLUENZA VACCINE  Completed    Pneumococcal Vaccine: Pediatrics (0 to 5 Years) and At-Risk Patients (6 to 59 Years)  Aged Out    HEPATITIS B VACCINES  Aged Out     BMI Counseling: Body mass index is 33 3 kg/m²  The BMI is above normal  Nutrition recommendations include reducing portion sizes, decreasing overall calorie intake and 3-5 servings of fruits/vegetables daily  Exercise recommendations include moderate aerobic physical activity for 150 minutes/week  Assessment/Plan:    Anxiety  MARILY-7 score today 15  PHQ-9 score today 15  Pt would like to keep the same dose of paroxetine 37 5mg QD  She feels her anxiety worse because of life stress recently  Use xanax 0 5mg daily prn  SE educated pt  FU therapist Q 2 weeks as scheduled  Mixed hyperlipidemia  Low fat diet  Will check labs  Psoriasis  Stable  She is on humira per dermatology which helped  BP elevated in office today  Pt denies symptoms like headache, vision change, SOB or chest pain  DASH diet  Check BP at home 2/day and call office if BP always >140/90  Get flu shot today  Give mammogram script today  Will FU OBGYN for pap per pt  RTO in 6 months or sooner as needed  Diagnoses and all orders for this visit:    Mixed hyperlipidemia  -     CBC and differential; Future  -     Comprehensive metabolic panel; Future  -     Lipid Panel with Direct LDL reflex;  Future  -     TSH, 3rd generation with Free T4 reflex; Future    Anxiety  -     TSH, 3rd generation with Free T4 reflex; Future    Obesity (BMI 30-39  9)    Psoriasis    Need for influenza vaccination  -     influenza vaccine, 1690-1819, quadrivalent, recombinant, PF, 0 5 mL, for patients 18 yr+ (FLUBLOK)    Screening for breast cancer  -     Mammo screening bilateral w cad; Future          Subjective:      Patient ID: Power Wei is a 53 y o  female  HPI  Pt is here by herself  Hyperlipidemia---follows low fat diet  No recent labs  Anxiety---She is on paroxetine 37 5mg daily which helped  Her daughter had suicidal ideation and is in hospital now  Pt feels stressful and more anxious  Use xanax 0 5mg QD recently  FU therapist regularly which helped  Denies suicidal ideation  Psoriasis---FU Dr Michael Yanez  She is on humira which helped  Use cream as needed  Colitis---She does not use budesonide any more  She is on humira which helped  FU GI  She uses probiotics daily  No smoking  No alcohol  No drugs  The following portions of the patient's history were reviewed and updated as appropriate: allergies, current medications, past family history, past medical history, past social history, past surgical history and problem list     Review of Systems   Constitutional: Negative for appetite change, chills and fever  HENT: Negative for congestion, ear pain, sinus pain and sore throat  Eyes: Negative for discharge and itching  Respiratory: Negative for apnea, cough, chest tightness, shortness of breath and wheezing  Cardiovascular: Negative for chest pain, palpitations and leg swelling  Gastrointestinal: Negative for abdominal pain, anal bleeding, constipation, diarrhea, nausea and vomiting  Endocrine: Negative for cold intolerance, heat intolerance and polyuria  Genitourinary: Negative for difficulty urinating and dysuria  Musculoskeletal: Negative for arthralgias, back pain and myalgias  Skin: Negative for rash  Neurological: Negative for dizziness and headaches  Psychiatric/Behavioral: Negative for agitation and suicidal ideas  The patient is nervous/anxious  Objective:      /92 (BP Location: Left arm, Patient Position: Sitting, Cuff Size: Adult)   Pulse 72   Temp 98 6 °F (37 °C) (Tympanic)   Resp 16   Ht 5' 4" (1 626 m)   Wt 88 kg (194 lb)   SpO2 98%   BMI 33 30 kg/m²          Physical Exam   Constitutional: She appears well-developed  No distress  HENT:   Head: Normocephalic  Right Ear: External ear normal    Left Ear: External ear normal    Nose: Nose normal    Mouth/Throat: Oropharynx is clear and moist    Eyes: Pupils are equal, round, and reactive to light  Conjunctivae are normal  Right eye exhibits no discharge  Left eye exhibits no discharge  Neck: Normal range of motion  No thyromegaly present  Cardiovascular: Normal rate, regular rhythm and normal heart sounds  Exam reveals no gallop and no friction rub  No murmur heard  Pulmonary/Chest: Effort normal and breath sounds normal  No respiratory distress  She has no wheezes  She has no rales  She exhibits no tenderness  Abdominal: Soft  Bowel sounds are normal    Musculoskeletal: Normal range of motion  She exhibits no edema  Lymphadenopathy:     She has no cervical adenopathy  Neurological: She is alert  Psychiatric: She has a normal mood and affect

## 2019-09-24 NOTE — ASSESSMENT & PLAN NOTE
MARILY-7 score today 15  PHQ-9 score today 15  Pt would like to keep the same dose of paroxetine 37 5mg QD  She feels her anxiety worse because of life stress recently  Use xanax 0 5mg daily prn  SE educated pt  FU therapist Q 2 weeks as scheduled

## 2019-10-03 ENCOUNTER — SOCIAL WORK (OUTPATIENT)
Dept: BEHAVIORAL/MENTAL HEALTH CLINIC | Facility: CLINIC | Age: 44
End: 2019-10-03
Payer: COMMERCIAL

## 2019-10-03 DIAGNOSIS — F41.9 ANXIETY: Primary | ICD-10-CM

## 2019-10-03 DIAGNOSIS — F32.4 MAJOR DEPRESSIVE DISORDER WITH SINGLE EPISODE, IN PARTIAL REMISSION (HCC): ICD-10-CM

## 2019-10-03 PROCEDURE — 90834 PSYTX W PT 45 MINUTES: CPT | Performed by: SOCIAL WORKER

## 2019-10-03 NOTE — BH TREATMENT PLAN
Andrea Pedraza  1975       Date of Initial Treatment Plan: 7/9/18   Date of Current Treatment Plan: 10/03/19    Treatment Plan Number 5   Strengths/Personal Resources for Self Care: Good mother, caring        Diagnosis:   No diagnosis found      Area of Needs:   Depression  Family     Long Term Goal 1: Feel better     Target Date: 1/3/19  Completion Date: TBD         Short Term Objectives for Goal 1:         Use healthy coping mechanisms       Talk about how I'm feeling       Take time for myself     Long Term Goal 2: Not let them get to me       Target Date: 1/3/19  Completion Date: TBD     Short Term Objectives for Goal 2:   Set boundaries   Communicate effectively   Don't enter in drama   Use my support system     GOAL 1: Modality: Individual 1-2x per month   Completion Date TBD and The person(s) responsible for carrying out the plan is  Ronda     GOAL 2: Modality: Individual 1-2x per month   Completion Date TBD and The person(s) responsible for carrying out the plan is  [address]: Diagnosis and Treatment Plan explained to Rekha Mora relates understanding diagnosis and is agreeable to Treatment Plan         Client Comments : Please share your thoughts, feelings, need and/or experiences regarding your treatment plan: None

## 2019-10-03 NOTE — PSYCH
Treatment Plan Tracking    # 5 Treatment Plan not completed within required time limits due to: Treatment plan created verblly, Pt will sign at her next session

## 2019-10-03 NOTE — PSYCH
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

## 2019-10-18 ENCOUNTER — SOCIAL WORK (OUTPATIENT)
Dept: BEHAVIORAL/MENTAL HEALTH CLINIC | Facility: CLINIC | Age: 44
End: 2019-10-18
Payer: COMMERCIAL

## 2019-10-18 DIAGNOSIS — F32.4 MAJOR DEPRESSIVE DISORDER WITH SINGLE EPISODE, IN PARTIAL REMISSION (HCC): ICD-10-CM

## 2019-10-18 DIAGNOSIS — F41.9 ANXIETY: Primary | ICD-10-CM

## 2019-10-18 DIAGNOSIS — F41.9 ANXIETY: ICD-10-CM

## 2019-10-18 PROCEDURE — 90834 PSYTX W PT 45 MINUTES: CPT | Performed by: SOCIAL WORKER

## 2019-10-18 RX ORDER — ALPRAZOLAM 0.5 MG/1
TABLET ORAL
Qty: 30 TABLET | Refills: 0 | Status: SHIPPED | OUTPATIENT
Start: 2019-10-18 | End: 2019-11-18 | Stop reason: SDUPTHER

## 2019-10-18 NOTE — PSYCH
Psychotherapy Provided: Individual Psychotherapy 45 minutes     Length of time in session: 45 minutes, follow up in 2 week    Goals addressed in session: Goal 1 and Goal 2     Pain:      none    0    Current suicide risk : Low     D- Lenbebeto Stein stated that she and her  had gotten into a huge argument last weekend  She stated that they have continued to have difficulty with their younger daughter due to her anxiety and depression  In addition both of their daughters had also been in an argument  She stated that she disliked the way in which he reacted to her as well as their oldest daughter  Processing her emotions and discussing ways for her to continue to assert herself wit him  Also continuing to discuss increasing effective communication  Giving supportive therapy  [address] presented not feeling well with a cold  She continues to work towards completing her treatment goals  P-Continue treatment    [address]: Diagnosis and Treatment Plan explained to Terry Choi relates understanding diagnosis and is agreeable to Treatment Plan   Yes

## 2019-10-19 ENCOUNTER — OFFICE VISIT (OUTPATIENT)
Dept: URGENT CARE | Facility: MEDICAL CENTER | Age: 44
End: 2019-10-19
Payer: COMMERCIAL

## 2019-10-19 VITALS
HEIGHT: 64 IN | HEART RATE: 110 BPM | DIASTOLIC BLOOD PRESSURE: 87 MMHG | SYSTOLIC BLOOD PRESSURE: 160 MMHG | TEMPERATURE: 98.6 F | WEIGHT: 195 LBS | OXYGEN SATURATION: 96 % | BODY MASS INDEX: 33.29 KG/M2 | RESPIRATION RATE: 18 BRPM

## 2019-10-19 DIAGNOSIS — J06.9 ACUTE URI: ICD-10-CM

## 2019-10-19 DIAGNOSIS — H66.93 BILATERAL OTITIS MEDIA, UNSPECIFIED OTITIS MEDIA TYPE: Primary | ICD-10-CM

## 2019-10-19 PROCEDURE — G0383 LEV 4 HOSP TYPE B ED VISIT: HCPCS | Performed by: FAMILY MEDICINE

## 2019-10-19 RX ORDER — AMOXICILLIN AND CLAVULANATE POTASSIUM 875; 125 MG/1; MG/1
1 TABLET, FILM COATED ORAL EVERY 12 HOURS SCHEDULED
Qty: 14 TABLET | Refills: 0 | Status: SHIPPED | OUTPATIENT
Start: 2019-10-19 | End: 2019-10-26

## 2019-10-19 RX ORDER — FLUTICASONE PROPIONATE 50 MCG
2 SPRAY, SUSPENSION (ML) NASAL DAILY
Qty: 16 G | Refills: 0 | Status: SHIPPED | OUTPATIENT
Start: 2019-10-19

## 2019-10-19 NOTE — PROGRESS NOTES
Assessment/Plan    Otitis Media     -Prescribe Augmentin 875/125 BID    -Ibuprofen or Tylenol for pain    URI    - Prescribe Flonase nasal spray for Congestion     Subjective:     Patient ID: Samantha Wilson is a 53 y o  female  Reason For Visit / Chief Complaint  Chief Complaint   Patient presents with    Cold Like Symptoms     Patient c/o nasal congestion, cough, blocked ears with pain x 4-5 days          H L  Is a 52 y/o female presenting with congestion and ear pain x 5 days  Patient states that she has been having cough, sinus pressure, sneezing  She denies any drainage from the ear  Patient admits to muffled hearing and tinnitus  She has been taking Sudafed, Advil, and Nyquil which has helped slightly  Today she has had increase pain in her right ear that causes her to break into a sweat and become dizzy  Patient denies fever, sore throat, Nausea  Patient has had chills, low appetite  Past Medical History:   Diagnosis Date    Anxiety     Chronic kidney disease     R kidney stone    Colitis     Last Assessed: 5/18/2017    Depression     Diarrhea     Elevated ALT measurement     Last Assessed: 4/12/2017    Infectious mononucleosis     Irritable bowel syndrome with diarrhea     Last Assessed: 3/15/2017    Liver lesion     Last Assessed: 4/3/2017    Microscopic colitis     Migraine     not for 3 years    Mucus in stool     Last Assessed: 3/15/2017    Obesity     Psoriasis     Varicella        Past Surgical History:   Procedure Laterality Date    DENTAL SURGERY      tooth extraction    NO PAST SURGERIES      [place] COLONOSCOPY FLX DX W/COLLJ SPEC WHEN PFRMD N/A 5/18/2017    Procedure: EGD with bx AND COLONOSCOPY with bx;  Surgeon: Ángel Morgan MD;  Location: [facility];   Service: Gastroenterology       Family History   Problem Relation Age of Onset    Hypertension Mother     Hypothyroidism Mother     Hypertension Father     Stroke Paternal Grandmother         Syndrome    Cancer Paternal Grandfather        Review of Systems   Constitutional: Positive for appetite change, chills and fatigue  Negative for activity change and fever  HENT: Positive for congestion, ear pain (bilateral ear pain), hearing loss, postnasal drip, sinus pressure, sneezing and tinnitus  Negative for ear discharge, sinus pain and sore throat  Respiratory: Positive for cough  Negative for shortness of breath and wheezing  Gastrointestinal: Negative for abdominal pain, diarrhea, nausea and vomiting  Neurological: Positive for dizziness  Negative for headaches  Objective:    /87   Pulse (!) 110   Temp 98 6 °F (37 °C)   Resp 18   Ht 5' 4" (1 626 m)   Wt 88 5 kg (195 lb)   SpO2 96%   BMI 33 47 kg/m²     Physical Exam   Constitutional: She appears well-developed and well-nourished  No distress  HENT:   Right Ear: Tympanic membrane is erythematous  Left Ear: Ear canal normal  No drainage  Tympanic membrane is erythematous and bulging  Mouth/Throat: Oropharynx is clear and moist  No oropharyngeal exudate  Mild swelling in the nasal turbinates B/L  Erythematous TMs B/L, worse on the left side  Eyes: Right eye exhibits no discharge  Left eye exhibits no discharge  Cardiovascular: Normal rate, regular rhythm and normal heart sounds  No murmur heard  Pulmonary/Chest: Effort normal and breath sounds normal  No respiratory distress  She has no wheezes  Lymphadenopathy:     She has cervical adenopathy  Skin: Skin is warm and dry  No erythema  Nursing note and vitals reviewed

## 2019-10-19 NOTE — PATIENT INSTRUCTIONS
I prescribed Augmentin 875 mg twice a day for 7 days, fluticasone nasal spray-2 sprays in each nostril once daily  Otitis Media   WHAT YOU NEED TO KNOW:   Otitis media is an ear infection  DISCHARGE INSTRUCTIONS:   Medicines:  · Ibuprofen or acetaminophen  helps decrease your pain and fever  They are available without a doctor's order  Ask your healthcare provider which medicine is right for you  Ask how much to take and how often to take it  These medicines can cause stomach bleeding if not taken correctly  Ibuprofen can cause kidney damage  Do not take ibuprofen if you have kidney disease, an ulcer, or allergies to aspirin  Acetaminophen can cause liver damage  Do not drink alcohol if you take acetaminophen  · Ear drops  help treat your ear pain  · Antibiotics  help treat a bacterial infection that caused your ear infection  · Take your medicine as directed  Contact your healthcare provider if you think your medicine is not helping or if you have side effects  Tell him or her if you are allergic to any medicine  Keep a list of the medicines, vitamins, and herbs you take  Include the amounts, and when and why you take them  Bring the list or the pill bottles to follow-up visits  Carry your medicine list with you in case of an emergency  Heat or ice:   · Heat  may be used to decrease your pain  Place a warm, moist washcloth on your ear  Apply for 15 to 20 minutes, 3 to 4 times a day    · Ice  helps decrease swelling and pain  Use an ice pack or put crushed ice in a plastic bag  Cover the ice pack with a towel and place it on your ear for 15 to 20 minutes, 3 to 4 times a day for 2 days  Prevent otitis media:   · Wash your hands often  Use soap and water  Wash your hands after you use the bathroom, change a child's diapers, or sneeze  Wash your hands before you prepare or eat food  · Stay away from people who are ill  Some germs are easily and quickly spread through contact    Return to work or school: You may return to work or school when your fever is gone  Follow up with your healthcare provider as directed:  Write down your questions so you remember to ask them during your visits  Contact your healthcare provider if:   · Your ear pain gets worse or does not go away, even after treatment  · The outside of your ear is red or swollen  · You have vomiting or diarrhea  · You have fluid coming from your ear  · You have questions or concerns about your condition or care  Return to the emergency department if:   · You have a seizure  · You have a fever and a stiff neck  © 2017 [address] [facility] Information is for End User's use only and may not be sold, redistributed or otherwise used for commercial purposes  All illustrations and images included in CareNotes® are the copyrighted property of A D A M , Inc  or Isaac Perez  The above information is an  only  It is not intended as medical advice for individual conditions or treatments  Talk to your doctor, nurse or pharmacist before following any medical regimen to see if it is safe and effective for you

## 2019-10-19 NOTE — PROGRESS NOTES
[facility] [facility]        NAME: Gustabo Live is a 53 y o  female  : 1975    MRN: [id number]  DATE: 2019  TIME: 3:01 PM    Assessment and Plan   Bilateral otitis media, unspecified otitis media type [H66 [id number]]  1  Bilateral otitis media, unspecified otitis media type  amoxicillin-clavulanate (AUGMENTIN) 875-125 mg per tablet   2  Acute URI  fluticasone (FLONASE) 50 mcg/act nasal spray         Patient Instructions       Follow up with PCP in 3-5 days  Proceed to  ER if symptoms worsen  Chief Complaint     Chief Complaint   Patient presents with    Cold Like Symptoms     Patient c/o nasal congestion, cough, blocked ears with pain x 4-5 days          History of Present Illness       68-year-old female here today with complaints of bilateral earache for the past week  Left is worse than right  Describes significant pain and pressure  Also describes nasal congestion, some postnasal drip  Denies any fever  She has been taking over-the-counter medication with no noticeable improvement  Review of Systems   Review of Systems   Constitutional: Negative  HENT: Positive for congestion and ear pain  Respiratory: Positive for cough            Current Medications       Current Outpatient Medications:     ALPRAZolam (XANAX) 0 5 mg tablet, TAKE 1/2 TABLET BY MOUTH TWICE DAILY AS NEEDED, Disp: 30 tablet, Rfl: 0    amoxicillin-clavulanate (AUGMENTIN) 875-125 mg per tablet, Take 1 tablet by mouth every 12 (twelve) hours for 7 days, Disp: 14 tablet, Rfl: 0    clobetasol (TEMOVATE) 0 05 % external solution, APPLY TO SCALP TWICE DAILY, Disp: , Rfl: 2    fluticasone (FLONASE) 50 mcg/act nasal spray, 2 sprays into each nostril daily, Disp: 16 g, Rfl: 0    HUMIRA PEN-PSORIASIS STARTER 40 MG/0 8ML PNKT, 40 mg every 14 (fourteen) days  , Disp: , Rfl:     PARoxetine (PAXIL-CR) 37 5 mg 24 hr tablet, TAKE 1 TABLET (37 5 MG TOTAL) BY MOUTH EVERY MORNING, Disp: 90 tablet, Rfl: 1    Probiotic Product (PROBIOTIC-10) CAPS, Take 2 capsules by mouth daily, Disp: , Rfl:     triamcinolone (KENALOG) 0 1 % cream, APPLY TWICE A DAY, Disp: , Rfl: 3    Current Allergies     Allergies as of 10/19/2019    (No Known Allergies)            The following portions of the patient's history were reviewed and updated as appropriate: allergies, current medications, past family history, past medical history, past social history, past surgical history and problem list      Past Medical History:   Diagnosis Date    Anxiety     Chronic kidney disease     R kidney stone    Colitis     Last Assessed: 5/18/2017    Depression     Diarrhea     Elevated ALT measurement     Last Assessed: 4/12/2017    Infectious mononucleosis     Irritable bowel syndrome with diarrhea     Last Assessed: 3/15/2017    Liver lesion     Last Assessed: 4/3/2017    Microscopic colitis     Migraine     not for 3 years    Mucus in stool     Last Assessed: 3/15/2017    Obesity     Psoriasis     Varicella        Past Surgical History:   Procedure Laterality Date    DENTAL SURGERY      tooth extraction    NO PAST SURGERIES      [place] COLONOSCOPY FLX DX W/COLLJ SPEC WHEN PFRMD N/A 5/18/2017    Procedure: EGD with bx AND COLONOSCOPY with bx;  Surgeon: Ferny Ibarra MD;  Location: [facility]; Service: Gastroenterology       Family History   Problem Relation Age of Onset    Hypertension Mother     Hypothyroidism Mother     Hypertension Father     Stroke Paternal Grandmother         Syndrome    Cancer Paternal Grandfather          Medications have been verified  Objective   /87   Pulse (!) 110   Temp 98 6 °F (37 °C)   Resp 18   Ht 5' 4" (1 626 m)   Wt 88 5 kg (195 lb)   SpO2 96%   BMI 33 47 kg/m²        Physical Exam     Physical Exam   HENT:   Both right and left tympanic membrane are erythematous dull bulging left greater than right  Left tympanic membrane reveals exudate  Hypertrophic turbinates right greater than left  Cardiovascular: Normal rate, regular rhythm and normal heart sounds  Pulmonary/Chest: Effort normal and breath sounds normal    Lymphadenopathy:     She has cervical adenopathy  Vitals reviewed

## 2019-10-31 ENCOUNTER — OFFICE VISIT (OUTPATIENT)
Dept: RHEUMATOLOGY | Facility: CLINIC | Age: 44
End: 2019-10-31
Payer: COMMERCIAL

## 2019-10-31 VITALS
HEART RATE: 88 BPM | BODY MASS INDEX: 33.29 KG/M2 | DIASTOLIC BLOOD PRESSURE: 76 MMHG | WEIGHT: 195 LBS | HEIGHT: 64 IN | SYSTOLIC BLOOD PRESSURE: 132 MMHG

## 2019-10-31 DIAGNOSIS — L40.9 PSORIASIS: ICD-10-CM

## 2019-10-31 DIAGNOSIS — M25.50 POLYARTHRALGIA: Primary | ICD-10-CM

## 2019-10-31 DIAGNOSIS — M79.18 MYOFASCIAL PAIN: ICD-10-CM

## 2019-10-31 DIAGNOSIS — Z79.899 LONG TERM USE OF DRUG: ICD-10-CM

## 2019-10-31 DIAGNOSIS — G89.29 CHRONIC BILATERAL LOW BACK PAIN WITHOUT SCIATICA: ICD-10-CM

## 2019-10-31 DIAGNOSIS — M54.50 CHRONIC BILATERAL LOW BACK PAIN WITHOUT SCIATICA: ICD-10-CM

## 2019-10-31 PROCEDURE — 99204 OFFICE O/P NEW MOD 45 MIN: CPT | Performed by: INTERNAL MEDICINE

## 2019-10-31 NOTE — PROGRESS NOTES
Assessment and Plan:   Patient is a 57-year-old female with longstanding psoriasis currently well managed with Humira, who presents for rheumatology evaluation regarding her joint pain  She has chronic joint pain but feels it has worsened in the past 1 year with intermittent flares of her joint pain  Her issues are mainly in her fingers, hip/lower back and knees  She started Humira about 1 year ago for skin psoriasis and feels that it helped a little bit with her joint pain, but continues to have joint pain despite Humira  Discussion with her does not reveal typical symptoms of inflammatory arthritis such as psoriatic arthritis  She lacks morning stiffness and persistent joint swelling which are Hallmark features  Her exam today also does not reveal any evidence of synovitis, dactylitis or enthesitis and clinical suspicion is currently low for psoriatic arthritis  I discussed with her it is challenging to evaluate for this on treatment with Humira since Humira could potentially be suppressing joint inflammation  None the less because there is no active inflammatory arthritis I would not recommend changing the Humira or adding additional therapy at this time  Her exam also had some generalized myofascial pain and we also discussed this today in the office that this could be the main reason for her pain rather than inflammatory arthritis  We did discuss briefly there treatment options such as gabapentin but she would like to hold off at this time  We will get an x-ray points to assess for degenerative changes that could be contributing to her lower back pain and leg pain  Otherwise she had negative rheumatologic workup over the past 1 year and I do not think this needs repeating or additional workup at this time    She will follow up with me in about 6 months to reassess, but we did discuss typical signs and symptoms of psoriatic arthritis such as persistent joint swelling that she should come in sooner if these develop  Plan:  Diagnoses and all orders for this visit:    Polyarthralgia    Psoriasis    Long term use of drug    Chronic bilateral low back pain without sciatica  -     XR spine lumbar minimum 4 views non injury; Future  -     XR sacroiliac joints 3+ views; Future    Myofascial pain        Follow-up plan: 6 months       HPI  Yesi Jose is a 53 y o   female with anxiety, depression, hyperlipidemia, history of migraines, psoriasis on Humira, microscopic colitis who presents for rheumatology evaluation regarding joint pain in the setting of psoriasis  She follows with Dermatology Dr Jillian Chopra and is on treatment for her psoriasis with Humira  She previously tried topical steroids for many years, she has had psoriasis since age 13  She has not tried other biologics or other DMARDs besides the Humira  The Humira has worked very well for her and has cleared up majority of her skin lesions, which are predominantly on her distal legs  Joint pain is mainly in the fingers, hips, and knees  Sometimes in her feet if she walks a lot  Joint pain started within past few years, but worse in the last 1 year  She started Humira about 1 year ago and does report that at times she feels it has helped some of her joint pain and feels that the flares of her joint pain are less frequent on Humira  However she continues to have joint pain despite Humira  Her knees are stiff in the morning for 1 minute and resolves after walking around  Her fingers are also worse with activity  Her left knee sometimes swell and her "whole hands" sometimes swell  She saw orthopedics for her knee pain and they did not find any abnormality on their end  Her knee x-rays are normal   She also has bilateral lower back pain and sometimes feels pain going into her legs  She also has point tenderness over her left lateral knee in feels like this radiates up her thigh into her back sometimes    She takes tylenol as needed which is about twice per week, no longer uses ibuprofen due to colitis diagnosis  She has history of microscopic colitis appreciated on colonoscopy and was on budesonide, but she was able to discontinue this in April 2019 and has not had recurrence  She had colonoscopy in 2017 that showed mild colitis in her descending and sigmoid colon  She does follow with GI  She also reports the colitis improved on humira and there was question of whether or not she also had UC  Denies photosensitivity, sicca symptoms, oral or nasal ulcers, alopecia, Raynaud's, h/o pericarditis or pleurisy, h/o blood clots or miscarriages  Review of Systems  Review of Systems   Constitutional: Negative for chills, fatigue, fever and unexpected weight change  HENT: Negative for mouth sores and trouble swallowing  Eyes: Negative for pain and visual disturbance  Respiratory: Negative for cough and shortness of breath  Cardiovascular: Negative for chest pain and leg swelling  Gastrointestinal: Negative for abdominal pain, blood in stool, constipation, diarrhea and nausea  Genitourinary: Negative for hematuria  Musculoskeletal: Positive for arthralgias, back pain and myalgias  Negative for joint swelling  Skin: Positive for rash (psoriasis)  Neurological: Negative for weakness and numbness  Hematological: Negative for adenopathy  Psychiatric/Behavioral: Negative for sleep disturbance         Allergies  No Known Allergies    Home Medications    Current Outpatient Medications:     ALPRAZolam (XANAX) 0 5 mg tablet, TAKE 1/2 TABLET BY MOUTH TWICE DAILY AS NEEDED, Disp: 30 tablet, Rfl: 0    clobetasol (TEMOVATE) 0 05 % external solution, APPLY TO SCALP TWICE DAILY, Disp: , Rfl: 2    fluticasone (FLONASE) 50 mcg/act nasal spray, 2 sprays into each nostril daily, Disp: 16 g, Rfl: 0    HUMIRA PEN-PSORIASIS STARTER 40 MG/0 8ML PNKT, 40 mg every 14 (fourteen) days  , Disp: , Rfl:     PARoxetine (PAXIL-CR) 37 5 mg 24 hr tablet, TAKE 1 TABLET (37 5 MG TOTAL) BY MOUTH EVERY MORNING, Disp: 90 tablet, Rfl: 1    Probiotic Product (PROBIOTIC-10) CAPS, Take 2 capsules by mouth daily, Disp: , Rfl:     triamcinolone (KENALOG) 0 1 % cream, APPLY TWICE A DAY, Disp: , Rfl: 3    Past Medical History  Past Medical History:   Diagnosis Date    Anxiety     Chronic kidney disease     R kidney stone    Colitis     Last Assessed: 5/18/2017    Depression     Diarrhea     Elevated ALT measurement     Last Assessed: 4/12/2017    Infectious mononucleosis     Irritable bowel syndrome with diarrhea     Last Assessed: 3/15/2017    Liver lesion     Last Assessed: 4/3/2017    Microscopic colitis     Migraine     not for 3 years    Mucus in stool     Last Assessed: 3/15/2017    Obesity     Psoriasis     Varicella        Past Surgical History   Past Surgical History:   Procedure Laterality Date    DENTAL SURGERY      tooth extraction    NO PAST SURGERIES      AZ COLONOSCOPY FLX DX W/COLLJ SPEC WHEN PFRMD N/A 5/18/2017    Procedure: EGD with bx AND COLONOSCOPY with bx;  Surgeon: Minor Mena MD;  Location: [facility]; Service: Gastroenterology       Family History  No known family history of autoimmune or inflammatory diseases      Family History   Problem Relation Age of Onset    Hypertension Mother     Hypothyroidism Mother     Hypertension Father     Stroke Paternal Grandmother         Syndrome    Cancer Paternal Grandfather        Social History  Occupation:  at [address] History     Substance and Sexual Activity   Alcohol Use Yes    Alcohol/week: 1 0 standard drinks    Types: 1 Glasses of wine per week    Frequency: Monthly or less    Drinks per session: 1 or 2    Binge frequency: Never    Comment: monthly     Social History     Substance and Sexual Activity   Drug Use No     Social History     Tobacco Use   Smoking Status Former Smoker    Types: Cigarettes    Last attempt to quit: 5/22/2014    Years since quitting: 5 4   Smokeless Tobacco Never Used       Objective:  Vitals:    10/31/19 1002   BP: 132/76   Pulse: 88   Weight: 88 5 kg (195 lb)   Height: 5' 4" (1 626 m)       Physical Exam   Constitutional: She appears well-developed and well-nourished  She is cooperative  No distress  HENT:   Head: Normocephalic and atraumatic  Mouth/Throat: Oropharynx is clear and moist and mucous membranes are normal    Eyes: Conjunctivae and EOM are normal  No scleral icterus  Neck: Neck supple  No spinous process tenderness and no muscular tenderness present  No thyromegaly present  Cardiovascular: Normal rate, regular rhythm, S1 normal and S2 normal  Exam reveals no friction rub  No murmur heard  Pulmonary/Chest: Breath sounds normal  No respiratory distress  She has no wheezes  She has no rhonchi  She has no rales  Musculoskeletal:   No joint swelling or synovitis anywhere  Myofascial tenderness throughout the upper back, anterior chest, lateral elbows, lateral hips and knees  Bilateral knee tenderness medially and laterally  Lymphadenopathy:     She has no cervical adenopathy  Neurological: She is alert  No sensory deficit  Skin: Skin is warm and dry  No rash noted  Nails show no clubbing  Faint resolving psoriasis on the distal legs   Psychiatric: She has a normal mood and affect         Imaging:   B/L knee XRs 4/22/19:  Normal    Labs:   Component      Latest Ref Rng & Units 4/30/2019   WBC      4 31 - 10 16 Thousand/uL 7 74   Red Blood Cell Count      3 81 - 5 12 Million/uL 4 74   Hemoglobin      11 5 - 15 4 g/dL 13 9   HCT      34 8 - 46 1 % 41 5   MCV      82 - 98 fL 88   MCH      26 8 - 34 3 pg 29 3   MCHC      31 4 - 37 4 g/dL 33 5   RDW      11 6 - 15 1 % 12 4   MPV      8 9 - 12 7 fL 9 7   Platelet Count      287 - 390 Thousands/uL 310   nRBC      /100 WBCs 0   Neutrophils %      43 - 75 % 47   Immat GRANS %      0 - 2 % 0   Lymphocytes Relative      14 - 44 % 38   Monocytes Relative      4 - 12 % 9 Eosinophils      0 - 6 % 5   Basophils Relative      0 - 1 % 1   Absolute Neutrophils      1 85 - 7 62 Thousands/µL 3 63   Immature Grans Absolute      0 00 - 0 20 Thousand/uL 0 02   Lymphocytes Absolute      0 60 - 4 47 Thousands/µL 2 93   Absolute Monocytes      0 17 - 1 22 Thousand/µL 0 66   Absolute Eosinophils      0 00 - 0 61 Thousand/µL 0 39   Basophils Absolute      0 00 - 0 10 Thousands/µL 0 11 (H)   Sodium      136 - 145 mmol/L 134 (L)   Potassium      3 5 - 5 3 mmol/L 4 0   Chloride      100 - 108 mmol/L 103   CO2      21 - 32 mmol/L 27   Anion Gap      4 - 13 mmol/L 4   BUN      5 - 25 mg/dL 9   Creatinine      0 60 - 1 30 mg/dL 0 84   Glucose, Random      65 - 140 mg/dL 97   Calcium      8 3 - 10 1 mg/dL 8 5   AST      5 - 45 U/L 20   ALT      12 - 78 U/L 35   Alkaline Phosphatase      46 - 116 U/L 58   Total Protein      6 4 - 8 2 g/dL 7 7   Albumin      3 5 - 5 0 g/dL 3 9   TOTAL BILIRUBIN      0 20 - 1 00 mg/dL 0 52   eGFR      ml/min/1 73sq m 84   C-REACTIVE PROTEIN      <3 0 mg/L <3 0   Sed Rate      0 - 20 mm/hour 12   CYCLIC CITRULLINATED PEPTIDE ANTIBODY      0 - 19 units 6   RHEUMATOID FACTOR      Negative Negative     Component      Latest Ref Rng & Units 7/31/2018   WBC      4 31 - 10 16 Thousand/uL 10 53 (H)   Red Blood Cell Count      3 81 - 5 12 Million/uL 5 06   Hemoglobin      11 5 - 15 4 g/dL 14 8   HCT      34 8 - 46 1 % 45 2   MCV      82 - 98 fL 89   MCH      26 8 - 34 3 pg 29 2   MCHC      31 4 - 37 4 g/dL 32 7   RDW      11 6 - 15 1 % 12 6   MPV      8 9 - 12 7 fL 9 9   Platelet Count      317 - 390 Thousands/uL 307   nRBC      /100 WBCs 0   Neutrophils %      43 - 75 % 64   Immat GRANS %      0 - 2 % 1   Lymphocytes Relative      14 - 44 % 24   Monocytes Relative      4 - 12 % 8   Eosinophils      0 - 6 % 2   Basophils Relative      0 - 1 % 1   Absolute Neutrophils      1 85 - 7 62 Thousands/µL 6 87   Immature Grans Absolute      0 00 - 0 20 Thousand/uL 0 06   Lymphocytes Absolute      0 60 - 4 47 Thousands/µL 2 53   Absolute Monocytes      0 17 - 1 22 Thousand/µL 0 79   Absolute Eosinophils      0 00 - 0 61 Thousand/µL 0 19   Basophils Absolute      0 00 - 0 10 Thousands/µL 0 09   Sodium      136 - 145 mmol/L 135 (L)   Potassium      3 5 - 5 3 mmol/L 4 7   Chloride      100 - 108 mmol/L 103   CO2      21 - 32 mmol/L 26   Anion Gap      4 - 13 mmol/L 6   BUN      5 - 25 mg/dL 10   Creatinine      0 60 - 1 30 mg/dL 0 80   GLUCOSE FASTING      65 - 99 mg/dL 93   Calcium      8 3 - 10 1 mg/dL 9 4   AST      5 - 45 U/L 19   ALT      12 - 78 U/L 36   Alkaline Phosphatase      46 - 116 U/L 60   Total Protein      6 4 - 8 2 g/dL 8 0   Albumin      3 5 - 5 0 g/dL 4 1   TOTAL BILIRUBIN      0 20 - 1 00 mg/dL 0 79   eGFR      ml/min/1 73sq m 89   LYME AB IGG      0 00 - 0 79 0 24   LYME AB IGM      0 00 - 0 79 0 34   TSH 3RD GENERATON      0 358 - 3 740 uIU/mL 0 925   Folate      3 1 - 17 5 ng/mL 17 1   Vitamin B-12      100 - 900 pg/mL 225   Sed Rate      0 - 20 mm/hour 13   C-REACTIVE PROTEIN      <3 0 mg/L <3 0   RHEUMATOID FACTOR      Negative Negative

## 2019-11-10 DIAGNOSIS — J06.9 ACUTE URI: ICD-10-CM

## 2019-11-11 RX ORDER — FLUTICASONE PROPIONATE 50 MCG
SPRAY, SUSPENSION (ML) NASAL
Qty: 16 ML | Refills: 0 | OUTPATIENT
Start: 2019-11-11

## 2019-11-18 DIAGNOSIS — F41.9 ANXIETY: ICD-10-CM

## 2019-11-19 RX ORDER — ALPRAZOLAM 0.5 MG/1
TABLET ORAL
Qty: 30 TABLET | Refills: 0 | Status: SHIPPED | OUTPATIENT
Start: 2019-11-19 | End: 2020-01-03

## 2019-11-19 NOTE — PSYCH
Psychotherapy Provided: Individual Psychotherapy 45 minutes     Length of time in session: 45 minutes, follow up in 2 week    Goals addressed in session: Goal 2     Pain:      none    0    Current suicide risk : Low     DDaniel Bond stated that she is struggling with her relationships with her family  She shared that she had a difficult time towards the end of her mother's visit due to her controlling and condescending behavior  She also stated that her  has been distant and irritable with her  She stated that he refuses to discuss things with her  She also stated that her oldest daughter has been giving her a difficult time  Processing her emotions and discussing ways for her to set and maintain healthy boundaries with her family members  Also discussing not accepting bad behavior from others  Discussing ways for her to continue to practice self care  Giving supportive therapy  A- Progress - Continuing to process her emotions  P-Continue treatment    [address]: Diagnosis and Treatment Plan explained to Mery Dixon relates understanding diagnosis and is agreeable to Treatment Plan   Yes no device/independent

## 2019-12-06 ENCOUNTER — SOCIAL WORK (OUTPATIENT)
Dept: BEHAVIORAL/MENTAL HEALTH CLINIC | Facility: CLINIC | Age: 44
End: 2019-12-06
Payer: COMMERCIAL

## 2019-12-06 DIAGNOSIS — F32.4 MAJOR DEPRESSIVE DISORDER WITH SINGLE EPISODE, IN PARTIAL REMISSION (HCC): ICD-10-CM

## 2019-12-06 DIAGNOSIS — F41.9 ANXIETY: Primary | ICD-10-CM

## 2019-12-06 PROCEDURE — 90834 PSYTX W PT 45 MINUTES: CPT | Performed by: SOCIAL WORKER

## 2019-12-06 NOTE — PSYCH
Psychotherapy Provided: Individual Psychotherapy 45 minutes     Length of time in session: 45 minutes, follow up in 2 week    Goals addressed in session: Goal 1 and Goal 2     Pain:      none    0    Current suicide risk : Low     D- Amy Neff stated that she won the election and is now on the commission  She stated that she won by one vote  She stated that she is also enjoying her job at [employer] and feels appreciated there  Discussing the ongoing issues wit her family including her daughter's decision to move back home from college  She also stated that her  continues to make issues with their daughter more difficult due to his negative responses towards her  Discussing ways for her to be able to communicate her feelings effectively with her   Also continuing to discuss setting and maintaining boundaries with her family members  Jannet 18 presented as pleasant but somewhat tearful  She continues to work towards completing her treatment goals  P-Continue treatment    [address]: Diagnosis and Treatment Plan explained to Andreas Cole relates understanding diagnosis and is agreeable to Treatment Plan   Yes

## 2019-12-20 ENCOUNTER — SOCIAL WORK (OUTPATIENT)
Dept: BEHAVIORAL/MENTAL HEALTH CLINIC | Facility: CLINIC | Age: 44
End: 2019-12-20
Payer: COMMERCIAL

## 2019-12-20 DIAGNOSIS — F32.4 MAJOR DEPRESSIVE DISORDER WITH SINGLE EPISODE, IN PARTIAL REMISSION (HCC): Primary | ICD-10-CM

## 2019-12-20 PROCEDURE — 90834 PSYTX W PT 45 MINUTES: CPT | Performed by: SOCIAL WORKER
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
## 2020-01-03 DIAGNOSIS — F32.4 MAJOR DEPRESSIVE DISORDER WITH SINGLE EPISODE, IN PARTIAL REMISSION (HCC): ICD-10-CM

## 2020-01-03 DIAGNOSIS — F41.9 ANXIETY: ICD-10-CM

## 2020-01-03 RX ORDER — ALPRAZOLAM 0.5 MG/1
TABLET ORAL
Qty: 30 TABLET | Refills: 0 | Status: SHIPPED | OUTPATIENT
Start: 2020-01-03 | End: 2020-02-11

## 2020-01-03 RX ORDER — PAROXETINE HYDROCHLORIDE 37.5 MG/1
37.5 TABLET, FILM COATED, EXTENDED RELEASE ORAL EVERY MORNING
Qty: 90 TABLET | Refills: 1 | Status: SHIPPED | OUTPATIENT
Start: 2020-01-03 | End: 2020-04-10 | Stop reason: SDUPTHER

## 2020-02-11 DIAGNOSIS — F41.9 ANXIETY: ICD-10-CM

## 2020-02-11 RX ORDER — ALPRAZOLAM 0.5 MG/1
TABLET ORAL
Qty: 30 TABLET | Refills: 0 | Status: SHIPPED | OUTPATIENT
Start: 2020-02-11 | End: 2020-03-11

## 2020-02-14 ENCOUNTER — SOCIAL WORK (OUTPATIENT)
Dept: BEHAVIORAL/MENTAL HEALTH CLINIC | Facility: CLINIC | Age: 45
End: 2020-02-14
Payer: COMMERCIAL

## 2020-02-14 DIAGNOSIS — F32.4 MAJOR DEPRESSIVE DISORDER WITH SINGLE EPISODE, IN PARTIAL REMISSION (HCC): ICD-10-CM

## 2020-02-14 DIAGNOSIS — F41.9 ANXIETY: Primary | ICD-10-CM

## 2020-02-14 PROCEDURE — 1036F TOBACCO NON-USER: CPT | Performed by: SOCIAL WORKER

## 2020-02-14 PROCEDURE — 90834 PSYTX W PT 45 MINUTES: CPT | Performed by: SOCIAL WORKER

## 2020-02-14 NOTE — PSYCH
Psychotherapy Provided: Individual Psychotherapy 45 minutes     Length of time in session: 45 minutes, follow up in 2 week    Goals addressed in session: Goal 1 and Goal 2     Pain:      none    0    Current suicide risk : Low     D- bret stated that she continues to work and is enjoying her job  She stated that she feels that she is appreciated there  She discussed he ongoing issues with her oldest daughter moving back into the home and the difficulty between her and her father  Discussing maintaining boundaries and having them resolve their own issues rather than allowing them to pull her into each situation  Discussing ways for her to continue to assert herself and not accept abusive behavior  Giving supportive therapy  A- Progress- Continuing to work towards completing her treatment goals  P-Continue treatment    [address]: Diagnosis and Treatment Plan explained to Orestes Chapin relates understanding diagnosis and is agreeable to Treatment Plan   Yes

## 2020-02-25 ENCOUNTER — TELEPHONE (OUTPATIENT)
Dept: GASTROENTEROLOGY | Facility: CLINIC | Age: 45
End: 2020-02-25

## 2020-03-11 DIAGNOSIS — F41.9 ANXIETY: ICD-10-CM

## 2020-03-11 RX ORDER — ALPRAZOLAM 0.5 MG/1
TABLET ORAL
Qty: 30 TABLET | Refills: 0 | Status: SHIPPED | OUTPATIENT
Start: 2020-03-11 | End: 2020-04-10 | Stop reason: SDUPTHER

## 2020-04-09 ENCOUNTER — TELEMEDICINE (OUTPATIENT)
Dept: BEHAVIORAL/MENTAL HEALTH CLINIC | Facility: CLINIC | Age: 45
End: 2020-04-09
Payer: COMMERCIAL

## 2020-04-09 DIAGNOSIS — F41.9 ANXIETY: Primary | ICD-10-CM

## 2020-04-09 DIAGNOSIS — F32.4 MAJOR DEPRESSIVE DISORDER WITH SINGLE EPISODE, IN PARTIAL REMISSION (HCC): ICD-10-CM

## 2020-04-09 PROCEDURE — 90834 PSYTX W PT 45 MINUTES: CPT | Performed by: SOCIAL WORKER

## 2020-04-10 ENCOUNTER — TELEMEDICINE (OUTPATIENT)
Dept: FAMILY MEDICINE CLINIC | Facility: CLINIC | Age: 45
End: 2020-04-10
Payer: COMMERCIAL

## 2020-04-10 VITALS — BODY MASS INDEX: 33.29 KG/M2 | TEMPERATURE: 97.7 F | WEIGHT: 195 LBS | HEIGHT: 64 IN

## 2020-04-10 DIAGNOSIS — G47.00 INSOMNIA, UNSPECIFIED TYPE: Primary | ICD-10-CM

## 2020-04-10 DIAGNOSIS — F41.8 ANXIETY WITH DEPRESSION: ICD-10-CM

## 2020-04-10 PROCEDURE — 99214 OFFICE O/P EST MOD 30 MIN: CPT | Performed by: FAMILY MEDICINE

## 2020-04-10 PROCEDURE — 3008F BODY MASS INDEX DOCD: CPT | Performed by: PHYSICIAN ASSISTANT

## 2020-04-10 RX ORDER — PAROXETINE HYDROCHLORIDE 37.5 MG/1
37.5 TABLET, FILM COATED, EXTENDED RELEASE ORAL EVERY MORNING
Qty: 90 TABLET | Refills: 1 | Status: SHIPPED | OUTPATIENT
Start: 2020-04-10 | End: 2020-11-17

## 2020-04-10 RX ORDER — PAROXETINE HYDROCHLORIDE 12.5 MG/1
12.5 TABLET, FILM COATED, EXTENDED RELEASE ORAL EVERY MORNING
Qty: 90 TABLET | Refills: 1 | Status: SHIPPED | OUTPATIENT
Start: 2020-04-10 | End: 2020-09-02 | Stop reason: ALTCHOICE

## 2020-04-10 RX ORDER — ALPRAZOLAM 0.5 MG/1
0.5 TABLET ORAL 2 TIMES DAILY PRN
Qty: 60 TABLET | Refills: 0 | Status: SHIPPED | OUTPATIENT
Start: 2020-04-10 | End: 2020-05-14

## 2020-05-04 ENCOUNTER — TELEPHONE (OUTPATIENT)
Dept: GASTROENTEROLOGY | Facility: MEDICAL CENTER | Age: 45
End: 2020-05-04

## 2020-05-04 ENCOUNTER — TELEMEDICINE (OUTPATIENT)
Dept: GASTROENTEROLOGY | Facility: MEDICAL CENTER | Age: 45
End: 2020-05-04
Payer: COMMERCIAL

## 2020-05-04 DIAGNOSIS — K52.839 MICROSCOPIC COLITIS, UNSPECIFIED MICROSCOPIC COLITIS TYPE: Primary | ICD-10-CM

## 2020-05-04 DIAGNOSIS — K58.0 IRRITABLE BOWEL SYNDROME WITH DIARRHEA: ICD-10-CM

## 2020-05-04 PROCEDURE — 99213 OFFICE O/P EST LOW 20 MIN: CPT | Performed by: PHYSICIAN ASSISTANT

## 2020-05-08 ENCOUNTER — TELEPHONE (OUTPATIENT)
Dept: BEHAVIORAL/MENTAL HEALTH CLINIC | Facility: CLINIC | Age: 45
End: 2020-05-08

## 2020-05-14 DIAGNOSIS — F41.8 ANXIETY WITH DEPRESSION: ICD-10-CM

## 2020-05-14 RX ORDER — ALPRAZOLAM 0.5 MG/1
0.5 TABLET ORAL 2 TIMES DAILY PRN
Qty: 60 TABLET | Refills: 0 | Status: SHIPPED | OUTPATIENT
Start: 2020-05-14 | End: 2020-06-17

## 2020-06-05 ENCOUNTER — TELEMEDICINE (OUTPATIENT)
Dept: BEHAVIORAL/MENTAL HEALTH CLINIC | Facility: CLINIC | Age: 45
End: 2020-06-05
Payer: COMMERCIAL

## 2020-06-05 DIAGNOSIS — F32.4 MAJOR DEPRESSIVE DISORDER WITH SINGLE EPISODE, IN PARTIAL REMISSION (HCC): Primary | ICD-10-CM

## 2020-06-05 PROCEDURE — 90834 PSYTX W PT 45 MINUTES: CPT | Performed by: SOCIAL WORKER

## 2020-06-16 DIAGNOSIS — F41.8 ANXIETY WITH DEPRESSION: ICD-10-CM

## 2020-06-17 RX ORDER — ALPRAZOLAM 0.5 MG/1
0.5 TABLET ORAL 2 TIMES DAILY PRN
Qty: 60 TABLET | Refills: 0 | Status: SHIPPED | OUTPATIENT
Start: 2020-06-17 | End: 2020-07-20

## 2020-07-19 DIAGNOSIS — F41.8 ANXIETY WITH DEPRESSION: ICD-10-CM

## 2020-07-20 RX ORDER — ALPRAZOLAM 0.5 MG/1
0.5 TABLET ORAL 2 TIMES DAILY PRN
Qty: 60 TABLET | Refills: 0 | Status: SHIPPED | OUTPATIENT
Start: 2020-07-20 | End: 2020-08-31

## 2020-08-07 ENCOUNTER — TELEPHONE (OUTPATIENT)
Dept: BEHAVIORAL/MENTAL HEALTH CLINIC | Facility: CLINIC | Age: 45
End: 2020-08-07

## 2020-08-28 ENCOUNTER — TELEPHONE (OUTPATIENT)
Dept: FAMILY MEDICINE CLINIC | Facility: CLINIC | Age: 45
End: 2020-08-28

## 2020-08-28 DIAGNOSIS — F41.8 ANXIETY WITH DEPRESSION: ICD-10-CM

## 2020-08-28 NOTE — TELEPHONE ENCOUNTER
Patient is experiencing a tingling/burning sensation down her back when she moves a certain way and is worse when she coughs  Also, she has pain in her left hip, knee, foot and ankle for several months  There are no appointments for today and not sure if this can wait until next week  Please call patient at [phone]

## 2020-08-31 RX ORDER — ALPRAZOLAM 0.5 MG/1
0.5 TABLET ORAL 2 TIMES DAILY PRN
Qty: 60 TABLET | Refills: 0 | Status: SHIPPED | OUTPATIENT
Start: 2020-08-31 | End: 2020-10-15

## 2020-09-02 ENCOUNTER — OFFICE VISIT (OUTPATIENT)
Dept: FAMILY MEDICINE CLINIC | Facility: CLINIC | Age: 45
End: 2020-09-02
Payer: COMMERCIAL

## 2020-09-02 VITALS
OXYGEN SATURATION: 98 % | BODY MASS INDEX: 32.95 KG/M2 | RESPIRATION RATE: 16 BRPM | DIASTOLIC BLOOD PRESSURE: 88 MMHG | HEIGHT: 64 IN | SYSTOLIC BLOOD PRESSURE: 128 MMHG | HEART RATE: 72 BPM | WEIGHT: 193 LBS | TEMPERATURE: 97.8 F

## 2020-09-02 DIAGNOSIS — M54.12 CERVICAL RADICULOPATHY: ICD-10-CM

## 2020-09-02 DIAGNOSIS — M79.605 LUMBAR PAIN WITH RADIATION DOWN LEFT LEG: Primary | ICD-10-CM

## 2020-09-02 DIAGNOSIS — M54.50 LUMBAR PAIN WITH RADIATION DOWN LEFT LEG: Primary | ICD-10-CM

## 2020-09-02 PROCEDURE — 99214 OFFICE O/P EST MOD 30 MIN: CPT | Performed by: FAMILY MEDICINE

## 2020-09-02 PROCEDURE — 1036F TOBACCO NON-USER: CPT | Performed by: FAMILY MEDICINE

## 2020-09-02 PROCEDURE — 3725F SCREEN DEPRESSION PERFORMED: CPT | Performed by: FAMILY MEDICINE

## 2020-09-02 RX ORDER — PREDNISONE 10 MG/1
TABLET ORAL
Qty: 28 TABLET | Refills: 0 | Status: SHIPPED | OUTPATIENT
Start: 2020-09-02 | End: 2021-04-08 | Stop reason: ALTCHOICE
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
## 2020-09-04 ENCOUNTER — HOSPITAL ENCOUNTER (OUTPATIENT)
Dept: RADIOLOGY | Facility: HOSPITAL | Age: 45
Discharge: HOME | End: 2020-09-04
Payer: COMMERCIAL

## 2020-09-04 ENCOUNTER — TRANSCRIBE ORDERS (OUTPATIENT)
Dept: RADIOLOGY | Facility: HOSPITAL | Age: 45
End: 2020-09-04

## 2020-09-04 ENCOUNTER — APPOINTMENT (OUTPATIENT)
Dept: LAB | Facility: HOSPITAL | Age: 45
End: 2020-09-04
Payer: COMMERCIAL

## 2020-09-04 DIAGNOSIS — M54.50 LUMBAR PAIN WITH RADIATION DOWN LEFT LEG: ICD-10-CM

## 2020-09-04 DIAGNOSIS — F41.9 ANXIETY: ICD-10-CM

## 2020-09-04 DIAGNOSIS — M54.12 CERVICAL RADICULOPATHY: ICD-10-CM

## 2020-09-04 DIAGNOSIS — M79.605 LUMBAR PAIN WITH RADIATION DOWN LEFT LEG: ICD-10-CM

## 2020-09-04 DIAGNOSIS — E78.2 MIXED HYPERLIPIDEMIA: ICD-10-CM

## 2020-09-04 LAB
25(OH)D3 SERPL-MCNC: 16.2 NG/ML (ref 30–100)
ALBUMIN SERPL BCP-MCNC: 4 G/DL (ref 3.5–5)
ALP SERPL-CCNC: 56 U/L (ref 46–116)
ALT SERPL W P-5'-P-CCNC: 26 U/L (ref 12–78)
ANION GAP SERPL CALCULATED.3IONS-SCNC: 5 MMOL/L (ref 4–13)
AST SERPL W P-5'-P-CCNC: 14 U/L (ref 5–45)
BASOPHILS # BLD AUTO: 0.11 THOUSANDS/ΜL (ref 0–0.1)
BASOPHILS NFR BLD AUTO: 1 % (ref 0–1)
BILIRUB SERPL-MCNC: 0.7 MG/DL (ref 0.2–1)
BUN SERPL-MCNC: 15 MG/DL (ref 5–25)
CALCIUM SERPL-MCNC: 8.8 MG/DL (ref 8.3–10.1)
CHLORIDE SERPL-SCNC: 107 MMOL/L (ref 100–108)
CHOLEST SERPL-MCNC: 234 MG/DL (ref 50–200)
CO2 SERPL-SCNC: 25 MMOL/L (ref 21–32)
CREAT SERPL-MCNC: 0.69 MG/DL (ref 0.6–1.3)
EOSINOPHIL # BLD AUTO: 0.38 THOUSAND/ΜL (ref 0–0.61)
EOSINOPHIL NFR BLD AUTO: 5 % (ref 0–6)
ERYTHROCYTE [DISTWIDTH] IN BLOOD BY AUTOMATED COUNT: 12.4 % (ref 11.6–15.1)
GFR SERPL CREATININE-BSD FRML MDRD: 104 ML/MIN/1.73SQ M
GLUCOSE P FAST SERPL-MCNC: 109 MG/DL (ref 65–99)
HCT VFR BLD AUTO: 39.6 % (ref 34.8–46.1)
HDLC SERPL-MCNC: 58 MG/DL
HGB BLD-MCNC: 13.2 G/DL (ref 11.5–15.4)
IMM GRANULOCYTES # BLD AUTO: 0.02 THOUSAND/UL (ref 0–0.2)
IMM GRANULOCYTES NFR BLD AUTO: 0 % (ref 0–2)
LDLC SERPL CALC-MCNC: 161 MG/DL (ref 0–100)
LYMPHOCYTES # BLD AUTO: 3.04 THOUSANDS/ΜL (ref 0.6–4.47)
LYMPHOCYTES NFR BLD AUTO: 37 % (ref 14–44)
MCH RBC QN AUTO: 29.4 PG (ref 26.8–34.3)
MCHC RBC AUTO-ENTMCNC: 33.3 G/DL (ref 31.4–37.4)
MCV RBC AUTO: 88 FL (ref 82–98)
MONOCYTES # BLD AUTO: 0.87 THOUSAND/ΜL (ref 0.17–1.22)
MONOCYTES NFR BLD AUTO: 10 % (ref 4–12)
NEUTROPHILS # BLD AUTO: 3.91 THOUSANDS/ΜL (ref 1.85–7.62)
NEUTS SEG NFR BLD AUTO: 47 % (ref 43–75)
NRBC BLD AUTO-RTO: 0 /100 WBCS
PLATELET # BLD AUTO: 330 THOUSANDS/UL (ref 149–390)
PMV BLD AUTO: 9.2 FL (ref 8.9–12.7)
POTASSIUM SERPL-SCNC: 3.9 MMOL/L (ref 3.5–5.3)
PROT SERPL-MCNC: 7.8 G/DL (ref 6.4–8.2)
RBC # BLD AUTO: 4.49 MILLION/UL (ref 3.81–5.12)
SODIUM SERPL-SCNC: 137 MMOL/L (ref 136–145)
TRIGL SERPL-MCNC: 76 MG/DL
TSH SERPL DL<=0.05 MIU/L-ACNC: 0.65 UIU/ML (ref 0.36–3.74)
VIT B12 SERPL-MCNC: 258 PG/ML (ref 100–900)
WBC # BLD AUTO: 8.33 THOUSAND/UL (ref 4.31–10.16)

## 2020-09-04 PROCEDURE — 80061 LIPID PANEL: CPT

## 2020-09-04 PROCEDURE — 80053 COMPREHEN METABOLIC PANEL: CPT

## 2020-09-04 PROCEDURE — 85025 COMPLETE CBC W/AUTO DIFF WBC: CPT

## 2020-09-04 PROCEDURE — 72050 X-RAY EXAM NECK SPINE 4/5VWS: CPT

## 2020-09-04 PROCEDURE — 72110 X-RAY EXAM L-2 SPINE 4/>VWS: CPT

## 2020-09-04 PROCEDURE — 82306 VITAMIN D 25 HYDROXY: CPT

## 2020-09-04 PROCEDURE — 82607 VITAMIN B-12: CPT

## 2020-09-04 PROCEDURE — 84443 ASSAY THYROID STIM HORMONE: CPT

## 2020-09-04 PROCEDURE — 36415 COLL VENOUS BLD VENIPUNCTURE: CPT

## 2020-09-09 DIAGNOSIS — R93.89 ABNORMAL X-RAY: Primary | ICD-10-CM

## 2020-09-09 NOTE — RESULT ENCOUNTER NOTE
I reviewed the results with patient  She would like to call us back in the future if she decides to schedule the CT scan, she doesn't think that is what is causing her that much discomfort at this time

## 2020-09-11 ENCOUNTER — TELEPHONE (OUTPATIENT)
Dept: BEHAVIORAL/MENTAL HEALTH CLINIC | Facility: CLINIC | Age: 45
End: 2020-09-11

## 2020-09-14 ENCOUNTER — EVALUATION (OUTPATIENT)
Dept: PHYSICAL THERAPY | Facility: CLINIC | Age: 45
End: 2020-09-14
Payer: COMMERCIAL

## 2020-09-14 DIAGNOSIS — M79.605 LUMBAR PAIN WITH RADIATION DOWN LEFT LEG: Primary | ICD-10-CM

## 2020-09-14 DIAGNOSIS — M54.50 LUMBAR PAIN WITH RADIATION DOWN LEFT LEG: Primary | ICD-10-CM

## 2020-09-14 PROCEDURE — 97161 PT EVAL LOW COMPLEX 20 MIN: CPT | Performed by: PHYSICAL THERAPIST

## 2020-09-14 PROCEDURE — 97140 MANUAL THERAPY 1/> REGIONS: CPT | Performed by: PHYSICAL THERAPIST

## 2020-09-14 NOTE — PROGRESS NOTES
PT Evaluation     Today's date: 2020  Patient name: Wylene Kocher  : 1975  MRN: [id number]  Referring provider: Kvng Carmona MD  Dx:   Encounter Diagnosis     ICD-10-CM    1  Lumbar pain with radiation down left leg  M54 5 Ambulatory referral to Physical Therapy    M79 605    2  Cervical radiculopathy  M54 12 Ambulatory referral to Physical Therapy                  Assessment  Assessment details: Wylene Kocher is a 50 y o  female who presents with LBP associated with SI dysfunction  Pt has decreased lumbar ROM due to pain, decreased B hip strength, and pain with weight bearing  Pt currently has difficulty with prolonged walking/standing, difficulty negotiating stairs, difficulty bending, difficulty dressing  Pt would benefit from skilled PT to address the above impairments and to return to pain free function  Impairments: abnormal or restricted ROM, impaired physical strength, lacks appropriate home exercise program and pain with function  Functional limitations: difficulty with prolonged walking/standing, difficulty negotiating stairs, difficulty bending, difficulty dressing  Symptom irritability: moderateUnderstanding of Dx/Px/POC: good   Prognosis: good    Goals  1  Pt will be independent with HEP upon DC  2  Pt's lumbar ROM will be WNL and pain free to allow for bending and dressing with ease  3  Pt's B LE strength will be at least 4/5 t/o to allow for stair negotiation  4  Pt's pain will be no more than 2/10 to allow for prolonged weight bearing activities      Plan  Patient would benefit from: skilled physical therapy  Planned modality interventions: low level laser therapy  Planned therapy interventions: joint mobilization, manual therapy, neuromuscular re-education, therapeutic activities and therapeutic exercise  Frequency: 2x week  Duration in visits: 12  Duration in weeks: 6  Treatment plan discussed with: patient        Subjective Evaluation    History of Present Illness  Date of onset: 2020  Mechanism of injury: Pt reports that she had an insidious onset of LB pain radiating into L LE about 4 months ago  Pt reports that pain has gotten more constant over the past month  Pt has recently quit her job as a  and reports improvements in her LBP, however hip pain that radiates into L knee still persists  X-rays revealed degeneration of lumbar spine  Pt reports to OP PT  Not a recurrent problem   Quality of life: good    Pain  Current pain ratin  At best pain ratin  At worst pain rating: 10  Location: L hip/LE  Quality: dull ache and sharp  Relieving factors: rest  Aggravating factors: walking and standing  Progression: worsening      Diagnostic Tests  X-ray: abnormal  Treatments  No previous or current treatments  Patient Goals  Patient goals for therapy: decreased pain and independence with ADLs/IADLs          Objective     Palpation   Left   Tenderness of the iliacus and quadratus lumborum  Tenderness     Left Hip   Tenderness in the PSIS  Active Range of Motion     Lumbar   Flexion:  WFL  Extension:  WFL  Left lateral flexion:  with pain Restriction level: moderate  Left rotation:  WFL and with pain  Right rotation:  WFL and with pain    Strength/Myotome Testing     Left Hip   Planes of Motion   Flexion: 3+  Extension: 4-  Abduction: 4-    Right Hip   Planes of Motion   Flexion: 3+  Extension: 4-  Abduction: 4-    Tests     Lumbar   Positive Gaenslen's  Left   Negative crossed SLR and passive SLR  Right   Negative crossed SLR and passive SLR  Left Hip   Positive NASIR and long sit       Additional Tests Details  (+) L ant rotation             Precautions: none      Manuals             Shotgun HVT MD            MET for L ant rot MD            Laser to L QL 4'                         Neuro Re-Ed             Posture tband             Tband walk out with press             Step up with tband pull down             Tband step out with rotation Ther Ex             Bike with LR             PPT with ball squeeze 5"x15            PPT with toe taps x15 ea            Bridging with marching             TA activation with stab cuff             SL clamshells with tband             Prone press up             Prone hip ext             Quad donkey kick             Quad alt UE/LE                                                                                           Ther Activity

## 2020-09-18 ENCOUNTER — OFFICE VISIT (OUTPATIENT)
Dept: PHYSICAL THERAPY | Facility: CLINIC | Age: 45
End: 2020-09-18
Payer: COMMERCIAL

## 2020-09-18 DIAGNOSIS — M54.50 LUMBAR PAIN WITH RADIATION DOWN LEFT LEG: Primary | ICD-10-CM

## 2020-09-18 DIAGNOSIS — M79.605 LUMBAR PAIN WITH RADIATION DOWN LEFT LEG: Primary | ICD-10-CM

## 2020-09-18 PROCEDURE — 97110 THERAPEUTIC EXERCISES: CPT | Performed by: PHYSICAL THERAPIST

## 2020-09-18 PROCEDURE — 97140 MANUAL THERAPY 1/> REGIONS: CPT | Performed by: PHYSICAL THERAPIST

## 2020-09-18 PROCEDURE — 97112 NEUROMUSCULAR REEDUCATION: CPT | Performed by: PHYSICAL THERAPIST

## 2020-09-18 NOTE — PROGRESS NOTES
Daily Note     Today's date: 2020  Patient name: Reymundo Mccarty  : 1975  MRN: [id number]  Referring provider: Gillian Dick MD  Dx:   Encounter Diagnosis     ICD-10-CM    1  Lumbar pain with radiation down left leg  M54 5     M79 605                   Subjective: "I've been feeling better but I can definitely feel when it goes in and out "      Objective: See treatment diary below      Assessment: Positive for L ant rotation corrected by MET  Pt reported relief post session  Plan: Continue per plan of care        Precautions: none      Manuals            Shotgun HVT MD            MET for L ant rot MD DAWSON           Laser to L QL 4' 4'           PA glides to lumbar spine  MD           Neuro Re-Ed             Posture tband  GTB x15ea           Tband walk out with press  GTB x15ea           Step up with tband pull down             Tband step out with rotation                                                    Ther Ex             Bike with LR  6'           PPT with ball squeeze 5"x15 5"x20           PPT with toe taps x15 ea x15ea           Bridging with ball squeeze  x15           Bridging with marching             TA activation with stab cuff  10x10"           SL clamshells with tband             Prone prop  2' With glute squeeze          Prone press up  x10           Prone hip ext             Quad donkey kick             Quad alt UE/LE                                                                                           Ther Activity

## 2020-09-21 ENCOUNTER — APPOINTMENT (OUTPATIENT)
Dept: PHYSICAL THERAPY | Facility: CLINIC | Age: 45
End: 2020-09-21
Payer: COMMERCIAL

## 2020-09-24 ENCOUNTER — OFFICE VISIT (OUTPATIENT)
Dept: PHYSICAL THERAPY | Facility: CLINIC | Age: 45
End: 2020-09-24
Payer: COMMERCIAL

## 2020-09-24 DIAGNOSIS — M54.50 LUMBAR PAIN WITH RADIATION DOWN LEFT LEG: Primary | ICD-10-CM

## 2020-09-24 DIAGNOSIS — M79.605 LUMBAR PAIN WITH RADIATION DOWN LEFT LEG: Primary | ICD-10-CM

## 2020-09-24 PROCEDURE — 97110 THERAPEUTIC EXERCISES: CPT

## 2020-09-24 PROCEDURE — 97140 MANUAL THERAPY 1/> REGIONS: CPT

## 2020-09-24 NOTE — PROGRESS NOTES
Daily Note     Today's date: 2020  Patient name: Ana Dos Santos  : 1975  MRN: [id number]  Referring provider: Leland Alberts MD  Dx:   Encounter Diagnosis     ICD-10-CM    1  Lumbar pain with radiation down left leg  M54 5     M79 605                   Subjective: "I'm definitely feeling better "      Objective: See treatment diary below      Assessment: Performed new exercise and ex progressions w/o complaint  Responded well to manual therapies  Tolerated treatment well  Patient would benefit from continued PT      Plan: Continue per plan of care        Precautions: none      Manuals           Shotgun HVT MD            MET for L tereso dumont MD, MD Not needed          Laser to L QL 4' 4' 4'          PA glides to lumbar spine  MD DL          Neuro Re-Ed             Posture tband  GTB x15ea GTB  x20ea          Tband walk out with press  GTB x15ea GTB  x15ea          Step up with tband pull down             Tband step out with rotation                                                    Ther Ex             Bike with LR  6' 6'          PPT with ball squeeze 5"x15 5"x20 5"x20          PPT with toe taps x15 ea x15ea x20ea          Bridging with ball squeeze  x15 5"x15          Bridging with marching             TA activation with stab cuff  10x10" 10x10"          SL clamshells with tband             Prone prop  2' 2', with glute squeeze  5"x15          Prone press up  x10 x10          Prone hip ext             Quad donkey kick             Quad alt UE/LE                                                                                           Ther Activity

## 2020-09-25 ENCOUNTER — HOSPITAL ENCOUNTER (OUTPATIENT)
Dept: RADIOLOGY | Facility: HOSPITAL | Age: 45
Discharge: HOME | End: 2020-09-25
Payer: COMMERCIAL

## 2020-09-25 DIAGNOSIS — R93.89 ABNORMAL X-RAY: ICD-10-CM

## 2020-09-25 PROCEDURE — G1004 CDSM NDSC: HCPCS

## 2020-09-25 PROCEDURE — 74176 CT ABD & PELVIS W/O CONTRAST: CPT

## 2020-09-29 DIAGNOSIS — N20.0 RENAL CALCULUS, RIGHT: Primary | ICD-10-CM

## 2020-10-02 ENCOUNTER — OFFICE VISIT (OUTPATIENT)
Dept: PHYSICAL THERAPY | Facility: CLINIC | Age: 45
End: 2020-10-02
Payer: COMMERCIAL

## 2020-10-02 DIAGNOSIS — M54.40 CHRONIC BILATERAL LOW BACK PAIN WITH SCIATICA, SCIATICA LATERALITY UNSPECIFIED: Primary | ICD-10-CM

## 2020-10-02 DIAGNOSIS — M79.605 LUMBAR PAIN WITH RADIATION DOWN LEFT LEG: Primary | ICD-10-CM

## 2020-10-02 DIAGNOSIS — G89.29 CHRONIC BILATERAL LOW BACK PAIN WITH SCIATICA, SCIATICA LATERALITY UNSPECIFIED: Primary | ICD-10-CM

## 2020-10-02 DIAGNOSIS — M54.50 LUMBAR PAIN WITH RADIATION DOWN LEFT LEG: Primary | ICD-10-CM

## 2020-10-02 PROCEDURE — 97530 THERAPEUTIC ACTIVITIES: CPT

## 2020-10-02 PROCEDURE — 97110 THERAPEUTIC EXERCISES: CPT

## 2020-10-06 ENCOUNTER — OFFICE VISIT (OUTPATIENT)
Dept: PHYSICAL THERAPY | Facility: CLINIC | Age: 45
End: 2020-10-06
Payer: COMMERCIAL

## 2020-10-06 DIAGNOSIS — M79.605 LUMBAR PAIN WITH RADIATION DOWN LEFT LEG: Primary | ICD-10-CM

## 2020-10-06 DIAGNOSIS — M54.50 LUMBAR PAIN WITH RADIATION DOWN LEFT LEG: Primary | ICD-10-CM

## 2020-10-06 PROCEDURE — 97530 THERAPEUTIC ACTIVITIES: CPT

## 2020-10-06 PROCEDURE — 97110 THERAPEUTIC EXERCISES: CPT

## 2020-10-09 ENCOUNTER — OFFICE VISIT (OUTPATIENT)
Dept: PHYSICAL THERAPY | Facility: CLINIC | Age: 45
End: 2020-10-09
Payer: COMMERCIAL

## 2020-10-09 DIAGNOSIS — M54.50 LUMBAR PAIN WITH RADIATION DOWN LEFT LEG: Primary | ICD-10-CM

## 2020-10-09 DIAGNOSIS — M79.605 LUMBAR PAIN WITH RADIATION DOWN LEFT LEG: Primary | ICD-10-CM

## 2020-10-09 PROCEDURE — 97110 THERAPEUTIC EXERCISES: CPT

## 2020-10-09 PROCEDURE — 97530 THERAPEUTIC ACTIVITIES: CPT

## 2020-10-14 DIAGNOSIS — F41.8 ANXIETY WITH DEPRESSION: ICD-10-CM

## 2020-10-15 RX ORDER — ALPRAZOLAM 0.5 MG/1
0.5 TABLET ORAL 2 TIMES DAILY PRN
Qty: 60 TABLET | Refills: 0 | Status: SHIPPED | OUTPATIENT
Start: 2020-10-15 | End: 2020-11-17

## 2020-10-21 ENCOUNTER — OFFICE VISIT (OUTPATIENT)
Dept: PHYSICAL THERAPY | Facility: CLINIC | Age: 45
End: 2020-10-21
Payer: COMMERCIAL

## 2020-10-21 DIAGNOSIS — M54.50 LUMBAR PAIN WITH RADIATION DOWN LEFT LEG: Primary | ICD-10-CM

## 2020-10-21 DIAGNOSIS — M79.605 LUMBAR PAIN WITH RADIATION DOWN LEFT LEG: Primary | ICD-10-CM

## 2020-10-21 PROCEDURE — 97530 THERAPEUTIC ACTIVITIES: CPT

## 2020-10-21 PROCEDURE — 97110 THERAPEUTIC EXERCISES: CPT

## 2020-10-26 ENCOUNTER — APPOINTMENT (OUTPATIENT)
Dept: PHYSICAL THERAPY | Facility: CLINIC | Age: 45
End: 2020-10-26
Payer: COMMERCIAL

## 2020-10-28 ENCOUNTER — APPOINTMENT (OUTPATIENT)
Dept: PHYSICAL THERAPY | Facility: CLINIC | Age: 45
End: 2020-10-28
Payer: COMMERCIAL

## 2020-11-16 DIAGNOSIS — F41.8 ANXIETY WITH DEPRESSION: ICD-10-CM

## 2020-11-17 RX ORDER — PAROXETINE HYDROCHLORIDE 37.5 MG/1
37.5 TABLET, FILM COATED, EXTENDED RELEASE ORAL EVERY MORNING
Qty: 90 TABLET | Refills: 1 | Status: SHIPPED | OUTPATIENT
Start: 2020-11-17 | End: 2021-04-26

## 2020-11-17 RX ORDER — ALPRAZOLAM 0.5 MG/1
0.5 TABLET ORAL 2 TIMES DAILY PRN
Qty: 60 TABLET | Refills: 0 | Status: SHIPPED | OUTPATIENT
Start: 2020-11-17 | End: 2020-12-21

## 2020-12-20 DIAGNOSIS — F41.8 ANXIETY WITH DEPRESSION: ICD-10-CM

## 2020-12-21 RX ORDER — ALPRAZOLAM 0.5 MG/1
0.5 TABLET ORAL 2 TIMES DAILY PRN
Qty: 60 TABLET | Refills: 0 | Status: SHIPPED | OUTPATIENT
Start: 2020-12-21 | End: 2021-01-20

## 2021-01-19 DIAGNOSIS — F41.8 ANXIETY WITH DEPRESSION: ICD-10-CM

## 2021-01-20 RX ORDER — ALPRAZOLAM 0.5 MG/1
0.5 TABLET ORAL 2 TIMES DAILY PRN
Qty: 60 TABLET | Refills: 0 | Status: SHIPPED | OUTPATIENT
Start: 2021-01-20 | End: 2021-02-20

## 2021-02-10 ENCOUNTER — TELEPHONE (OUTPATIENT)
Dept: FAMILY MEDICINE CLINIC | Facility: CLINIC | Age: 46
End: 2021-02-10

## 2021-02-10 DIAGNOSIS — Z20.822 EXPOSURE TO COVID-19 VIRUS: ICD-10-CM

## 2021-02-10 DIAGNOSIS — Z20.822 EXPOSURE TO COVID-19 VIRUS: Primary | ICD-10-CM

## 2021-02-10 PROCEDURE — U0003 INFECTIOUS AGENT DETECTION BY NUCLEIC ACID (DNA OR RNA); SEVERE ACUTE RESPIRATORY SYNDROME CORONAVIRUS 2 (SARS-COV-2) (CORONAVIRUS DISEASE [COVID-19]), AMPLIFIED PROBE TECHNIQUE, MAKING USE OF HIGH THROUGHPUT TECHNOLOGIES AS DESCRIBED BY CMS-2020-01-R: HCPCS | Performed by: FAMILY MEDICINE

## 2021-02-10 PROCEDURE — U0005 INFEC AGEN DETEC AMPLI PROBE: HCPCS | Performed by: FAMILY MEDICINE

## 2021-02-10 NOTE — TELEPHONE ENCOUNTER
Pt's  tested positive for COVID on 02/06/21 she would like to be tested for COVID  She currently presents no symptoms  Please advise

## 2021-02-11 LAB — SARS-COV-2 RNA RESP QL NAA+PROBE: NEGATIVE

## 2021-02-19 DIAGNOSIS — F41.8 ANXIETY WITH DEPRESSION: ICD-10-CM

## 2021-02-20 RX ORDER — ALPRAZOLAM 0.5 MG/1
0.5 TABLET ORAL 2 TIMES DAILY PRN
Qty: 60 TABLET | Refills: 0 | Status: SHIPPED | OUTPATIENT
Start: 2021-02-20 | End: 2021-03-22

## 2021-02-25 ENCOUNTER — APPOINTMENT (OUTPATIENT)
Dept: RADIOLOGY | Facility: MEDICAL CENTER | Age: 46
End: 2021-02-25
Payer: COMMERCIAL

## 2021-02-25 ENCOUNTER — OFFICE VISIT (OUTPATIENT)
Dept: URGENT CARE | Facility: MEDICAL CENTER | Age: 46
End: 2021-02-25
Payer: COMMERCIAL

## 2021-02-25 VITALS
OXYGEN SATURATION: 98 % | SYSTOLIC BLOOD PRESSURE: 155 MMHG | HEIGHT: 63 IN | HEART RATE: 65 BPM | BODY MASS INDEX: 35.44 KG/M2 | TEMPERATURE: 98.6 F | WEIGHT: 200 LBS | DIASTOLIC BLOOD PRESSURE: 99 MMHG | RESPIRATION RATE: 20 BRPM

## 2021-02-25 DIAGNOSIS — M79.604 RIGHT LEG PAIN: ICD-10-CM

## 2021-02-25 DIAGNOSIS — M79.604 RIGHT LEG PAIN: Primary | ICD-10-CM

## 2021-02-25 PROCEDURE — 73590 X-RAY EXAM OF LOWER LEG: CPT

## 2021-02-25 PROCEDURE — G0382 LEV 3 HOSP TYPE B ED VISIT: HCPCS | Performed by: FAMILY MEDICINE

## 2021-02-25 NOTE — PATIENT INSTRUCTIONS
X-rays are normal   Ace wrap for support  Continue Tylenol  If symptoms worsen follow up in the emergency room  Follow up with PCP in 3-5 days  Proceed to  ER if symptoms worsen

## 2021-02-25 NOTE — PROGRESS NOTES
[facility]        NAME: Everardo Wilson is a 50 y o  female  : 1975    MRN: [id number]  DATE: 2021  TIME: 12:07 PM    Assessment and Plan   Right leg pain [M79 604]  1  Right leg pain  XR tibia fibula 2 vw right         Patient Instructions       X-rays are normal   Ace wrap for support  Continue Tylenol  If symptoms worsen follow up in the emergency room  Follow up with PCP in 3-5 days  Proceed to  ER if symptoms worsen  Chief Complaint     Chief Complaint   Patient presents with    Leg Injury     Na [address] states she fell nto her R knee and shin last week  Patient states the pain is getting worse         History of Present Illness       Leg Injury (Patietn states she fell nto her R knee and shin last week  Patient states the pain is getting worse)  Patient has only been using Tylenol because she has GI issues  The time of the initial injury patient was just having pain in the right knee and the upper shin  As that is started to feel better, she is having some mild pain in the lateral aspects calf  And swelling down in the, patient denies any ankle pain  Review of Systems   Review of Systems   Constitutional: Negative  Respiratory: Negative  Cardiovascular: Negative  Musculoskeletal:          Right lower leg pain           Current Medications       Current Outpatient Medications:     ALPRAZolam (XANAX) 0 5 mg tablet, TAKE 1 TABLET (0 5 MG TOTAL) BY MOUTH 2 (TWO) TIMES A DAY AS NEEDED FOR ANXIETY , Disp: 60 tablet, Rfl: 0    fluticasone (FLONASE) 50 mcg/act nasal spray, 2 sprays into each nostril daily, Disp: 16 g, Rfl: 0    HUMIRA PEN-PSORIASIS STARTER 40 MG/0 8ML PNKT, 40 mg every 14 (fourteen) days  , Disp: , Rfl:     PARoxetine (PAXIL-CR) 37 5 mg 24 hr tablet, TAKE 1 TABLET (37 5 MG TOTAL) BY MOUTH EVERY MORNING, Disp: 90 tablet, Rfl: 1    Probiotic Product (PROBIOTIC-10) CAPS, Take 2 capsules by mouth daily, Disp: , Rfl:     triamcinolone (KENALOG) 0 1 % cream, APPLY TWICE A DAY, Disp: , Rfl: 3    clobetasol (TEMOVATE) 0 05 % external solution, APPLY TO SCALP TWICE DAILY, Disp: , Rfl: 2    predniSONE 10 mg tablet, Take 4 tabs for 4 days, then 3 tabs for 2 days, then 2 tabs for 2 days and then 1 tab for 2 day (Patient not taking: Reported on 2/25/2021), Disp: 28 tablet, Rfl: 0    Current Allergies     Allergies as of 02/25/2021    (No Known Allergies)            The following portions of the patient's history were reviewed and updated as appropriate: allergies, current medications, past family history, past medical history, past social history, past surgical history and problem list      Past Medical History:   Diagnosis Date    Anxiety     Chronic kidney disease     R kidney stone    Colitis     Last Assessed: 5/18/2017    Depression     Diarrhea     Elevated ALT measurement     Last Assessed: 4/12/2017    Infectious mononucleosis     Irritable bowel syndrome with diarrhea     Last Assessed: 3/15/2017    Liver lesion     Last Assessed: 4/3/2017    Microscopic colitis     Migraine     not for 3 years    Mucus in stool     Last Assessed: 3/15/2017    Obesity     Psoriasis     Varicella        Past Surgical History:   Procedure Laterality Date    DENTAL SURGERY      tooth extraction    NO PAST SURGERIES      DC COLONOSCOPY FLX DX W/COLLJ SPEC WHEN PFRMD N/A 5/18/2017    Procedure: EGD with bx AND COLONOSCOPY with bx;  Surgeon: Zhang Edmond MD;  Location: [facility]; Service: Gastroenterology       Family History   Problem Relation Age of Onset    Hypertension Mother     Hypothyroidism Mother     Hypertension Father     Stroke Paternal Grandmother         Syndrome    Cancer Paternal Grandfather          Medications have been verified  Objective   /99   Pulse 65   Temp 98 6 °F (37 °C)   Resp 20   Ht 5' 3" (1 6 m)   Wt 90 7 kg (200 lb)   SpO2 98%   BMI 35 43 kg/m²   No LMP recorded         Physical Exam     Physical Exam  Constitutional:       Appearance: Normal appearance  She is well-developed  Cardiovascular:      Rate and Rhythm: Normal rate and regular rhythm  Pulmonary:      Effort: Pulmonary effort is normal       Breath sounds: Normal breath sounds  Musculoskeletal:      Right knee: She exhibits swelling and ecchymosis  She exhibits normal range of motion and no effusion  Tenderness found  Patellar tendon tenderness noted  Right ankle: She exhibits swelling  She exhibits normal range of motion and no ecchymosis  No tenderness  Legs:    Neurological:      Mental Status: She is alert

## 2021-03-10 DIAGNOSIS — Z23 ENCOUNTER FOR IMMUNIZATION: ICD-10-CM

## 2021-03-19 ENCOUNTER — IMMUNIZATIONS (OUTPATIENT)
Dept: FAMILY MEDICINE CLINIC | Facility: HOSPITAL | Age: 46
End: 2021-03-19

## 2021-03-19 DIAGNOSIS — Z23 ENCOUNTER FOR IMMUNIZATION: Primary | ICD-10-CM

## 2021-03-19 PROCEDURE — 91300 SARS-COV-2 / COVID-19 MRNA VACCINE (PFIZER-BIONTECH) 30 MCG: CPT

## 2021-03-19 PROCEDURE — 0001A SARS-COV-2 / COVID-19 MRNA VACCINE (PFIZER-BIONTECH) 30 MCG: CPT

## 2021-03-20 DIAGNOSIS — F41.8 ANXIETY WITH DEPRESSION: ICD-10-CM

## 2021-03-22 RX ORDER — ALPRAZOLAM 0.5 MG/1
0.5 TABLET ORAL 2 TIMES DAILY PRN
Qty: 60 TABLET | Refills: 0 | Status: SHIPPED | OUTPATIENT
Start: 2021-03-22 | End: 2021-04-26

## 2021-03-30 ENCOUNTER — CONSULT (OUTPATIENT)
Dept: UROLOGY | Facility: AMBULATORY SURGERY CENTER | Age: 46
End: 2021-03-30
Payer: COMMERCIAL

## 2021-03-30 VITALS
HEART RATE: 65 BPM | BODY MASS INDEX: 35.79 KG/M2 | HEIGHT: 63 IN | WEIGHT: 202 LBS | DIASTOLIC BLOOD PRESSURE: 80 MMHG | SYSTOLIC BLOOD PRESSURE: 130 MMHG

## 2021-03-30 DIAGNOSIS — N20.0 RENAL CALCULUS, RIGHT: Primary | ICD-10-CM

## 2021-03-30 LAB
SL AMB  POCT GLUCOSE, UA: NORMAL
SL AMB LEUKOCYTE ESTERASE,UA: NORMAL
SL AMB POCT BILIRUBIN,UA: NORMAL
SL AMB POCT BLOOD,UA: NORMAL
SL AMB POCT CLARITY,UA: CLEAR
SL AMB POCT COLOR,UA: YELLOW
SL AMB POCT KETONES,UA: NORMAL
SL AMB POCT NITRITE,UA: NORMAL
SL AMB POCT PH,UA: 6
SL AMB POCT SPECIFIC GRAVITY,UA: 1.02
SL AMB POCT URINE PROTEIN: NORMAL
SL AMB POCT UROBILINOGEN: NORMAL

## 2021-03-30 PROCEDURE — 99203 OFFICE O/P NEW LOW 30 MIN: CPT | Performed by: NURSE PRACTITIONER

## 2021-03-30 PROCEDURE — 81002 URINALYSIS NONAUTO W/O SCOPE: CPT | Performed by: NURSE PRACTITIONER

## 2021-03-30 PROCEDURE — 1036F TOBACCO NON-USER: CPT | Performed by: NURSE PRACTITIONER

## 2021-03-30 PROCEDURE — 3008F BODY MASS INDEX DOCD: CPT | Performed by: NURSE PRACTITIONER

## 2021-03-30 NOTE — PATIENT INSTRUCTIONS
Us kidney and bladder in 1 year   Xray in 1 year  Make sure you keep yourself hydrated with at least 40-60 oz pf water per day  Refer to [facility] pt education packet  Call the office for concerns or questions

## 2021-03-30 NOTE — PROGRESS NOTES
3/30/2021      Chief Complaint   Patient presents with    Nephrolithiasis     Assessment and Plan    50 y o  female managed by NEW PATIENT  1   Nephrolithiasis  ·   Asymptomatic  ·  CT stone study performed 09/25/2020 reveals a 4 8 mm calculi in the right lower pole of the kidney  No hydronephrosis noted  ·  discussed dietary behavior modifications to reduce future stone formation  ·  [facility] kidney stone patient education packet provided  ·  ultrasound of kidney and bladder and KUB ordered for 1 year  ·  ER precautions discussed  ·  follow up in the office in 1 year    History of Present Illness  Winona Gitelman is a 50 y o  female here for follow up evaluation of nephrolithiasis  Patient reports a CT of the abdomen performed 9/252020 secondary to falling with complaints of back pain  Status post fall  An incidental finding of 4 8 mm right lower pole kidney stone noted  Patient denies a history of nephrolithiasis  She currently denies all lower urinary tract symptoms  She denies a family history of nephrolithiasis  Review of Systems   Constitutional: Negative for chills and fever  Respiratory: Negative for cough and shortness of breath  Cardiovascular: Negative for chest pain  Gastrointestinal: Negative for abdominal distention, abdominal pain, blood in stool, nausea and vomiting  Genitourinary: Negative for difficulty urinating, dysuria, enuresis, flank pain, frequency, hematuria and urgency  Skin: Negative for rash  Urinary Incontinence Screening      Most Recent Value   Urinary Incontinence   Urinary Incontinence? Yes Miguelito [place] incontinence]   Incomplete emptying? No   Urinary frequency? No   Urinary urgency? No   Urinary hesitancy? No   Dysuria (painful difficult urination)? No   Nocturia (waking up to use the bathroom)? No   Straining (having to push to go)? No   Weak stream?  No   Intermittent stream?  No   Post void dribbling? No   Vaginal pressure?   No   Vaginal dryness? No                 Past Medical History  Past Medical History:   Diagnosis Date    Anxiety     Chronic kidney disease     R kidney stone    Colitis     Last Assessed: 2017    Depression     Diarrhea     Elevated ALT measurement     Last Assessed: 2017    Infectious mononucleosis     Irritable bowel syndrome with diarrhea     Last Assessed: 3/15/2017    Liver lesion     Last Assessed: 4/3/2017    Microscopic colitis     Migraine     not for 3 years    Mucus in stool     Last Assessed: 3/15/2017    Obesity     Psoriasis     Varicella        Past Social History  Past Surgical History:   Procedure Laterality Date    DENTAL SURGERY      tooth extraction    NO PAST SURGERIES      VT COLONOSCOPY FLX DX W/COLLJ SPEC WHEN PFRMD N/A 2017    Procedure: EGD with bx AND COLONOSCOPY with bx;  Surgeon: Sammi Remy MD;  Location: [facility];   Service: Gastroenterology     Social History     Tobacco Use   Smoking Status Former Smoker    Types: Cigarettes    Quit date: 2014    Years since quittin 8   Smokeless Tobacco Never Used       Past Family History  Family History   Problem Relation Age of Onset    Hypertension Mother    [place] Hypothyroidism Mother     Hypertension Father     Stroke Paternal Grandmother         Syndrome    Cancer Paternal Grandfather        Past Social history  Social History     Socioeconomic History    Marital status: /Civil Union     Spouse name: Not on file    Number of children: Not on file    Years of education: Not on file    Highest education level: Not on file   Occupational History    Not on file   Social Needs    Financial resource strain: Not on file    Food insecurity     Worry: Not on file     Inability: Not on file    Transportation needs     Medical: Not on file     Non-medical: Not on file   Tobacco Use    Smoking status: Former Smoker     Types: Cigarettes     Quit date: 2014     Years since quittin 8    Smokeless tobacco: Never Used   Substance and Sexual Activity    Alcohol use: Yes     Alcohol/week: 1 0 standard drinks     Types: 1 Glasses of wine per week     Frequency: Monthly or less     Drinks per session: 1 or 2     Binge frequency: Never     Comment: monthly    Drug use: No    Sexual activity: Not on file   Lifestyle    Physical activity     Days per week: Not on file     Minutes per session: Not on file    Stress: Not on file   Relationships    Social connections     Talks on phone: Not on file     Gets together: Not on file     Attends Voodoo service: Not on file     Active member of club or organization: Not on file     Attends meetings of clubs or organizations: Not on file     Relationship status: Not on file    Intimate partner violence     Fear of current or ex partner: Not on file     Emotionally abused: Not on file     Physically abused: Not on file     Forced sexual activity: Not on file   Other Topics Concern    Not on file   Social History Narrative    Not on file       Current Medications  Current Outpatient Medications   Medication Sig Dispense Refill    ALPRAZolam (XANAX) 0 5 mg tablet TAKE 1 TABLET (0 5 MG TOTAL) BY MOUTH 2 (TWO) TIMES A DAY AS NEEDED FOR ANXIETY   60 tablet 0    clobetasol (TEMOVATE) 0 05 % external solution APPLY TO SCALP TWICE DAILY  2    fluticasone (FLONASE) 50 mcg/act nasal spray 2 sprays into each nostril daily 16 g 0    HUMIRA PEN-PSORIASIS STARTER 40 MG/0 8ML PNKT 40 mg every 14 (fourteen) days        PARoxetine (PAXIL-CR) 37 5 mg 24 hr tablet TAKE 1 TABLET (37 5 MG TOTAL) BY MOUTH EVERY MORNING 90 tablet 1    Probiotic Product (PROBIOTIC-10) CAPS Take 2 capsules by mouth daily      triamcinolone (KENALOG) 0 1 % cream APPLY TWICE A DAY  3    predniSONE 10 mg tablet Take 4 tabs for 4 days, then 3 tabs for 2 days, then 2 tabs for 2 days and then 1 tab for 2 day (Patient not taking: Reported on 3/30/2021) 28 tablet 0     No current facility-administered medications for this visit  Allergies  No Known Allergies      The following portions of the patient's history were reviewed and updated as appropriate: allergies, current medications, past medical history, past social history, past surgical history and problem list       Vitals  Vitals:    03/30/21 0936   BP: 130/80   Pulse: 65   Weight: 91 6 kg (202 lb)   Height: 5' 3" (1 6 m)           Physical Exam  Physical Exam  Vitals signs reviewed  Constitutional:       General: She is not in acute distress  Appearance: Normal appearance  Eyes:      Pupils: Pupils are equal, round, and reactive to light  Cardiovascular:      Rate and Rhythm: Normal rate and regular rhythm  Heart sounds: Normal heart sounds  Pulmonary:      Effort: Pulmonary effort is normal  No respiratory distress  Breath sounds: Normal breath sounds  Abdominal:      Tenderness: There is no right CVA tenderness or left CVA tenderness  Musculoskeletal: Normal range of motion  Skin:     General: Skin is warm and dry  Neurological:      General: No focal deficit present  Mental Status: She is alert     Psychiatric:         Mood and Affect: Mood normal          Behavior: Behavior normal        Results  Recent Results (from the past 1 hour(s))   POCT urine dip    Collection Time: 03/30/21  9:46 AM   Result Value Ref Range    LEUKOCYTE ESTERASE,UA -     NITRITE,UA -     SL AMB POCT UROBILINOGEN -     POCT URINE PROTEIN -      PH,UA 6 0     BLOOD,UA -     SPECIFIC GRAVITY,UA 1 020     KETONES,UA -     BILIRUBIN,UA -     GLUCOSE, UA -      COLOR,UA yellow     CLARITY,UA clear    ]  No results found for: PSA  Lab Results   Component Value Date    CALCIUM 8 8 09/04/2020     03/07/2017    K 3 9 09/04/2020    CO2 25 09/04/2020     09/04/2020    BUN 15 09/04/2020    CREATININE 0 69 09/04/2020     Lab Results   Component Value Date    WBC 8 33 09/04/2020    HGB 13 2 09/04/2020    HCT 39 6 09/04/2020    MCV 88 09/04/2020     09/04/2020     Orders  Orders Placed This Encounter   Procedures    POCT urine dip       VIJAYA Walters

## 2021-04-08 ENCOUNTER — OFFICE VISIT (OUTPATIENT)
Dept: FAMILY MEDICINE CLINIC | Facility: CLINIC | Age: 46
End: 2021-04-08
Payer: COMMERCIAL

## 2021-04-08 VITALS
WEIGHT: 202 LBS | SYSTOLIC BLOOD PRESSURE: 122 MMHG | BODY MASS INDEX: 35.79 KG/M2 | HEIGHT: 63 IN | DIASTOLIC BLOOD PRESSURE: 74 MMHG | RESPIRATION RATE: 14 BRPM | TEMPERATURE: 98.5 F | OXYGEN SATURATION: 97 % | HEART RATE: 66 BPM

## 2021-04-08 DIAGNOSIS — Z00.00 ANNUAL PHYSICAL EXAM: ICD-10-CM

## 2021-04-08 DIAGNOSIS — L40.9 PSORIASIS: ICD-10-CM

## 2021-04-08 DIAGNOSIS — E78.2 MIXED HYPERLIPIDEMIA: ICD-10-CM

## 2021-04-08 DIAGNOSIS — E66.9 OBESITY (BMI 30-39.9): ICD-10-CM

## 2021-04-08 DIAGNOSIS — F41.8 ANXIETY WITH DEPRESSION: ICD-10-CM

## 2021-04-08 DIAGNOSIS — R73.01 IFG (IMPAIRED FASTING GLUCOSE): Primary | ICD-10-CM

## 2021-04-08 DIAGNOSIS — Z12.31 SCREENING MAMMOGRAM, ENCOUNTER FOR: ICD-10-CM

## 2021-04-08 PROCEDURE — 99396 PREV VISIT EST AGE 40-64: CPT | Performed by: FAMILY MEDICINE

## 2021-04-08 PROCEDURE — 1036F TOBACCO NON-USER: CPT | Performed by: FAMILY MEDICINE

## 2021-04-08 PROCEDURE — 3008F BODY MASS INDEX DOCD: CPT | Performed by: FAMILY MEDICINE

## 2021-04-08 PROCEDURE — 3725F SCREEN DEPRESSION PERFORMED: CPT | Performed by: FAMILY MEDICINE

## 2021-04-08 PROCEDURE — 99214 OFFICE O/P EST MOD 30 MIN: CPT | Performed by: FAMILY MEDICINE

## 2021-04-08 RX ORDER — IBUPROFEN 200 MG
1 CAPSULE ORAL DAILY
COMMUNITY

## 2021-04-08 RX ORDER — MELATONIN
1000 DAILY
COMMUNITY

## 2021-04-08 NOTE — PROGRESS NOTES
Chief Complaint   Patient presents with    Physical Exam     Annual Physical     Health Maintenance   Topic Date Due    Pneumococcal Vaccine: Pediatrics (0 to 5 Years) and At-Risk Patients (6 to 59 Years) (1 of 3 - PCV13) Never done    HIV Screening  Never done    Annual Physical  Never done    DTaP,Tdap,and Td Vaccines (1 - Tdap) Never done    Cervical Cancer Screening  Never done    BMI: Followup Plan  09/24/2020    PT PLAN OF CARE  10/14/2020    COVID-19 Vaccine (2 - Pfizer 2-dose series) 04/09/2021    BMI: Adult  04/08/2022    Depression Remission PHQ  04/08/2022    Hepatitis C Screening  Completed    Influenza Vaccine  Completed    HIB Vaccine  Aged Out    Hepatitis B Vaccine  Aged Out    IPV Vaccine  Aged Out    Hepatitis A Vaccine  Aged Out    Meningococcal ACWY Vaccine  Aged Out    HPV Vaccine  Aged Out            Assessment/Plan:    Anxiety with depression  PHQ-9 score 0 today  MARILY-7 score 13 today  Continue paxil-CR 37 5mg QD  Use xanax 0 5mg bid prn  SE educated pt  IFG (impaired fasting glucose)  Low carb diet  Will check hgA1C  Mixed hyperlipidemia  Low fat diet  Will check labs  Psoriasis  FU dermatology  Continue humira  Got flu shot  Got 1st Covid19 vaccine  Due for pap  [facility] on [address] per pt  Due for mammogram  Give script  [facility] GI for colonoscopy  RTO in 6 months  Diagnoses and all orders for this visit:    IFG (impaired fasting glucose)  -     Comprehensive metabolic panel; Future  -     Hemoglobin A1C; Future  -     Lipid Panel with Direct LDL reflex; Future    Anxiety with depression    Mixed hyperlipidemia  -     Comprehensive metabolic panel; Future  -     Hemoglobin A1C; Future  -     Lipid Panel with Direct LDL reflex; Future    Psoriasis    Screening mammogram, encounter for  -     Mammo screening bilateral w cad; Future    Annual physical exam  -     Comprehensive metabolic panel;  Future  -     Hemoglobin A1C; Future  -     Lipid Panel with Direct LDL reflex; Future    Obesity (BMI 30-39  9)    Other orders  -     calcium carbonate (OS-WALDO) 1250 (500 Ca) MG tablet; Take 1 tablet by mouth daily  -     cholecalciferol (VITAMIN D3) 1,000 units tablet; Take 1,000 Units by mouth daily          Subjective:      Patient ID: Tereso Nichols is a 50 y o  female  HPI    Pt is here by herself  Anxiety/depression----Worse during QEVSM08 pandemic  Better after she got her 1st Covid19 vaccine  She is on paxil 37 5mg QD for years which helped  She uses xanax 0 5mg bid prn  IFG---Tried to eat healthy  Hyperlipidemia---Tried to eat healthy and exercise regularly  Psoriasis---FU dermatology  She is on humira and creams  No smoking  2 drinks per month  NO drugs  The following portions of the patient's history were reviewed and updated as appropriate: allergies, current medications, past family history, past medical history, past social history, past surgical history and problem list     Review of Systems   Constitutional: Negative for appetite change, chills and fever  HENT: Negative for congestion, ear pain, sinus pain and sore throat  Eyes: Negative for discharge and itching  Respiratory: Negative for apnea, cough, chest tightness, shortness of breath and wheezing  Cardiovascular: Negative for chest pain, palpitations and leg swelling  Gastrointestinal: Negative for abdominal pain, anal bleeding, constipation, diarrhea, nausea and vomiting  Endocrine: Negative for cold intolerance, heat intolerance and polyuria  Genitourinary: Negative for difficulty urinating and dysuria  Musculoskeletal: Negative for arthralgias, back pain and myalgias  Skin: Negative for rash  Neurological: Negative for dizziness and headaches  Psychiatric/Behavioral: Negative for agitation, self-injury, sleep disturbance and suicidal ideas  The patient is nervous/anxious            Objective:      /74 (BP Location: Left arm, Patient Position: Sitting, Cuff Size: Large)   Pulse 66   Temp 98 5 °F (36 9 °C) (Tympanic)   Resp 14   Ht 5' 3" (1 6 m)   Wt 91 6 kg (202 lb)   SpO2 97%   BMI 35 78 kg/m²          Physical Exam  Constitutional:       General: She is not in acute distress  Appearance: She is well-developed  HENT:      Head: Normocephalic  Eyes:      General:         Right eye: No discharge  Left eye: No discharge  Conjunctiva/sclera: Conjunctivae normal    Neck:      Musculoskeletal: Normal range of motion  Thyroid: No thyromegaly  Cardiovascular:      Rate and Rhythm: Normal rate and regular rhythm  Heart sounds: Normal heart sounds  No murmur  No friction rub  No gallop  Pulmonary:      Effort: Pulmonary effort is normal  No respiratory distress  Breath sounds: Normal breath sounds  No wheezing or rales  Chest:      Chest wall: No tenderness  Abdominal:      General: Bowel sounds are normal  There is no distension  Palpations: Abdomen is soft  There is no mass  Tenderness: There is no abdominal tenderness  There is no guarding or rebound  Musculoskeletal: Normal range of motion  General: No swelling, tenderness or deformity  Lymphadenopathy:      Cervical: No cervical adenopathy  Neurological:      Mental Status: She is alert

## 2021-04-08 NOTE — PATIENT INSTRUCTIONS

## 2021-04-08 NOTE — ASSESSMENT & PLAN NOTE
PHQ-9 score 0 today  MARILY-7 score 13 today  Continue paxil-CR 37 5mg QD  Use xanax 0 5mg bid prn  SE educated pt

## 2021-04-08 NOTE — PROGRESS NOTES
[address] [facility]    NAME: Pamela Mclean  AGE: 50 y o  SEX: female  : 1975     DATE: 2021     Assessment and Plan:     Problem List Items Addressed This Visit        Endocrine    IFG (impaired fasting glucose) - Primary     Low carb diet  Will check hgA1C  Relevant Orders    Comprehensive metabolic panel    Hemoglobin A1C    Lipid Panel with Direct LDL reflex       Musculoskeletal and Integument    Psoriasis     FU dermatology  Continue humira  Other    Anxiety with depression     PHQ-9 score 0 today  MARILY-7 score 13 today  Continue paxil-CR 37 5mg QD  Use xanax 0 5mg bid prn  SE educated pt  Mixed hyperlipidemia     Low fat diet  Will check labs  Relevant Orders    Comprehensive metabolic panel    Hemoglobin A1C    Lipid Panel with Direct LDL reflex    Obesity (BMI 30-39  9)      Other Visit Diagnoses     Screening mammogram, encounter for        Relevant Orders    Mammo screening bilateral w cad    Annual physical exam        Relevant Orders    Comprehensive metabolic panel    Hemoglobin A1C    Lipid Panel with Direct LDL reflex          Immunizations and preventive care screenings were discussed with patient today  Appropriate education was printed on patient's after visit summary  Counseling:  Alcohol/drug use: discussed moderation in alcohol intake, the recommendations for healthy alcohol use, and avoidance of illicit drug use  Dental Health: discussed importance of regular tooth brushing, flossing, and dental visits  Injury prevention: discussed safety/seat belts, safety helmets, smoke detectors, carbon dioxide detectors, and smoking near bedding or upholstery  Sexual health: discussed sexually transmitted diseases, partner selection, use of condoms, avoidance of unintended pregnancy, and contraceptive alternatives    · Exercise: the importance of regular exercise/physical activity was discussed  Recommend exercise 3-5 times per week for at least 30 minutes  BMI Counseling: Body mass index is 35 78 kg/m²  The BMI is above normal  Nutrition recommendations include decreasing portion sizes, encouraging healthy choices of fruits and vegetables, decreasing fast food intake, consuming healthier snacks and limiting drinks that contain sugar  Exercise recommendations include moderate physical activity 150 minutes/week  No pharmacotherapy was ordered  Return in about 6 months (around 10/8/2021) for Recheck  Chief Complaint:     Chief Complaint   Patient presents with    Physical Exam     Annual Physical      History of Present Illness:     Adult Annual Physical   Patient here for a comprehensive physical exam  The patient reports see note  Diet and Physical Activity  · Diet/Nutrition: well balanced diet  · Exercise: moderate cardiovascular exercise  Depression Screening  PHQ-9 Depression Screening    PHQ-9:   Frequency of the following problems over the past two weeks:      Little interest or pleasure in doing things: 0 - not at all  Feeling down, depressed, or hopeless: 0 - not at all  Trouble falling or staying asleep, or sleeping too much: 0 - not at all  Feeling tired or having little energy: 0 - not at all  Poor appetite or overeatin - not at all  Feeling bad about yourself - or that you are a failure or have let yourself or your family down: 0 - not at all  Trouble concentrating on things, such as reading the newspaper or watching television: 0 - not at all  Moving or speaking so slowly that other people could have noticed  Or the opposite - being so fidgety or restless that you have been moving around a lot more than usual: 0 - not at all  Thoughts that you would be better off dead, or of hurting yourself in some way: 0 - not at all  PHQ-2 Score: 0  PHQ-9 Score: 0       General Health  · Sleep: sleeps well     · Hearing: normal - bilateral   · Vision: no vision problems  · Dental: regular dental visits  /GYN Health    · FU OBGYN  Due for pap  · Due for mammogram      Review of Systems:     Review of Systems   Constitutional: Negative for appetite change, chills and fever  HENT: Negative for congestion, ear pain, sinus pain and sore throat  Eyes: Negative for discharge and itching  Respiratory: Negative for apnea, cough, chest tightness, shortness of breath and wheezing  Cardiovascular: Negative for chest pain, palpitations and leg swelling  Gastrointestinal: Negative for abdominal pain, anal bleeding, constipation, diarrhea, nausea and vomiting  Endocrine: Negative for cold intolerance, heat intolerance and polyuria  Genitourinary: Negative for difficulty urinating and dysuria  Musculoskeletal: Negative for arthralgias, back pain and myalgias  Skin: Negative for rash  Neurological: Negative for dizziness and headaches  Psychiatric/Behavioral: Negative for agitation  Past Medical History:     Past Medical History:   Diagnosis Date    Anxiety     Chronic kidney disease     R kidney stone    Colitis     Last Assessed: 5/18/2017    Depression     Diarrhea     Elevated ALT measurement     Last Assessed: 4/12/2017    Infectious mononucleosis     Irritable bowel syndrome with diarrhea     Last Assessed: 3/15/2017    Liver lesion     Last Assessed: 4/3/2017    Microscopic colitis     Migraine     not for 3 years    Mucus in stool     Last Assessed: 3/15/2017    Obesity     Psoriasis     Varicella       Past Surgical History:     Past Surgical History:   Procedure Laterality Date    DENTAL SURGERY      tooth extraction    NO PAST SURGERIES      [place] COLONOSCOPY FLX DX W/COLLJ SPEC WHEN PFRMD N/A 5/18/2017    Procedure: EGD with bx AND COLONOSCOPY with bx;  Surgeon: Lucien Crowell MD;  Location: [facility];   Service: Gastroenterology      Social History:        Social History     Socioeconomic History    Marital status: /Civil [employer]     Spouse name: None    Number of children: None    Years of education: None    Highest education level: None   Occupational History    None   Social Needs    Financial resource strain: None    Food insecurity     Worry: None     Inability: None    Transportation needs     Medical: None     Non-medical: None   Tobacco Use    Smoking status: Former Smoker     Types: Cigarettes     Quit date: 2014     Years since quittin 8    Smokeless tobacco: Never Used   Substance and Sexual Activity    Alcohol use: Yes     Alcohol/week: 1 0 standard drinks     Types: 1 Glasses of wine per week     Frequency: Monthly or less     Drinks per session: 1 or 2     Binge frequency: Never     Comment: monthly    Drug use: No    Sexual activity: None   Lifestyle    Physical activity     Days per week: None     Minutes per session: None    Stress: None   Relationships    Social connections     Talks on phone: None     Gets together: None     Attends Congregational service: None     Active member of club or organization: None     Attends meetings of clubs or organizations: None     Relationship status: None    Intimate partner violence     Fear of current or ex partner: None     Emotionally abused: None     Physically abused: None     Forced sexual activity: None   Other Topics Concern    None   Social History Narrative    None      Family History:     Family History   Problem Relation Age of Onset    Hypertension Mother     Hypothyroidism Mother     Hypertension Father     Stroke Paternal Grandmother         Syndrome    Cancer Paternal Grandfather       Current Medications:     Current Outpatient Medications   Medication Sig Dispense Refill    ALPRAZolam (XANAX) 0 5 mg tablet TAKE 1 TABLET (0 5 MG TOTAL) BY MOUTH 2 (TWO) TIMES A DAY AS NEEDED FOR ANXIETY   60 tablet 0    calcium carbonate (OS-WALDO) 1250 (500 Ca) MG tablet Take 1 tablet by mouth daily      cholecalciferol (VITAMIN D3) 1,000 units tablet Take 1,000 Units by mouth daily      clobetasol (TEMOVATE) 0 05 % external solution APPLY TO SCALP TWICE DAILY  2    fluticasone (FLONASE) 50 mcg/act nasal spray 2 sprays into each nostril daily 16 g 0    HUMIRA PEN-PSORIASIS STARTER 40 MG/0 8ML PNKT 40 mg every 14 (fourteen) days        PARoxetine (PAXIL-CR) 37 5 mg 24 hr tablet TAKE 1 TABLET (37 5 MG TOTAL) BY MOUTH EVERY MORNING 90 tablet 1    Probiotic Product (PROBIOTIC-10) CAPS Take 2 capsules by mouth daily      triamcinolone (KENALOG) 0 1 % cream APPLY TWICE A DAY  3     No current facility-administered medications for this visit  Allergies:     No Known Allergies   Physical Exam:     /74 (BP Location: Left arm, Patient Position: Sitting, Cuff Size: Large)   Pulse 66   Temp 98 5 °F (36 9 °C) (Tympanic)   Resp 14   Ht 5' 3" (1 6 m)   Wt 91 6 kg (202 lb)   SpO2 97%   BMI 35 78 kg/m²     Physical Exam  Vitals signs reviewed  Constitutional:       Appearance: Normal appearance  HENT:      Head: Normocephalic and atraumatic  Eyes:      General:         Right eye: No discharge  Left eye: No discharge  Conjunctiva/sclera: Conjunctivae normal    Neck:      Musculoskeletal: Normal range of motion and neck supple  No muscular tenderness  Vascular: No carotid bruit  Cardiovascular:      Rate and Rhythm: Normal rate and regular rhythm  Heart sounds: Normal heart sounds  No murmur  No friction rub  No gallop  Pulmonary:      Effort: Pulmonary effort is normal  No respiratory distress  Breath sounds: Normal breath sounds  No wheezing or rales  Abdominal:      General: Bowel sounds are normal  There is no distension  Palpations: Abdomen is soft  Tenderness: There is no abdominal tenderness  Musculoskeletal: Normal range of motion  General: No swelling, tenderness or deformity  Lymphadenopathy:      Cervical: No cervical adenopathy     Neurological:      Mental Status: She is alert    Psychiatric:         Mood and Affect: Mood normal           Regino Rodriguez MD  [address]

## 2021-04-09 ENCOUNTER — IMMUNIZATIONS (OUTPATIENT)
Dept: FAMILY MEDICINE CLINIC | Facility: HOSPITAL | Age: 46
End: 2021-04-09

## 2021-04-09 DIAGNOSIS — Z23 ENCOUNTER FOR IMMUNIZATION: Primary | ICD-10-CM

## 2021-04-09 PROCEDURE — 0002A SARS-COV-2 / COVID-19 MRNA VACCINE (PFIZER-BIONTECH) 30 MCG: CPT

## 2021-04-09 PROCEDURE — 91300 SARS-COV-2 / COVID-19 MRNA VACCINE (PFIZER-BIONTECH) 30 MCG: CPT

## 2021-04-25 DIAGNOSIS — F41.8 ANXIETY WITH DEPRESSION: ICD-10-CM

## 2021-04-26 RX ORDER — PAROXETINE HYDROCHLORIDE 37.5 MG/1
37.5 TABLET, FILM COATED, EXTENDED RELEASE ORAL EVERY MORNING
Qty: 90 TABLET | Refills: 1 | Status: SHIPPED | OUTPATIENT
Start: 2021-04-26 | End: 2021-10-21

## 2021-04-26 RX ORDER — ALPRAZOLAM 0.5 MG/1
0.5 TABLET ORAL 2 TIMES DAILY PRN
Qty: 60 TABLET | Refills: 0 | Status: SHIPPED | OUTPATIENT
Start: 2021-04-26 | End: 2021-06-01

## 2021-05-13 ENCOUNTER — TELEPHONE (OUTPATIENT)
Dept: UROLOGY | Facility: MEDICAL CENTER | Age: 46
End: 2021-05-13

## 2021-05-13 NOTE — TELEPHONE ENCOUNTER
Patient with a h/o kidney stones  Last CT 9/2020 demonstrated a 4 8 mm right lower pole stone  Called and spoke with patient  She reports increased urgency and voiding small amounts recently  She also notes right flank hip discomfort/ache  Not sharp stabbing pain  Denies hematuria/fever/chills but notes occasional intermittent nausea  No vomiting  ER precautions reviewed for fever/chills/severe pain/n/v  Will review with provider for further recommendations

## 2021-05-13 NOTE — TELEPHONE ENCOUNTER
Patient of Dami Gordon seen in the [facility] office  Patient called and advised that she is having slight flank and hip pain  She also advised that she is having urgency, nausea  Patient would like to know what she can do  Please advise

## 2021-05-13 NOTE — TELEPHONE ENCOUNTER
Called Ronda back and relayed Yasmany Kaye' thoughts that she should have KUB and u/s completed  Gave her number for Central scheduling for the U/S and that the KUB was a walk in  She understood

## 2021-05-13 NOTE — TELEPHONE ENCOUNTER
I would encourage patient to complete imaging studies which are ordered in the system for her: KUB xray and Renal US  These orders were placed in March 2021  Please assist patient with scheduling if needed  Agree with ER precautions and tylenol/motrin for pain control, increase fluid intake with water, heating pad for discomfort

## 2021-05-31 DIAGNOSIS — F41.8 ANXIETY WITH DEPRESSION: ICD-10-CM

## 2021-06-01 RX ORDER — ALPRAZOLAM 0.5 MG/1
0.5 TABLET ORAL 2 TIMES DAILY PRN
Qty: 60 TABLET | Refills: 0 | Status: SHIPPED | OUTPATIENT
Start: 2021-06-01 | End: 2021-07-01

## 2021-07-01 DIAGNOSIS — F41.8 ANXIETY WITH DEPRESSION: ICD-10-CM

## 2021-07-01 RX ORDER — ALPRAZOLAM 0.5 MG/1
0.5 TABLET ORAL 2 TIMES DAILY PRN
Qty: 60 TABLET | Refills: 0 | Status: SHIPPED | OUTPATIENT
Start: 2021-07-01 | End: 2021-07-30

## 2021-07-29 DIAGNOSIS — F41.8 ANXIETY WITH DEPRESSION: ICD-10-CM

## 2021-07-30 RX ORDER — ALPRAZOLAM 0.5 MG/1
0.5 TABLET ORAL 2 TIMES DAILY PRN
Qty: 60 TABLET | Refills: 0 | Status: SHIPPED | OUTPATIENT
Start: 2021-07-30 | End: 2021-08-31

## 2021-08-24 ENCOUNTER — APPOINTMENT (OUTPATIENT)
Dept: LAB | Facility: HOSPITAL | Age: 46
End: 2021-08-24
Payer: COMMERCIAL

## 2021-08-24 DIAGNOSIS — Z79.899 ENCOUNTER FOR LONG-TERM (CURRENT) USE OF OTHER MEDICATIONS: ICD-10-CM

## 2021-08-24 DIAGNOSIS — E78.2 MIXED HYPERLIPIDEMIA: ICD-10-CM

## 2021-08-24 DIAGNOSIS — Z00.00 ANNUAL PHYSICAL EXAM: ICD-10-CM

## 2021-08-24 DIAGNOSIS — R73.01 IFG (IMPAIRED FASTING GLUCOSE): ICD-10-CM

## 2021-08-24 LAB
ALBUMIN SERPL BCP-MCNC: 3.6 G/DL (ref 3.5–5)
ALP SERPL-CCNC: 52 U/L (ref 46–116)
ALT SERPL W P-5'-P-CCNC: 26 U/L (ref 12–78)
ANION GAP SERPL CALCULATED.3IONS-SCNC: 0 MMOL/L (ref 4–13)
AST SERPL W P-5'-P-CCNC: 16 U/L (ref 5–45)
BASOPHILS # BLD AUTO: 0.12 THOUSANDS/ΜL (ref 0–0.1)
BASOPHILS NFR BLD AUTO: 1 % (ref 0–1)
BILIRUB SERPL-MCNC: 0.44 MG/DL (ref 0.2–1)
BUN SERPL-MCNC: 10 MG/DL (ref 5–25)
CALCIUM SERPL-MCNC: 8.7 MG/DL (ref 8.3–10.1)
CHLORIDE SERPL-SCNC: 104 MMOL/L (ref 100–108)
CHOLEST SERPL-MCNC: 202 MG/DL (ref 50–200)
CO2 SERPL-SCNC: 29 MMOL/L (ref 21–32)
CREAT SERPL-MCNC: 0.74 MG/DL (ref 0.6–1.3)
EOSINOPHIL # BLD AUTO: 0.89 THOUSAND/ΜL (ref 0–0.61)
EOSINOPHIL NFR BLD AUTO: 11 % (ref 0–6)
ERYTHROCYTE [DISTWIDTH] IN BLOOD BY AUTOMATED COUNT: 12.5 % (ref 11.6–15.1)
EST. AVERAGE GLUCOSE BLD GHB EST-MCNC: 108 MG/DL
GFR SERPL CREATININE-BSD FRML MDRD: 96 ML/MIN/1.73SQ M
GLUCOSE P FAST SERPL-MCNC: 97 MG/DL (ref 65–99)
HBA1C MFR BLD: 5.4 %
HCT VFR BLD AUTO: 42.4 % (ref 34.8–46.1)
HDLC SERPL-MCNC: 54 MG/DL
HGB BLD-MCNC: 13.8 G/DL (ref 11.5–15.4)
IMM GRANULOCYTES # BLD AUTO: 0.02 THOUSAND/UL (ref 0–0.2)
IMM GRANULOCYTES NFR BLD AUTO: 0 % (ref 0–2)
LDLC SERPL CALC-MCNC: 128 MG/DL (ref 0–100)
LYMPHOCYTES # BLD AUTO: 2.93 THOUSANDS/ΜL (ref 0.6–4.47)
LYMPHOCYTES NFR BLD AUTO: 35 % (ref 14–44)
MCH RBC QN AUTO: 29.6 PG (ref 26.8–34.3)
MCHC RBC AUTO-ENTMCNC: 32.5 G/DL (ref 31.4–37.4)
MCV RBC AUTO: 91 FL (ref 82–98)
MONOCYTES # BLD AUTO: 0.63 THOUSAND/ΜL (ref 0.17–1.22)
MONOCYTES NFR BLD AUTO: 8 % (ref 4–12)
NEUTROPHILS # BLD AUTO: 3.77 THOUSANDS/ΜL (ref 1.85–7.62)
NEUTS SEG NFR BLD AUTO: 45 % (ref 43–75)
NRBC BLD AUTO-RTO: 0 /100 WBCS
PLATELET # BLD AUTO: 298 THOUSANDS/UL (ref 149–390)
PMV BLD AUTO: 10.1 FL (ref 8.9–12.7)
POTASSIUM SERPL-SCNC: 4.3 MMOL/L (ref 3.5–5.3)
PROT SERPL-MCNC: 7.5 G/DL (ref 6.4–8.2)
RBC # BLD AUTO: 4.67 MILLION/UL (ref 3.81–5.12)
SODIUM SERPL-SCNC: 133 MMOL/L (ref 136–145)
TRIGL SERPL-MCNC: 98 MG/DL
WBC # BLD AUTO: 8.36 THOUSAND/UL (ref 4.31–10.16)

## 2021-08-24 PROCEDURE — 80061 LIPID PANEL: CPT

## 2021-08-24 PROCEDURE — 86480 TB TEST CELL IMMUN MEASURE: CPT

## 2021-08-24 PROCEDURE — 80053 COMPREHEN METABOLIC PANEL: CPT

## 2021-08-24 PROCEDURE — 83036 HEMOGLOBIN GLYCOSYLATED A1C: CPT

## 2021-08-24 PROCEDURE — 85025 COMPLETE CBC W/AUTO DIFF WBC: CPT

## 2021-08-24 PROCEDURE — 36415 COLL VENOUS BLD VENIPUNCTURE: CPT

## 2021-08-25 LAB
GAMMA INTERFERON BACKGROUND BLD IA-ACNC: 0.04 IU/ML
M TB IFN-G BLD-IMP: NEGATIVE
M TB IFN-G CD4+ BCKGRND COR BLD-ACNC: -0.01 IU/ML
M TB IFN-G CD4+ BCKGRND COR BLD-ACNC: 0 IU/ML
MITOGEN IGNF BCKGRD COR BLD-ACNC: >10 IU/ML

## 2021-08-31 DIAGNOSIS — F41.8 ANXIETY WITH DEPRESSION: ICD-10-CM

## 2021-08-31 RX ORDER — ALPRAZOLAM 0.5 MG/1
TABLET ORAL
Qty: 60 TABLET | Refills: 0 | Status: SHIPPED | OUTPATIENT
Start: 2021-08-31 | End: 2021-10-05

## 2021-10-05 DIAGNOSIS — F41.8 ANXIETY WITH DEPRESSION: ICD-10-CM

## 2021-10-05 RX ORDER — ALPRAZOLAM 0.5 MG/1
TABLET ORAL
Qty: 60 TABLET | Refills: 0 | Status: SHIPPED | OUTPATIENT
Start: 2021-10-05 | End: 2021-11-04

## 2021-10-08 ENCOUNTER — OFFICE VISIT (OUTPATIENT)
Dept: FAMILY MEDICINE CLINIC | Facility: CLINIC | Age: 46
End: 2021-10-08
Payer: COMMERCIAL

## 2021-10-08 VITALS
DIASTOLIC BLOOD PRESSURE: 88 MMHG | RESPIRATION RATE: 20 BRPM | SYSTOLIC BLOOD PRESSURE: 135 MMHG | WEIGHT: 204 LBS | HEIGHT: 63 IN | HEART RATE: 76 BPM | TEMPERATURE: 98.8 F | OXYGEN SATURATION: 98 % | BODY MASS INDEX: 36.14 KG/M2

## 2021-10-08 DIAGNOSIS — Z12.4 SCREENING FOR CERVICAL CANCER: ICD-10-CM

## 2021-10-08 DIAGNOSIS — R73.01 IFG (IMPAIRED FASTING GLUCOSE): ICD-10-CM

## 2021-10-08 DIAGNOSIS — L40.9 PSORIASIS: ICD-10-CM

## 2021-10-08 DIAGNOSIS — Z23 ENCOUNTER FOR IMMUNIZATION: ICD-10-CM

## 2021-10-08 DIAGNOSIS — E78.2 MIXED HYPERLIPIDEMIA: Primary | ICD-10-CM

## 2021-10-08 DIAGNOSIS — K52.839 MICROSCOPIC COLITIS, UNSPECIFIED MICROSCOPIC COLITIS TYPE: ICD-10-CM

## 2021-10-08 DIAGNOSIS — E66.9 OBESITY (BMI 30-39.9): ICD-10-CM

## 2021-10-08 DIAGNOSIS — F41.8 ANXIETY WITH DEPRESSION: ICD-10-CM

## 2021-10-08 PROCEDURE — 1036F TOBACCO NON-USER: CPT | Performed by: FAMILY MEDICINE

## 2021-10-08 PROCEDURE — 3008F BODY MASS INDEX DOCD: CPT | Performed by: FAMILY MEDICINE

## 2021-10-08 PROCEDURE — 90682 RIV4 VACC RECOMBINANT DNA IM: CPT

## 2021-10-08 PROCEDURE — 99214 OFFICE O/P EST MOD 30 MIN: CPT | Performed by: FAMILY MEDICINE

## 2021-10-08 PROCEDURE — 90471 IMMUNIZATION ADMIN: CPT

## 2021-10-21 DIAGNOSIS — F41.8 ANXIETY WITH DEPRESSION: ICD-10-CM

## 2021-10-21 RX ORDER — PAROXETINE HYDROCHLORIDE 37.5 MG/1
37.5 TABLET, FILM COATED, EXTENDED RELEASE ORAL EVERY MORNING
Qty: 90 TABLET | Refills: 1 | Status: SHIPPED | OUTPATIENT
Start: 2021-10-21 | End: 2022-02-22 | Stop reason: SDUPTHER

## 2021-11-04 DIAGNOSIS — F41.8 ANXIETY WITH DEPRESSION: ICD-10-CM

## 2021-11-04 RX ORDER — ALPRAZOLAM 0.5 MG/1
TABLET ORAL
Qty: 60 TABLET | Refills: 0 | Status: SHIPPED | OUTPATIENT
Start: 2021-11-04 | End: 2021-12-13

## 2021-12-06 ENCOUNTER — IMMUNIZATIONS (OUTPATIENT)
Dept: FAMILY MEDICINE CLINIC | Facility: HOSPITAL | Age: 46
End: 2021-12-06

## 2021-12-06 DIAGNOSIS — Z23 ENCOUNTER FOR IMMUNIZATION: Primary | ICD-10-CM

## 2021-12-06 PROCEDURE — 0001A COVID-19 PFIZER VACC 0.3 ML: CPT

## 2021-12-06 PROCEDURE — 91300 COVID-19 PFIZER VACC 0.3 ML: CPT

## 2021-12-12 DIAGNOSIS — F41.8 ANXIETY WITH DEPRESSION: ICD-10-CM

## 2021-12-13 RX ORDER — ALPRAZOLAM 0.5 MG/1
TABLET ORAL
Qty: 60 TABLET | Refills: 0 | Status: SHIPPED | OUTPATIENT
Start: 2021-12-13 | End: 2022-01-12

## 2022-01-11 DIAGNOSIS — F41.8 ANXIETY WITH DEPRESSION: ICD-10-CM

## 2022-01-12 RX ORDER — ALPRAZOLAM 0.5 MG/1
TABLET ORAL
Qty: 60 TABLET | Refills: 0 | Status: SHIPPED | OUTPATIENT
Start: 2022-01-12 | End: 2022-02-14

## 2022-02-13 DIAGNOSIS — F41.8 ANXIETY WITH DEPRESSION: ICD-10-CM

## 2022-02-14 RX ORDER — ALPRAZOLAM 0.5 MG/1
TABLET ORAL
Qty: 60 TABLET | Refills: 0 | Status: SHIPPED | OUTPATIENT
Start: 2022-02-14 | End: 2022-03-16
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
## 2022-03-16 DIAGNOSIS — F41.8 ANXIETY WITH DEPRESSION: ICD-10-CM

## 2022-03-16 RX ORDER — ALPRAZOLAM 0.5 MG/1
TABLET ORAL
Qty: 60 TABLET | Refills: 0 | Status: SHIPPED | OUTPATIENT
Start: 2022-03-16 | End: 2022-04-18

## 2022-03-29 ENCOUNTER — HOSPITAL ENCOUNTER (OUTPATIENT)
Dept: RADIOLOGY | Facility: HOSPITAL | Age: 47
Discharge: HOME | End: 2022-03-29
Payer: COMMERCIAL

## 2022-03-29 DIAGNOSIS — N20.0 RENAL CALCULUS, RIGHT: ICD-10-CM

## 2022-03-29 PROCEDURE — 76770 US EXAM ABDO BACK WALL COMP: CPT

## 2022-04-06 ENCOUNTER — OFFICE VISIT (OUTPATIENT)
Dept: UROLOGY | Facility: AMBULATORY SURGERY CENTER | Age: 47
End: 2022-04-06
Payer: COMMERCIAL

## 2022-04-06 VITALS
DIASTOLIC BLOOD PRESSURE: 80 MMHG | BODY MASS INDEX: 35.97 KG/M2 | WEIGHT: 203 LBS | HEART RATE: 80 BPM | SYSTOLIC BLOOD PRESSURE: 120 MMHG | HEIGHT: 63 IN

## 2022-04-06 DIAGNOSIS — N20.0 NEPHROLITHIASIS: Primary | ICD-10-CM

## 2022-04-06 PROCEDURE — 99213 OFFICE O/P EST LOW 20 MIN: CPT | Performed by: NURSE PRACTITIONER

## 2022-04-06 RX ORDER — SERTRALINE HYDROCHLORIDE 25 MG/1
TABLET, FILM COATED ORAL
COMMUNITY
Start: 2022-03-17

## 2022-04-06 NOTE — PROGRESS NOTES
4/6/2022    Assessment and Plan    48 y o  female managed by our office    1  Nephrolithiasis   · Ultrasound of kidney and bladder performed 03/29/2022 reveals a 6 mm nonobstructing right renal stone with no hydronephrosis noted  · Renal calculi quite stable with no change over many years  · Discussed dietary behavior modifications to reduce future stone formation  · Patient education provided regarding low oxalate/low-salt diet  · Patient will follow-up in the office and an as-needed basis  She is aware to call the office for concerns of flank pain, fever, chills, abdominal pain  · Patient will follow-up with PCP as need      History of Present Illness  Savannah Trujillo is a 48 y o  female here for follow up evaluation of  nephrolithiasis  Patient with an incidental finding of a 6 mm calculi approximately 1 year ago diagnostic imaging  She denies all lower urinary tract symptoms  She denies flank pain  She denies recurrent urinary tract infections  Patient denies changes to her general health since her last office evaluation 1 year ago  Review of Systems   Constitutional: Negative for chills and fever  Respiratory: Negative for cough and shortness of breath  Cardiovascular: Negative for chest pain  Gastrointestinal: Negative for abdominal distention, abdominal pain, blood in stool, nausea and vomiting  Genitourinary: Negative for difficulty urinating, dysuria, enuresis, flank pain, frequency, hematuria and urgency  Skin: Negative for rash                    Past Medical History  Past Medical History:   Diagnosis Date    Anxiety     Chronic kidney disease     R kidney stone    Colitis     Last Assessed: 5/18/2017    Depression     Diarrhea     Elevated ALT measurement     Last Assessed: 4/12/2017    Infectious mononucleosis     Irritable bowel syndrome with diarrhea     Last Assessed: 3/15/2017    Liver lesion     Last Assessed: 4/3/2017    Microscopic colitis     Migraine     not for 3 years    Mucus in stool     Last Assessed: 3/15/2017    Obesity     Psoriasis     Varicella        Past Social History  Past Surgical History:   Procedure Laterality Date    DENTAL SURGERY      tooth extraction    NO PAST SURGERIES      [place] COLONOSCOPY FLX DX W/COLLJ SPEC WHEN PFRMD N/A 2017    Procedure: EGD with bx AND COLONOSCOPY with bx;  Surgeon: Lord Donald MD;  Location: [facility]; Service: Gastroenterology     Social History     Tobacco Use   Smoking Status Former Smoker    Types: Cigarettes    Quit date: 2014    Years since quittin 8   Smokeless Tobacco Never Used       Past Family History  Family History   Problem Relation Age of Onset    Hypertension Mother     Hypothyroidism Mother     Hypertension Father     Stroke Paternal Grandmother         Syndrome    Cancer Paternal Grandfather        Past Social history  Social History     Socioeconomic History    Marital status: /Civil Union     Spouse name: Not on file    Number of children: Not on file    Years of education: Not on file    Highest education level: Not on file   Occupational History    Not on file   Tobacco Use    Smoking status: Former Smoker     Types: Cigarettes     Quit date: 2014     Years since quittin 8    Smokeless tobacco: Never Used   Vaping Use    Vaping Use: Never used   Substance and Sexual Activity    Alcohol use:  Yes     Alcohol/week: 1 0 standard drink     Types: 1 Glasses of wine per week     Comment: monthly    Drug use: No    Sexual activity: Not on file   Other Topics Concern    Not on file   Social History Narrative    Not on file     Social Determinants of Health     Financial Resource Strain: Not on file   Food Insecurity: Not on file   Transportation Needs: Not on file   Physical Activity: Not on file   Stress: Not on file   Social Connections: Not on file   Intimate Partner Violence: Not on file   Housing Stability: Not on file       Current Medications  Current Outpatient Medications   Medication Sig Dispense Refill    ALPRAZolam (XANAX) 0 5 mg tablet TAKE 1 TABLET BY MOUTH TWICE A DAY AS NEEDED FOR ANXIETY 60 tablet 0    calcium carbonate (OS-WALDO) 1250 (500 Ca) MG tablet Take 1 tablet by mouth daily      cholecalciferol (VITAMIN D3) 1,000 units tablet Take 1,000 Units by mouth daily      fluticasone (FLONASE) 50 mcg/act nasal spray 2 sprays into each nostril daily 16 g 0    HUMIRA PEN-PSORIASIS STARTER 40 MG/0 8ML PNKT 40 mg every 14 (fourteen) days        Probiotic Product (PROBIOTIC-10) CAPS Take 2 capsules by mouth daily      sertraline (ZOLOFT) 50 mg tablet TAKE 1 TAB BY MOUTH EVERY DAY UNTIL DIRECTED TO STOP  50 MG DAILY      clobetasol (TEMOVATE) 0 05 % external solution APPLY TO SCALP TWICE DAILY  2    sertraline (ZOLOFT) 25 mg tablet TAKE 1 TAB BY MOUTH EVERY DAY UNTIL DIRECTED TO STOP  25 MG DAILY FOR 7 DAYS      triamcinolone (KENALOG) 0 1 % cream APPLY TWICE A DAY (Patient not taking: Reported on 4/6/2022)  3     No current facility-administered medications for this visit  Allergies  No Known Allergies      The following portions of the patient's history were reviewed and updated as appropriate: allergies, current medications, past medical history, past social history, past surgical history and problem list       Vitals  Vitals:    04/06/22 1106   BP: 120/80   Pulse: 80   Weight: 92 1 kg (203 lb)   Height: 5' 3" (1 6 m)           Physical Exam  Physical Exam  Vitals reviewed  Constitutional:       General: She is not in acute distress  Appearance: Normal appearance  Cardiovascular:      Heart sounds: Normal heart sounds  Pulmonary:      Effort: Pulmonary effort is normal  No respiratory distress  Breath sounds: Normal breath sounds  Abdominal:      Tenderness: There is no right CVA tenderness or left CVA tenderness  Musculoskeletal:         General: Normal range of motion     Skin:     General: Skin is warm and dry  Neurological:      General: No focal deficit present  Mental Status: She is alert  Psychiatric:         Mood and Affect: Mood normal          Behavior: Behavior normal            Results  No results found for this or any previous visit (from the past 1 hour(s))  ]  No results found for: PSA  Lab Results   Component Value Date    CALCIUM 8 7 08/24/2021     03/07/2017    K 4 3 08/24/2021    CO2 29 08/24/2021     08/24/2021    BUN 10 08/24/2021    CREATININE 0 74 08/24/2021     Lab Results   Component Value Date    WBC 8 36 08/24/2021    HGB 13 8 08/24/2021    HCT 42 4 08/24/2021    MCV 91 08/24/2021     08/24/2021     Orders  No orders of the defined types were placed in this encounter        VIJAYA Devine

## 2022-04-06 NOTE — PATIENT INSTRUCTIONS
Call the office for cocnerns or questions     Kidney Stones   WHAT YOU NEED TO KNOW:   Kidney stones form in the urinary system when the water and waste in your urine are out of balance  When this happens, certain types of waste crystals separate from the urine  The crystals build up and form kidney stones  You may have more than one kidney stone  DISCHARGE INSTRUCTIONS:   Return to the emergency department if:   · You are vomiting and it is not relieved with medicine  Call your doctor or kidney specialist if:   · You have a fever  · You have trouble urinating  · You see blood in your urine  · You have severe pain  · You have any questions or concerns about your condition or care  Medicines: You may need any of the following:  · NSAIDs , such as ibuprofen, help decrease swelling, pain, and fever  This medicine is available with or without a doctor's order  NSAIDs can cause stomach bleeding or kidney problems in certain people  If you take blood thinner medicine, always ask your healthcare provider if NSAIDs are safe for you  Always read the medicine label and follow directions  · Acetaminophen  decreases pain and fever  It is available without a doctor's order  Ask how much to take and how often to take it  Follow directions  Read the labels of all other medicines you are using to see if they also contain acetaminophen, or ask your doctor or pharmacist  Acetaminophen can cause liver damage if not taken correctly  Do not use more than 4 grams (4,000 milligrams) total of acetaminophen in one day  · Prescription pain medicine  may be given  Ask your healthcare provider how to take this medicine safely  Some prescription pain medicines contain acetaminophen  Do not take other medicines that contain acetaminophen without talking to your healthcare provider  Too much acetaminophen may cause liver damage  Prescription pain medicine may cause constipation   Ask your healthcare provider how to prevent or treat constipation  · Medicines  to balance your electrolytes may be needed  · Take your medicine as directed  Contact your healthcare provider if you think your medicine is not helping or if you have side effects  Tell him or her if you are allergic to any medicine  Keep a list of the medicines, vitamins, and herbs you take  Include the amounts, and when and why you take them  Bring the list or the pill bottles to follow-up visits  Carry your medicine list with you in case of an emergency  What you can do to manage kidney stones:   · Drink more liquids  Your healthcare provider may tell you to drink at least 8 to 12 (eight-ounce) cups of liquids each day  This helps flush out the kidney stones when you urinate  Water is the best liquid to drink  · Strain your urine every time you go to the bathroom  Urinate through a strainer or a piece of thin cloth to catch the stones  Take the stones to your healthcare provider so they can be sent to the lab for tests  This will help your healthcare providers plan the best treatment for you  · Ask if you should avoid any foods  You may need to limit oxalate  Oxalate is a chemical found in some plant foods  The most common type of kidney stone is made up of crystals that contain calcium and oxalate  Your healthcare provider or dietitian may recommend that you limit oxalate if you get this type of kidney stone often  You may need to limit how much sodium (salt) or protein you eat  Ask for information about the best foods for you  · Be physically active as directed  Your stones may pass more easily if you stay active  Physical activity can also help you manage your weight  Ask about the best activities for you  After you pass the kidney stones: Your healthcare provider may  order a 24-hour urine test  Results from a 24-hour urine test will help your healthcare provider plan ways to prevent more stones from forming   Your healthcare provider will give you more instructions  Follow up with your doctor or kidney specialist as directed:  Write down your questions so you remember to ask them during your visits  © Copyright [employer] 2022 Information is for End User's use only and may not be sold, redistributed or otherwise used for commercial purposes  All illustrations and images included in CareNotes® are the copyrighted property of A D [employer] M [employer]  or Guadalupe Rios   The above information is an  only  It is not intended as medical advice for individual conditions or treatments  Talk to your doctor, nurse or pharmacist before following any medical regimen to see if it is safe and effective for you  Low Oxalate Diet   WHAT YOU NEED TO KNOW:   Oxalate is a chemical found in plant foods  You may need to eat foods that are low in oxalate to help clear kidney stones or prevent them from forming  People who have had kidney stones are at a higher risk of forming kidney stones again  The most common type of kidney stone is made up of crystals that contain calcium and oxalate  Your healthcare provider or dietitian may recommend that you limit oxalate if you get this type of kidney stone often  DISCHARGE INSTRUCTIONS:   Foods to include: Include the following foods that have a low to medium amount of oxalate  · Grains:      ? Egg noodles    ? Julio Cesar crackers    ? Pancakes and waffles    ? Cooked and dry cereals without nuts or bran    ? White or wild rice    ? White bread, cornbread, bagels, and white English muffins (medium oxalate)    ? Saltine or soda crackers and vanilla wafers (medium oxalate)    ? Brown rice, spaghetti, and other noodles and pastas (medium oxalate)    · Fruit:      ? Apples, bananas, grapes    ? Cranberries    ? Peaches, nectarines, apricots, and pears    ? Papayas and strawberries    ? Melons and pineapples    ?  Blackberries, blueberries, mangoes, and prunes (medium oxalate)    · Vegetables:      ? Artichokes, asparagus, and brussels sprouts    ? Broccoli and cauliflower    ? Kale, endive, cabbage, and lettuce    ? Cucumbers, peas, and zucchini    ? Mushrooms, onions, and peppers    ? Corn    ? Carrots, celery, and green beans (medium oxalate)    ? Parsnips, summer squash, tomatoes, and turnips (medium oxalate)    · Dairy:      ? American cheese, Swiss cheese, cottage cheese, ricotta cheese, and cheddar cheese    ? Milk and buttermilk    ? Yogurt    · Protein foods:      ? Meat, fish, shellfish, chicken, and turkey    ? Lunch meat and ham (medium oxalate)    ? Hot dogs, bratwurst, lundy, and sausage (medium oxalate)    · Drinks and desserts:      ? Coffee    ? Fruit punch and lemonade or limeade without added vitamin C    · Desserts:      ? Cookies, cakes, and ice cream    ? Pudding without chocolate    Foods to limit or avoid:  Limit the following foods that are high in oxalate  · Grains:      ? Wheat bran, wheat germ, and barley    ? Grits and bran cereal    ? White corn flour and buckwheat flour    ? Whole wheat bread    · Fruit:      ? Dried apricots    ? Red currants, figs, and rhubarb    ? Luis [place]    ? Grapefruit    · Vegetables:      ? Potatoes and yams    ? Tobi greens, leeks, okra, and spinach    ? Wax beans     ? Eggplant    ? Beets and beet greens    ? Swiss chard, escarole, parsley, and rutabagas    ? Tomato paste    · Protein foods:      ? Baked beans with tomato sauce    ? Nut butters and nuts (peanuts, almonds, pecans, cashews, hazelnuts)    ? Soy burgers    ? Miso    ? Dried beans    · Desserts:      ? Fruitcake    ? Chocolate    ? Carob and marmalade    · Beverages:      ? Chocolate drink mixes    ? Soy milk    ? Instant iced tea    · Other foods:      ? Sesame seeds and tahini (paste made of sesame seeds)    ? Poppy seeds    Other dietary guidelines:   · Drink about 12 to 16 (eight-ounce) cups of liquid each day    Liquids help clear kidney stones and prevent them from forming again  Water is the best liquid to drink  You may need more liquid if you are physically active  Ask your healthcare provider or dietitian how much liquid you need to drink each day  · Your healthcare provider may suggest that you make other diet changes to help prevent kidney stones  This may include decreasing the amount of sodium you eat each day  © Copyright [employer] 2022 Information is for End User's use only and may not be sold, redistributed or otherwise used for commercial purposes  All illustrations and images included in CareNotes® are the copyrighted property of [employer]  or [facility] Nalini Rios   The above information is an  only  It is not intended as medical advice for individual conditions or treatments  Talk to your doctor, nurse or pharmacist before following any medical regimen to see if it is safe and effective for you

## 2022-04-06 NOTE — TELEPHONE ENCOUNTER
Medication: ALPRAZOLAM 0 5 MG TABLET  PDMP please review  Active agreement on file -No Alert-The patient is alert, awake and responds to voice. The patient is oriented to time, place, and person. The triage nurse is able to obtain subjective information.

## 2022-04-11 ENCOUNTER — OFFICE VISIT (OUTPATIENT)
Dept: URGENT CARE | Age: 47
End: 2022-04-11
Payer: COMMERCIAL

## 2022-04-11 VITALS
TEMPERATURE: 97.7 F | SYSTOLIC BLOOD PRESSURE: 138 MMHG | OXYGEN SATURATION: 99 % | RESPIRATION RATE: 18 BRPM | DIASTOLIC BLOOD PRESSURE: 78 MMHG | HEART RATE: 62 BPM

## 2022-04-11 DIAGNOSIS — N39.0 URINARY TRACT INFECTION WITHOUT HEMATURIA, SITE UNSPECIFIED: Primary | ICD-10-CM

## 2022-04-11 LAB
SL AMB  POCT GLUCOSE, UA: NEGATIVE
SL AMB LEUKOCYTE ESTERASE,UA: ABNORMAL
SL AMB POCT BILIRUBIN,UA: NEGATIVE
SL AMB POCT BLOOD,UA: NEGATIVE
SL AMB POCT CLARITY,UA: CLEAR
SL AMB POCT COLOR,UA: YELLOW
SL AMB POCT KETONES,UA: NEGATIVE
SL AMB POCT NITRITE,UA: NEGATIVE
SL AMB POCT PH,UA: 6
SL AMB POCT SPECIFIC GRAVITY,UA: 1.03
SL AMB POCT URINE PROTEIN: ABNORMAL
SL AMB POCT UROBILINOGEN: 0.2

## 2022-04-11 PROCEDURE — 87086 URINE CULTURE/COLONY COUNT: CPT | Performed by: PHYSICIAN ASSISTANT

## 2022-04-11 PROCEDURE — 99213 OFFICE O/P EST LOW 20 MIN: CPT | Performed by: PHYSICIAN ASSISTANT

## 2022-04-11 PROCEDURE — 81002 URINALYSIS NONAUTO W/O SCOPE: CPT | Performed by: PHYSICIAN ASSISTANT

## 2022-04-11 RX ORDER — SULFAMETHOXAZOLE AND TRIMETHOPRIM 800; 160 MG/1; MG/1
1 TABLET ORAL EVERY 12 HOURS SCHEDULED
Qty: 10 TABLET | Refills: 0 | Status: SHIPPED | OUTPATIENT
Start: 2022-04-11 | End: 2022-04-16

## 2022-04-12 ENCOUNTER — APPOINTMENT (OUTPATIENT)
Dept: LAB | Facility: HOSPITAL | Age: 47
End: 2022-04-12
Payer: COMMERCIAL

## 2022-04-12 DIAGNOSIS — F41.8 ANXIETY WITH DEPRESSION: ICD-10-CM

## 2022-04-12 DIAGNOSIS — R73.01 IFG (IMPAIRED FASTING GLUCOSE): ICD-10-CM

## 2022-04-12 DIAGNOSIS — E78.2 MIXED HYPERLIPIDEMIA: ICD-10-CM

## 2022-04-12 LAB
ALBUMIN SERPL BCP-MCNC: 3.8 G/DL (ref 3.5–5)
ALP SERPL-CCNC: 58 U/L (ref 46–116)
ALT SERPL W P-5'-P-CCNC: 36 U/L (ref 12–78)
ANION GAP SERPL CALCULATED.3IONS-SCNC: 6 MMOL/L (ref 4–13)
AST SERPL W P-5'-P-CCNC: 24 U/L (ref 5–45)
BILIRUB SERPL-MCNC: 0.35 MG/DL (ref 0.2–1)
BUN SERPL-MCNC: 10 MG/DL (ref 5–25)
CALCIUM SERPL-MCNC: 9.1 MG/DL (ref 8.3–10.1)
CHLORIDE SERPL-SCNC: 103 MMOL/L (ref 100–108)
CHOLEST SERPL-MCNC: 203 MG/DL
CO2 SERPL-SCNC: 26 MMOL/L (ref 21–32)
CREAT SERPL-MCNC: 0.95 MG/DL (ref 0.6–1.3)
GFR SERPL CREATININE-BSD FRML MDRD: 71 ML/MIN/1.73SQ M
GLUCOSE P FAST SERPL-MCNC: 93 MG/DL (ref 65–99)
HDLC SERPL-MCNC: 46 MG/DL
LDLC SERPL CALC-MCNC: 134 MG/DL (ref 0–100)
NONHDLC SERPL-MCNC: 157 MG/DL
POTASSIUM SERPL-SCNC: 4.1 MMOL/L (ref 3.5–5.3)
PROT SERPL-MCNC: 7.5 G/DL (ref 6.4–8.2)
SODIUM SERPL-SCNC: 135 MMOL/L (ref 136–145)
TRIGL SERPL-MCNC: 116 MG/DL
TSH SERPL DL<=0.05 MIU/L-ACNC: 1.42 UIU/ML (ref 0.45–4.5)

## 2022-04-12 PROCEDURE — 84443 ASSAY THYROID STIM HORMONE: CPT

## 2022-04-12 PROCEDURE — 80053 COMPREHEN METABOLIC PANEL: CPT

## 2022-04-12 PROCEDURE — 36415 COLL VENOUS BLD VENIPUNCTURE: CPT

## 2022-04-12 PROCEDURE — 80061 LIPID PANEL: CPT

## 2022-04-12 NOTE — PROGRESS NOTES
[address] [facility]        NAME: Dorothy Corral is a 48 y o  female  : 1975    MRN: [id number]  DATE: 2022  TIME: 9:11 PM    Assessment and Plan   Urinary tract infection without hematuria, site unspecified [N39 0]  1  Urinary tract infection without hematuria, site unspecified  POCT urine dip    Urine culture    sulfamethoxazole-trimethoprim (BACTRIM DS) 800-160 mg per tablet         Patient Instructions       Follow up with PCP in 3-5 days  Proceed to  ER if symptoms worsen  Chief Complaint     Chief Complaint   Patient presents with    Possible UTI     pain, burning, frequency, urgency         History of Present Illness       Patient for evaluation of urinary burning, pressure, frequency  Patient's symptoms started yesterday  The patient with history of UTIs in the past this feels similar  She denies any fevers or chills  Difficulty Urinating   This is a new problem  The current episode started yesterday  The problem occurs intermittently  The problem has been gradually worsening  The quality of the pain is described as burning  The pain is at a severity of 2/10  There has been no fever  She is sexually active  There is no history of pyelonephritis  Associated symptoms include frequency and hesitancy  Pertinent negatives include no chills, discharge, flank pain, hematuria, nausea, possible pregnancy, sweats, urgency or vomiting  Review of Systems   Review of Systems   Constitutional: Negative  Negative for chills  Gastrointestinal: Negative  Negative for nausea and vomiting  Genitourinary: Positive for dysuria, frequency and hesitancy  Negative for decreased urine volume, difficulty urinating, flank pain, hematuria, pelvic pain, urgency and vaginal pain           Current Medications       Current Outpatient Medications:     ALPRAZolam (XANAX) 0 5 mg tablet, TAKE 1 TABLET BY MOUTH TWICE A DAY AS NEEDED FOR ANXIETY, Disp: 60 tablet, Rfl: 0    calcium carbonate (OS-WALDO) 1250 (500 Ca) MG tablet, Take 1 tablet by mouth daily, Disp: , Rfl:     cholecalciferol (VITAMIN D3) 1,000 units tablet, Take 1,000 Units by mouth daily, Disp: , Rfl:     clobetasol (TEMOVATE) 0 05 % external solution, APPLY TO SCALP TWICE DAILY, Disp: , Rfl: 2    fluticasone (FLONASE) 50 mcg/act nasal spray, 2 sprays into each nostril daily, Disp: 16 g, Rfl: 0    HUMIRA PEN-PSORIASIS STARTER 40 MG/0 8ML PNKT, 40 mg every 14 (fourteen) days  , Disp: , Rfl:     Probiotic Product (PROBIOTIC-10) CAPS, Take 2 capsules by mouth daily, Disp: , Rfl:     sertraline (ZOLOFT) 25 mg tablet, TAKE 1 TAB BY MOUTH EVERY DAY UNTIL DIRECTED TO STOP  25 MG DAILY FOR 7 DAYS, Disp: , Rfl:     sertraline (ZOLOFT) 50 mg tablet, TAKE 1 TAB BY MOUTH EVERY DAY UNTIL DIRECTED TO STOP   50 MG DAILY, Disp: , Rfl:     sulfamethoxazole-trimethoprim (BACTRIM DS) 800-160 mg per tablet, Take 1 tablet by mouth every 12 (twelve) hours for 5 days, Disp: 10 tablet, Rfl: 0    triamcinolone (KENALOG) 0 1 % cream, APPLY TWICE A DAY (Patient not taking: Reported on 4/6/2022), Disp: , Rfl: 3    Current Allergies     Allergies as of 04/11/2022    (No Known Allergies)            The following portions of the patient's history were reviewed and updated as appropriate: allergies, current medications, past family history, past medical history, past social history, past surgical history and problem list      Past Medical History:   Diagnosis Date    Anxiety     Chronic kidney disease     R kidney stone    Colitis     Last Assessed: 5/18/2017    Depression     Diarrhea     Elevated ALT measurement     Last Assessed: 4/12/2017    Infectious mononucleosis     Irritable bowel syndrome with diarrhea     Last Assessed: 3/15/2017    Liver lesion     Last Assessed: 4/3/2017    Microscopic colitis     Migraine     not for 3 years    Mucus in stool     Last Assessed: 3/15/2017    Obesity     Psoriasis     Varicella        Past Surgical History: Procedure Laterality Date    DENTAL SURGERY      tooth extraction    NO PAST SURGERIES      IL COLONOSCOPY FLX DX W/COLLJ SPEC WHEN PFRMD N/A 5/18/2017    Procedure: EGD with bx AND COLONOSCOPY with bx;  Surgeon: Corona Ponce MD;  Location: [facility]; Service: Gastroenterology       Family History   Problem Relation Age of Onset    Hypertension Mother     Hypothyroidism Mother     Hypertension Father     Stroke Paternal Grandmother         Syndrome    Cancer Paternal Grandfather          Medications have been verified  Objective   /78 (BP Location: Left arm, Patient Position: Sitting, Cuff Size: Standard)   Pulse 62   Temp 97 7 °F (36 5 °C) (Temporal)   Resp 18   LMP  (Within Weeks)   SpO2 99%   No LMP recorded (within weeks)  Physical Exam     Physical Exam  Vitals and nursing note reviewed  Constitutional:       General: She is not in acute distress  Appearance: Normal appearance  She is well-developed  She is not ill-appearing, toxic-appearing or diaphoretic  HENT:      Head: Normocephalic and atraumatic  Abdominal:      General: Bowel sounds are normal  There is no distension  Palpations: Abdomen is soft  There is no mass  Tenderness: There is no abdominal tenderness  There is no right CVA tenderness, left CVA tenderness, guarding or rebound  Skin:     General: Skin is warm and dry  Findings: No rash  Neurological:      Mental Status: She is alert and oriented to person, place, and time  Psychiatric:         Behavior: Behavior normal          Thought Content:  Thought content normal          Judgment: Judgment normal

## 2022-04-13 ENCOUNTER — OFFICE VISIT (OUTPATIENT)
Dept: FAMILY MEDICINE CLINIC | Facility: CLINIC | Age: 47
End: 2022-04-13
Payer: COMMERCIAL

## 2022-04-13 VITALS
TEMPERATURE: 98.9 F | WEIGHT: 205.8 LBS | HEIGHT: 64 IN | OXYGEN SATURATION: 99 % | RESPIRATION RATE: 20 BRPM | HEART RATE: 64 BPM | DIASTOLIC BLOOD PRESSURE: 88 MMHG | BODY MASS INDEX: 35.13 KG/M2 | SYSTOLIC BLOOD PRESSURE: 130 MMHG

## 2022-04-13 DIAGNOSIS — L40.9 PSORIASIS: ICD-10-CM

## 2022-04-13 DIAGNOSIS — R73.01 IFG (IMPAIRED FASTING GLUCOSE): ICD-10-CM

## 2022-04-13 DIAGNOSIS — F41.8 ANXIETY WITH DEPRESSION: ICD-10-CM

## 2022-04-13 DIAGNOSIS — E78.2 MIXED HYPERLIPIDEMIA: Primary | ICD-10-CM

## 2022-04-13 DIAGNOSIS — K52.839 MICROSCOPIC COLITIS, UNSPECIFIED MICROSCOPIC COLITIS TYPE: ICD-10-CM

## 2022-04-13 DIAGNOSIS — Z00.00 ANNUAL PHYSICAL EXAM: ICD-10-CM

## 2022-04-13 LAB — BACTERIA UR CULT: NORMAL

## 2022-04-13 PROCEDURE — 1036F TOBACCO NON-USER: CPT | Performed by: FAMILY MEDICINE

## 2022-04-13 PROCEDURE — 99396 PREV VISIT EST AGE 40-64: CPT | Performed by: FAMILY MEDICINE

## 2022-04-13 PROCEDURE — 3008F BODY MASS INDEX DOCD: CPT | Performed by: FAMILY MEDICINE

## 2022-04-13 PROCEDURE — 3725F SCREEN DEPRESSION PERFORMED: CPT | Performed by: FAMILY MEDICINE

## 2022-04-13 PROCEDURE — 99214 OFFICE O/P EST MOD 30 MIN: CPT | Performed by: FAMILY MEDICINE

## 2022-04-13 NOTE — PROGRESS NOTES
Assessment/Plan:    Mixed hyperlipidemia  4/2022 Lipid stable  ASCVD 1 8% Low fat diet  IFG (impaired fasting glucose)  Low carb diet  Anxiety with depression  Stable  FU psychiatrist and therapy  Continue zoloft 50mg QD  Use xanax 0 5mg bid prn  SE educated pt  Psoriasis  Continue humira per dermatology  Microscopic colitis  Stable  [facility] GI      Reviewed lab in 4/2022  TSH normal  CMP ok  Lipid 203/116/46/134 stable    Flu shot yearly  Got Covid19 shots and booster  Due for pap  FU OBGYN on 4/28/2022  Mammogram scheduled in 5/2022  [facility] GI for colonoscopy  RTO in 6 months         Diagnoses and all orders for this visit:    Mixed hyperlipidemia  -     CBC; Future  -     Comprehensive metabolic panel; Future  -     Hemoglobin A1C; Future  -     Lipid panel; Future    IFG (impaired fasting glucose)  -     CBC; Future  -     Comprehensive metabolic panel; Future  -     Hemoglobin A1C; Future  -     Lipid panel; Future    Anxiety with depression    Psoriasis    Microscopic colitis, unspecified microscopic colitis type    Annual physical exam  -     CBC; Future  -     Comprehensive metabolic panel; Future  -     Hemoglobin A1C; Future  -     Lipid panel; Future          Subjective:      Patient ID: Marielle Steel is a 48 y o  female  HPI    Pt is here by herself      UTI 2 days ago  Went to urgent care and got bactrim which helped       Hyperlipidemia---Tried to eat healthy and exercise regularly       IFG---Tried to eat healthy  Anxiety/depression---Stable  FU psychiatrist now  FU therapy Q 2 weeks  She switched paxil 37 5mg QD to zoloft 50mg QD in last 3 weeks  She uses xanax 0 5mg bid prn       Psoriasis---FU dermatology Q 6 months  She is on humira every 2 weeks at home and creams which helped       Microscopic Colitis---FU GI  Stable now  Humira helped also per pt       No smoking  Social alcohol     NO drugs        The following portions of the patient's history were reviewed and updated as appropriate: allergies, current medications, past family history, past medical history, past social history, past surgical history and problem list     Review of Systems   Constitutional: Negative for appetite change, chills and fever  HENT: Negative for congestion, ear pain, sinus pain and sore throat  Eyes: Negative for discharge and itching  Respiratory: Negative for apnea, cough, chest tightness, shortness of breath and wheezing  Cardiovascular: Negative for chest pain, palpitations and leg swelling  Gastrointestinal: Negative for abdominal pain, anal bleeding, constipation, diarrhea, nausea and vomiting  Endocrine: Negative for cold intolerance, heat intolerance and polyuria  Genitourinary: Negative for difficulty urinating and dysuria  Musculoskeletal: Negative for arthralgias, back pain and myalgias  Skin: Negative for rash  Neurological: Negative for dizziness and headaches  Psychiatric/Behavioral: Negative for agitation  Objective:      /88 (BP Location: Left arm, Patient Position: Sitting, Cuff Size: Adult)   Pulse 64   Temp 98 9 °F (37 2 °C) (Tympanic)   Resp 20   Ht 5' 3 5" (1 613 m)   Wt 93 4 kg (205 lb 12 8 oz)   SpO2 99%   BMI 35 88 kg/m²          Physical Exam  Constitutional:       General: She is not in acute distress  Appearance: She is well-developed  HENT:      Head: Normocephalic  Eyes:      General:         Right eye: No discharge  Left eye: No discharge  Conjunctiva/sclera: Conjunctivae normal    Neck:      Thyroid: No thyromegaly  Cardiovascular:      Rate and Rhythm: Normal rate and regular rhythm  Heart sounds: Normal heart sounds  No murmur heard  No friction rub  No gallop  Pulmonary:      Effort: Pulmonary effort is normal  No respiratory distress  Breath sounds: Normal breath sounds  No wheezing or rales  Chest:      Chest wall: No tenderness     Abdominal:      General: Bowel sounds are normal  There is no distension  Palpations: Abdomen is soft  There is no mass  Tenderness: There is no abdominal tenderness  There is no guarding or rebound  Musculoskeletal:         General: No tenderness or deformity  Normal range of motion  Cervical back: Normal range of motion  Lymphadenopathy:      Cervical: No cervical adenopathy  Neurological:      Mental Status: She is alert

## 2022-04-13 NOTE — PROGRESS NOTES
[address] [facility]    NAME: Rodri Oglesby  AGE: 48 y o  SEX: female  : 1975     DATE: 2022     Assessment and Plan:     Problem List Items Addressed This Visit        Digestive    Microscopic colitis     Stable  FU GI  Endocrine    IFG (impaired fasting glucose)     Low carb diet  Relevant Orders    CBC    Comprehensive metabolic panel    Hemoglobin A1C    Lipid panel       Musculoskeletal and Integument    Psoriasis     Continue humira per dermatology  Other    Anxiety with depression     Stable  FU psychiatrist and therapy  Continue zoloft 50mg QD  Use xanax 0 5mg bid prn  SE educated pt  Mixed hyperlipidemia - Primary     2022 Lipid stable  ASCVD 1 8% Low fat diet  Relevant Orders    CBC    Comprehensive metabolic panel    Hemoglobin A1C    Lipid panel      Other Visit Diagnoses     Annual physical exam        Relevant Orders    CBC    Comprehensive metabolic panel    Hemoglobin A1C    Lipid panel          Immunizations and preventive care screenings were discussed with patient today  Appropriate education was printed on patient's after visit summary  Counseling:  Alcohol/drug use: discussed moderation in alcohol intake, the recommendations for healthy alcohol use, and avoidance of illicit drug use  Dental Health: discussed importance of regular tooth brushing, flossing, and dental visits  Injury prevention: discussed safety/seat belts, safety helmets, smoke detectors, carbon dioxide detectors, and smoking near bedding or upholstery  Sexual health: discussed sexually transmitted diseases, partner selection, use of condoms, avoidance of unintended pregnancy, and contraceptive alternatives  · Exercise: the importance of regular exercise/physical activity was discussed  Recommend exercise 3-5 times per week for at least 30 minutes  BMI Counseling:  Body mass index is 35 88 kg/m²  The BMI is above normal  Nutrition recommendations include decreasing portion sizes, encouraging healthy choices of fruits and vegetables, decreasing fast food intake, consuming healthier snacks and limiting drinks that contain sugar  Exercise recommendations include moderate physical activity 150 minutes/week  No pharmacotherapy was ordered  Rationale for BMI follow-up plan is due to patient being overweight or obese  No follow-ups on file  Chief Complaint:     Chief Complaint   Patient presents with    Physical Exam     Annual preventative and review labs  Health Maintenance   Topic Date Due    Pneumococcal Vaccine: Pediatrics (0 to 5 Years) and At-Risk Patients (6 to 59 Years) (1 of 4 - PCV13) Never done    HIV Screening  Never done    DTaP,Tdap,and Td Vaccines (1 - Tdap) Never done    Cervical Cancer Screening  Never done    PT PLAN OF CARE  10/14/2020    BMI: Followup Plan  04/08/2022    Annual Physical  04/08/2022    COVID-19 Vaccine (4 - Booster for Pfizer series) 05/06/2022    BMI: Adult  04/13/2023    Depression Remission PHQ  04/13/2023    Colorectal Cancer Screening  05/18/2027    Hepatitis C Screening  Completed    Influenza Vaccine  Completed    HIB Vaccine  Aged Out    Hepatitis B Vaccine  Aged Out    IPV Vaccine  Aged Out    Hepatitis A Vaccine  Aged Out    Meningococcal ACWY Vaccine  Aged Out    HPV Vaccine  Aged Out      History of Present Illness:     Adult Annual Physical   Patient here for a comprehensive physical exam  The patient reports see note  Diet and Physical Activity  · Diet/Nutrition: well balanced diet  · Exercise: walking        Depression Screening  PHQ-2/9 Depression Screening    Little interest or pleasure in doing things: 1 - several days  Feeling down, depressed, or hopeless: 1 - several days  Trouble falling or staying asleep, or sleeping too much: 1 - several days  Feeling tired or having little energy: 2 - more than half the days  Poor appetite or overeatin - more than half the days  Feeling bad about yourself - or that you are a failure or have let yourself or your family down: 2 - more than half the days  Trouble concentrating on things, such as reading the newspaper or watching television: 1 - several days  Moving or speaking so slowly that other people could have noticed  Or the opposite - being so fidgety or restless that you have been moving around a lot more than usual: 1 - several days  Thoughts that you would be better off dead, or of hurting yourself in some way: 0 - not at all  PHQ-9 Score: 11   PHQ-9 Interpretation: Moderate depression        General Health  · Sleep: sleeps well  · Hearing: normal - bilateral   · Vision: no vision problems  · Dental: regular dental visits  /GYN Health  · Patient is: premenopausal  · Last menstrual period: 3/23/2022  · FU OBGYN  Review of Systems:     Review of Systems   Constitutional: Negative for appetite change, chills and fever  HENT: Negative for congestion, ear pain, sinus pain and sore throat  Eyes: Negative for discharge and itching  Respiratory: Negative for apnea, cough, chest tightness, shortness of breath and wheezing  Cardiovascular: Negative for chest pain, palpitations and leg swelling  Gastrointestinal: Negative for abdominal pain, anal bleeding, constipation, diarrhea, nausea and vomiting  Endocrine: Negative for cold intolerance, heat intolerance and polyuria  Genitourinary: Negative for difficulty urinating and dysuria  Musculoskeletal: Negative for arthralgias, back pain and myalgias  Skin: Negative for rash  Neurological: Negative for dizziness and headaches  Psychiatric/Behavioral: Negative for agitation        Past Medical History:     Past Medical History:   Diagnosis Date    Anxiety     Chronic kidney disease     R kidney stone    Colitis     Last Assessed: 2017    Depression     Diarrhea     Elevated ALT measurement     Last Assessed: 2017    Infectious mononucleosis     Irritable bowel syndrome with diarrhea     Last Assessed: 3/15/2017    Liver lesion     Last Assessed: 4/3/2017    Microscopic colitis     Migraine     not for 3 years    Mucus in stool     Last Assessed: 3/15/2017    Obesity     Psoriasis     Varicella       Past Surgical History:     Past Surgical History:   Procedure Laterality Date    DENTAL SURGERY      tooth extraction    NO PAST SURGERIES      NE COLONOSCOPY FLX DX W/COLLJ SPEC WHEN PFRMD N/A 2017    Procedure: EGD with bx AND COLONOSCOPY with bx;  Surgeon: Brooklyn Fabian MD;  Location: [facility]; Service: Gastroenterology      Social History:     Social History     Socioeconomic History    Marital status: /Civil Union     Spouse name: None    Number of children: None    Years of education: None    Highest education level: None   Occupational History    None   Tobacco Use    Smoking status: Former Smoker     Types: Cigarettes     Quit date: 2014     Years since quittin 8    Smokeless tobacco: Never Used   Vaping Use    Vaping Use: Never used   Substance and Sexual Activity    Alcohol use:  Yes     Alcohol/week: 1 0 standard drink     Types: 1 Glasses of wine per week     Comment: monthly    Drug use: No    Sexual activity: None   Other Topics Concern    None   Social History Narrative    None     Social Determinants of Health     Financial Resource Strain: Not on file   Food Insecurity: Not on file   Transportation Needs: Not on file   Physical Activity: Not on file   Stress: Not on file   Social Connections: Not on file   Intimate Partner Violence: Not on file   Housing Stability: Not on file      Family History:     Family History   Problem Relation Age of Onset    Hypertension Mother     Hypothyroidism Mother     Hypertension Father     Stroke Paternal Grandmother         Syndrome    Cancer Paternal Grandfather       Current Medications:     Current Outpatient Medications   Medication Sig Dispense Refill    ALPRAZolam (XANAX) 0 5 mg tablet TAKE 1 TABLET BY MOUTH TWICE A DAY AS NEEDED FOR ANXIETY 60 tablet 0    calcium carbonate (OS-WALDO) 1250 (500 Ca) MG tablet Take 1 tablet by mouth daily      cholecalciferol (VITAMIN D3) 1,000 units tablet Take 1,000 Units by mouth daily      clobetasol (TEMOVATE) 0 05 % external solution APPLY TO SCALP TWICE DAILY  2    fluticasone (FLONASE) 50 mcg/act nasal spray 2 sprays into each nostril daily 16 g 0    HUMIRA PEN-PSORIASIS STARTER 40 MG/0 8ML PNKT 40 mg every 14 (fourteen) days        Probiotic Product (PROBIOTIC-10) CAPS Take 2 capsules by mouth daily      sertraline (ZOLOFT) 25 mg tablet TAKE 1 TAB BY MOUTH EVERY DAY UNTIL DIRECTED TO STOP  25 MG DAILY FOR 7 DAYS      sertraline (ZOLOFT) 50 mg tablet TAKE 1 TAB BY MOUTH EVERY DAY UNTIL DIRECTED TO STOP  50 MG DAILY      sulfamethoxazole-trimethoprim (BACTRIM DS) 800-160 mg per tablet Take 1 tablet by mouth every 12 (twelve) hours for 5 days 10 tablet 0    triamcinolone (KENALOG) 0 1 % cream APPLY TWICE A DAY (Patient not taking: Reported on 4/6/2022)  3     No current facility-administered medications for this visit  Allergies:     No Known Allergies   Physical Exam:     /88 (BP Location: Left arm, Patient Position: Sitting, Cuff Size: Adult)   Pulse 64   Temp 98 9 °F (37 2 °C) (Tympanic)   Resp 20   Ht 5' 3 5" (1 613 m)   Wt 93 4 kg (205 lb 12 8 oz)   SpO2 99%   BMI 35 88 kg/m²     Physical Exam  Vitals reviewed  Constitutional:       Appearance: Normal appearance  HENT:      Head: Normocephalic and atraumatic  Eyes:      General:         Right eye: No discharge  Left eye: No discharge  Conjunctiva/sclera: Conjunctivae normal    Neck:      Vascular: No carotid bruit  Cardiovascular:      Rate and Rhythm: Normal rate and regular rhythm  Heart sounds: Normal heart sounds  No murmur heard    No friction rub  No gallop  Pulmonary:      Effort: Pulmonary effort is normal  No respiratory distress  Breath sounds: Normal breath sounds  No wheezing or rales  Abdominal:      General: Bowel sounds are normal  There is no distension  Palpations: Abdomen is soft  Tenderness: There is no abdominal tenderness  Musculoskeletal:         General: No swelling, tenderness or deformity  Normal range of motion  Cervical back: Normal range of motion and neck supple  No muscular tenderness  Lymphadenopathy:      Cervical: No cervical adenopathy  Neurological:      Mental Status: She is alert     Psychiatric:         Mood and Affect: Mood normal           Daniel Simms MD  [address] [place] [address]

## 2022-04-13 NOTE — ASSESSMENT & PLAN NOTE
Stable  FU psychiatrist and therapy  Continue zoloft 50mg QD  Use xanax 0 5mg bid prn  SE educated pt

## 2022-04-15 ENCOUNTER — TELEPHONE (OUTPATIENT)
Dept: UROLOGY | Facility: MEDICAL CENTER | Age: 47
End: 2022-04-15

## 2022-04-15 DIAGNOSIS — N20.0 NEPHROLITHIASIS: Primary | ICD-10-CM

## 2022-04-15 NOTE — TELEPHONE ENCOUNTER
Called and spoke with patient  Advised of Osbaldo's note  Patient stated she will call for an appointment in January and have KUB done ptv

## 2022-04-15 NOTE — TELEPHONE ENCOUNTER
Dr Yvette Ray would like for patient to have a KUB performed in 1 year   Patient can follow up with him afterwards

## 2022-04-17 DIAGNOSIS — F41.8 ANXIETY WITH DEPRESSION: ICD-10-CM

## 2022-04-18 RX ORDER — ALPRAZOLAM 0.5 MG/1
TABLET ORAL
Qty: 60 TABLET | Refills: 0 | Status: SHIPPED | OUTPATIENT
Start: 2022-04-18 | End: 2022-05-23

## 2022-05-05 ENCOUNTER — HOSPITAL ENCOUNTER (OUTPATIENT)
Dept: RADIOLOGY | Age: 47
Discharge: HOME | End: 2022-05-05
Payer: COMMERCIAL

## 2022-05-05 VITALS — HEIGHT: 64 IN | WEIGHT: 205 LBS | BODY MASS INDEX: 35 KG/M2

## 2022-05-05 DIAGNOSIS — Z12.31 SCREENING MAMMOGRAM, ENCOUNTER FOR: ICD-10-CM

## 2022-05-05 PROCEDURE — 77063 BREAST TOMOSYNTHESIS BI: CPT

## 2022-05-05 PROCEDURE — 77067 SCR MAMMO BI INCL CAD: CPT

## 2022-05-20 DIAGNOSIS — F41.8 ANXIETY WITH DEPRESSION: ICD-10-CM

## 2022-05-23 RX ORDER — ALPRAZOLAM 0.5 MG/1
TABLET ORAL
Qty: 60 TABLET | Refills: 0 | Status: SHIPPED | OUTPATIENT
Start: 2022-05-23 | End: 2022-06-24

## 2022-06-23 DIAGNOSIS — F41.8 ANXIETY WITH DEPRESSION: ICD-10-CM

## 2022-06-24 RX ORDER — ALPRAZOLAM 0.5 MG/1
TABLET ORAL
Qty: 60 TABLET | Refills: 0 | Status: SHIPPED | OUTPATIENT
Start: 2022-06-24 | End: 2022-07-26

## 2022-07-25 DIAGNOSIS — F41.8 ANXIETY WITH DEPRESSION: ICD-10-CM

## 2022-07-26 RX ORDER — ALPRAZOLAM 0.5 MG/1
TABLET ORAL
Qty: 60 TABLET | Refills: 0 | Status: SHIPPED | OUTPATIENT
Start: 2022-07-26 | End: 2022-09-02

## 2022-09-01 DIAGNOSIS — F41.8 ANXIETY WITH DEPRESSION: ICD-10-CM

## 2022-09-02 RX ORDER — ALPRAZOLAM 0.5 MG/1
TABLET ORAL
Qty: 60 TABLET | Refills: 0 | Status: SHIPPED | OUTPATIENT
Start: 2022-09-02 | End: 2022-10-05

## 2022-10-03 DIAGNOSIS — F41.8 ANXIETY WITH DEPRESSION: ICD-10-CM

## 2022-10-04 NOTE — TELEPHONE ENCOUNTER
Medication:Xanax 0 5mgs  Medication failed [facility] protocol  Please forward to your office staff for further review as this medication was reviewed by a [facility] RN

## 2022-10-05 RX ORDER — ALPRAZOLAM 0.5 MG/1
TABLET ORAL
Qty: 60 TABLET | Refills: 0 | Status: SHIPPED | OUTPATIENT
Start: 2022-10-05

## 2022-10-12 ENCOUNTER — APPOINTMENT (OUTPATIENT)
Dept: LAB | Facility: MEDICAL CENTER | Age: 47
End: 2022-10-12
Payer: COMMERCIAL

## 2022-10-12 DIAGNOSIS — Z00.00 ANNUAL PHYSICAL EXAM: ICD-10-CM

## 2022-10-12 DIAGNOSIS — E78.2 MIXED HYPERLIPIDEMIA: ICD-10-CM

## 2022-10-12 DIAGNOSIS — R73.01 IFG (IMPAIRED FASTING GLUCOSE): ICD-10-CM

## 2022-10-12 LAB
ALBUMIN SERPL BCP-MCNC: 4 G/DL (ref 3.5–5)
ALP SERPL-CCNC: 68 U/L (ref 46–116)
ALT SERPL W P-5'-P-CCNC: 28 U/L (ref 12–78)
ANION GAP SERPL CALCULATED.3IONS-SCNC: 4 MMOL/L (ref 4–13)
AST SERPL W P-5'-P-CCNC: 16 U/L (ref 5–45)
BILIRUB SERPL-MCNC: 0.6 MG/DL (ref 0.2–1)
BUN SERPL-MCNC: 8 MG/DL (ref 5–25)
CALCIUM SERPL-MCNC: 9.9 MG/DL (ref 8.3–10.1)
CHLORIDE SERPL-SCNC: 102 MMOL/L (ref 96–108)
CHOLEST SERPL-MCNC: 218 MG/DL
CO2 SERPL-SCNC: 28 MMOL/L (ref 21–32)
CREAT SERPL-MCNC: 0.79 MG/DL (ref 0.6–1.3)
ERYTHROCYTE [DISTWIDTH] IN BLOOD BY AUTOMATED COUNT: 12.2 % (ref 11.6–15.1)
EST. AVERAGE GLUCOSE BLD GHB EST-MCNC: 117 MG/DL
GFR SERPL CREATININE-BSD FRML MDRD: 88 ML/MIN/1.73SQ M
GLUCOSE P FAST SERPL-MCNC: 97 MG/DL (ref 65–99)
HBA1C MFR BLD: 5.7 %
HCT VFR BLD AUTO: 41.6 % (ref 34.8–46.1)
HDLC SERPL-MCNC: 47 MG/DL
HGB BLD-MCNC: 14 G/DL (ref 11.5–15.4)
LDLC SERPL CALC-MCNC: 141 MG/DL (ref 0–100)
MCH RBC QN AUTO: 29.8 PG (ref 26.8–34.3)
MCHC RBC AUTO-ENTMCNC: 33.7 G/DL (ref 31.4–37.4)
MCV RBC AUTO: 89 FL (ref 82–98)
NONHDLC SERPL-MCNC: 171 MG/DL
PLATELET # BLD AUTO: 358 THOUSANDS/UL (ref 149–390)
PMV BLD AUTO: 9.4 FL (ref 8.9–12.7)
POTASSIUM SERPL-SCNC: 4.4 MMOL/L (ref 3.5–5.3)
PROT SERPL-MCNC: 8.4 G/DL (ref 6.4–8.4)
RBC # BLD AUTO: 4.7 MILLION/UL (ref 3.81–5.12)
SODIUM SERPL-SCNC: 134 MMOL/L (ref 135–147)
TRIGL SERPL-MCNC: 150 MG/DL
WBC # BLD AUTO: 10.6 THOUSAND/UL (ref 4.31–10.16)

## 2022-10-12 PROCEDURE — 83036 HEMOGLOBIN GLYCOSYLATED A1C: CPT

## 2022-10-12 PROCEDURE — 80053 COMPREHEN METABOLIC PANEL: CPT

## 2022-10-12 PROCEDURE — 85027 COMPLETE CBC AUTOMATED: CPT

## 2022-10-12 PROCEDURE — 80061 LIPID PANEL: CPT

## 2022-10-12 PROCEDURE — 36415 COLL VENOUS BLD VENIPUNCTURE: CPT

## 2022-10-13 ENCOUNTER — TELEPHONE (OUTPATIENT)
Dept: PSYCHIATRY | Facility: CLINIC | Age: 47
End: 2022-10-13

## 2022-10-13 ENCOUNTER — OFFICE VISIT (OUTPATIENT)
Dept: FAMILY MEDICINE CLINIC | Facility: CLINIC | Age: 47
End: 2022-10-13
Payer: COMMERCIAL

## 2022-10-13 VITALS
TEMPERATURE: 98.9 F | HEART RATE: 80 BPM | SYSTOLIC BLOOD PRESSURE: 130 MMHG | DIASTOLIC BLOOD PRESSURE: 86 MMHG | WEIGHT: 204 LBS | BODY MASS INDEX: 34.83 KG/M2 | OXYGEN SATURATION: 97 % | RESPIRATION RATE: 16 BRPM | HEIGHT: 64 IN

## 2022-10-13 DIAGNOSIS — Z12.4 SCREENING FOR CERVICAL CANCER: ICD-10-CM

## 2022-10-13 DIAGNOSIS — L40.9 PSORIASIS: ICD-10-CM

## 2022-10-13 DIAGNOSIS — F32.4 MAJOR DEPRESSIVE DISORDER WITH SINGLE EPISODE, IN PARTIAL REMISSION (HCC): ICD-10-CM

## 2022-10-13 DIAGNOSIS — Z23 ENCOUNTER FOR IMMUNIZATION: ICD-10-CM

## 2022-10-13 DIAGNOSIS — E78.2 MIXED HYPERLIPIDEMIA: ICD-10-CM

## 2022-10-13 DIAGNOSIS — R73.01 IFG (IMPAIRED FASTING GLUCOSE): Primary | ICD-10-CM

## 2022-10-13 DIAGNOSIS — F41.8 ANXIETY WITH DEPRESSION: ICD-10-CM

## 2022-10-13 PROCEDURE — 90471 IMMUNIZATION ADMIN: CPT

## 2022-10-13 PROCEDURE — 90682 RIV4 VACC RECOMBINANT DNA IM: CPT

## 2022-10-13 PROCEDURE — 99214 OFFICE O/P EST MOD 30 MIN: CPT | Performed by: FAMILY MEDICINE

## 2022-10-13 RX ORDER — SERTRALINE HYDROCHLORIDE 100 MG/1
TABLET, FILM COATED ORAL
COMMUNITY
Start: 2022-04-25

## 2022-10-13 NOTE — ASSESSMENT & PLAN NOTE
Continue zoloft 100mg QD per psychiatrist  Refer to [facility] therapist

## 2022-10-13 NOTE — PROGRESS NOTES
Chief Complaint   Patient presents with   • Follow-up     6 months and review labs  Health Maintenance   Topic Date Due   • Pneumococcal Vaccine: Pediatrics (0 to 5 Years) and At-Risk Patients (6 to 59 Years) (1 - PCV) Never done   • HIV Screening  Never done   • DTaP,Tdap,and Td Vaccines (1 - Tdap) Never done   • Cervical Cancer Screening  Never done   • PT PLAN OF CARE  10/14/2020   • COVID-19 Vaccine (4 - Booster for Pfizer series) 03/06/2022   • BMI: Followup Plan  04/13/2023   • Annual Physical  04/13/2023   • Depression Remission PHQ  04/13/2023   • BMI: Adult  10/13/2023   • Colorectal Cancer Screening  05/18/2027   • Hepatitis C Screening  Completed   • Influenza Vaccine  Completed   • HIB Vaccine  Aged Out   • Hepatitis B Vaccine  Aged Out   • IPV Vaccine  Aged Out   • Hepatitis A Vaccine  Aged Out   • Meningococcal ACWY Vaccine  Aged Out   • HPV Vaccine  Aged Out           Assessment/Plan:    IFG (impaired fasting glucose)  10/2022 hgA1C 5 7 Advised pt to follow low carb diet  Mixed hyperlipidemia  10/2022 Lipid stable  Advised pt to follow low fat diet  Major depressive disorder with single episode, in partial remission ([facility] [address] )  Continue zoloft 100mg QD per psychiatrist  Refer to [facility] therapist      Psoriasis  Continue humira per dermatology  Reviewed lab in 10/2022  Lipid 218/150/47/141 stable  CBC showed WBC 10 6 slightly elevated  CMP showed Na 134 slightly low but stable  HgA1C 5 7 borderline    Flu shot yearly  Got Covid19 shots and booster  Due for pap  Will see OBGYN per pt  Mammogram 5/2022 normal    FU GI for colonoscopy  RTO in 6 months         Diagnoses and all orders for this visit:    IFG (impaired fasting glucose)  -     Comprehensive metabolic panel; Future  -     Hemoglobin A1C; Future  -     Lipid panel; Future    Mixed hyperlipidemia  -     Comprehensive metabolic panel; Future  -     Hemoglobin A1C; Future  -     Lipid panel;  Future    Major depressive disorder with single episode, in partial remission [facility])  -     Ambulatory Referral to [facility]; Future    Anxiety with depression  -     Comprehensive metabolic panel; Future  -     Hemoglobin A1C; Future  -     Lipid panel; Future    Psoriasis    Screening for cervical cancer  -     Ambulatory referral to Obstetrics / Gynecology; Future    Encounter for immunization  -     influenza vaccine, quadrivalent, recombinant, PF, 0 5 mL, for patients 18 yr+ (FLUBLOK)    Other orders  -     sertraline (ZOLOFT) 100 mg tablet          Subjective:      Patient ID: Adriana Araujo is a 50 y o  female  HPI    Pt is here by herself       Hyperlipidemia---Did not eat healthy and exercise recently because episodes of depression  She is better now  Will try harder on diet and exercise       IFG---Tried to eat healthy       Anxiety/depression---Stable  FU psychiatrist regularly  FU therapy Q 2 weeks online  Now her therapist left and she needs a new therapist     She is on zoloft 100mg QD which helped  She uses xanax 0 5mg bid prn       Psoriasis---FU dermatology Q 6 months  She is on humira every 2 weeks at home and creams which helped       Microscopic Colitis---FU GI yearly  Stable now  Humira helped also       No smoking    Rare alcohol        The following portions of the patient's history were reviewed and updated as appropriate: allergies, current medications, past family history, past medical history, past social history, past surgical history and problem list     Review of Systems   Constitutional: Negative for appetite change, chills and fever  HENT: Negative for congestion, ear pain, sinus pain and sore throat  Eyes: Negative for discharge and itching  Respiratory: Negative for apnea, cough, chest tightness, shortness of breath and wheezing  Cardiovascular: Negative for chest pain, palpitations and leg swelling     Gastrointestinal: Negative for abdominal pain, anal bleeding, constipation, diarrhea, nausea and vomiting  Endocrine: Negative for cold intolerance, heat intolerance and polyuria  Genitourinary: Negative for difficulty urinating and dysuria  Musculoskeletal: Negative for arthralgias, back pain and myalgias  Skin: Negative for rash  Neurological: Negative for dizziness and headaches  Psychiatric/Behavioral: Negative for agitation  Objective:      /86 (BP Location: Left arm, Patient Position: Sitting, Cuff Size: Adult)   Pulse 80   Temp 98 9 °F (37 2 °C) (Tympanic)   Resp 16   Ht 5' 3 5" (1 613 m)   Wt 92 5 kg (204 lb)   SpO2 97%   BMI 35 57 kg/m²          Physical Exam  Constitutional:       General: She is not in acute distress  Appearance: She is well-developed  HENT:      Head: Normocephalic  Eyes:      General:         Right eye: No discharge  Left eye: No discharge  Conjunctiva/sclera: Conjunctivae normal    Neck:      Thyroid: No thyromegaly  Cardiovascular:      Rate and Rhythm: Normal rate and regular rhythm  Heart sounds: Normal heart sounds  No murmur heard  No friction rub  No gallop  Pulmonary:      Effort: Pulmonary effort is normal  No respiratory distress  Breath sounds: Normal breath sounds  No wheezing or rales  Chest:      Chest wall: No tenderness  Abdominal:      General: Bowel sounds are normal  There is no distension  Palpations: Abdomen is soft  There is no mass  Tenderness: There is no abdominal tenderness  There is no guarding or rebound  Musculoskeletal:         General: No tenderness or deformity  Normal range of motion  Cervical back: Normal range of motion  Lymphadenopathy:      Cervical: No cervical adenopathy  Neurological:      Mental Status: She is alert

## 2022-11-09 DIAGNOSIS — F41.8 ANXIETY WITH DEPRESSION: ICD-10-CM

## 2022-11-10 RX ORDER — ALPRAZOLAM 0.5 MG/1
TABLET ORAL
Qty: 60 TABLET | Refills: 0 | Status: SHIPPED | OUTPATIENT
Start: 2022-11-10

## 2023-01-04 DIAGNOSIS — F41.8 ANXIETY WITH DEPRESSION: ICD-10-CM

## 2023-01-04 RX ORDER — ALPRAZOLAM 0.5 MG/1
TABLET ORAL
Qty: 60 TABLET | Refills: 0 | Status: SHIPPED | OUTPATIENT
Start: 2023-01-04

## 2023-01-04 NOTE — TELEPHONE ENCOUNTER
Medication:Xanax 0 5mgs  Medication failed [facility] protocol  Please forward to your office staff for further review as this medication was reviewed by a [facility] RN

## 2023-02-22 DIAGNOSIS — F41.8 ANXIETY WITH DEPRESSION: ICD-10-CM

## 2023-02-23 RX ORDER — ALPRAZOLAM 0.5 MG/1
TABLET ORAL
Qty: 60 TABLET | Refills: 0 | Status: SHIPPED | OUTPATIENT
Start: 2023-02-23

## 2024-02-21 PROBLEM — Z12.39 SCREENING FOR BREAST CANCER: Status: RESOLVED | Noted: 2019-09-24 | Resolved: 2024-02-21

## 2024-09-03 ENCOUNTER — TELEPHONE (OUTPATIENT)
Dept: FAMILY MEDICINE CLINIC | Facility: CLINIC | Age: 49
End: 2024-09-03

## 2024-09-06 NOTE — TELEPHONE ENCOUNTER
09/06/24 7:24 AM        The office's request has been received, reviewed, and the patient chart updated. The PCP has successfully been removed with a patient attribution note. This message will now be completed.        Thank you  John Mendoza

## 2024-09-13 ENCOUNTER — APPOINTMENT (OUTPATIENT)
Dept: LAB | Facility: MEDICAL CENTER | Age: 49
End: 2024-09-13
Payer: COMMERCIAL

## 2024-09-13 ENCOUNTER — OFFICE VISIT (OUTPATIENT)
Dept: URGENT CARE | Age: 49
End: 2024-09-13
Payer: COMMERCIAL

## 2024-09-13 ENCOUNTER — APPOINTMENT (OUTPATIENT)
Dept: RADIOLOGY | Age: 49
End: 2024-09-13
Payer: COMMERCIAL

## 2024-09-13 VITALS
TEMPERATURE: 97.2 F | OXYGEN SATURATION: 99 % | RESPIRATION RATE: 20 BRPM | SYSTOLIC BLOOD PRESSURE: 142 MMHG | HEART RATE: 71 BPM | DIASTOLIC BLOOD PRESSURE: 90 MMHG

## 2024-09-13 DIAGNOSIS — L40.0 PSORIASIS VULGARIS: ICD-10-CM

## 2024-09-13 DIAGNOSIS — S86.811A STRAIN OF CALF MUSCLE, RIGHT, INITIAL ENCOUNTER: ICD-10-CM

## 2024-09-13 DIAGNOSIS — S99.911A INJURY OF RIGHT ANKLE, INITIAL ENCOUNTER: Primary | ICD-10-CM

## 2024-09-13 DIAGNOSIS — Z79.899 ENCOUNTER FOR LONG-TERM (CURRENT) USE OF OTHER MEDICATIONS: ICD-10-CM

## 2024-09-13 DIAGNOSIS — S99.911A INJURY OF RIGHT ANKLE, INITIAL ENCOUNTER: ICD-10-CM

## 2024-09-13 LAB
ALBUMIN SERPL BCG-MCNC: 4.2 G/DL (ref 3.5–5)
ALP SERPL-CCNC: 89 U/L (ref 34–104)
ALT SERPL W P-5'-P-CCNC: 16 U/L (ref 7–52)
ANION GAP SERPL CALCULATED.3IONS-SCNC: 8 MMOL/L (ref 4–13)
AST SERPL W P-5'-P-CCNC: 18 U/L (ref 13–39)
BILIRUB DIRECT SERPL-MCNC: 0.04 MG/DL (ref 0–0.2)
BILIRUB SERPL-MCNC: 0.39 MG/DL (ref 0.2–1)
BUN SERPL-MCNC: 13 MG/DL (ref 5–25)
CALCIUM SERPL-MCNC: 9.2 MG/DL (ref 8.4–10.2)
CHLORIDE SERPL-SCNC: 103 MMOL/L (ref 96–108)
CO2 SERPL-SCNC: 26 MMOL/L (ref 21–32)
CREAT SERPL-MCNC: 0.78 MG/DL (ref 0.6–1.3)
ERYTHROCYTE [DISTWIDTH] IN BLOOD BY AUTOMATED COUNT: 12.5 % (ref 11.6–15.1)
GFR SERPL CREATININE-BSD FRML MDRD: 88 ML/MIN/1.73SQ M
GLUCOSE P FAST SERPL-MCNC: 103 MG/DL (ref 65–99)
HAV IGM SER QL: NORMAL
HBV CORE IGM SER QL: NORMAL
HBV SURFACE AG SER QL: NORMAL
HCT VFR BLD AUTO: 44.1 % (ref 34.8–46.1)
HCV AB SER QL: NORMAL
HGB BLD-MCNC: 14.6 G/DL (ref 11.5–15.4)
MCH RBC QN AUTO: 30 PG (ref 26.8–34.3)
MCHC RBC AUTO-ENTMCNC: 33.1 G/DL (ref 31.4–37.4)
MCV RBC AUTO: 91 FL (ref 82–98)
PLATELET # BLD AUTO: 328 THOUSANDS/UL (ref 149–390)
PMV BLD AUTO: 10 FL (ref 8.9–12.7)
POTASSIUM SERPL-SCNC: 4.2 MMOL/L (ref 3.5–5.3)
PROT SERPL-MCNC: 7.3 G/DL (ref 6.4–8.4)
RBC # BLD AUTO: 4.87 MILLION/UL (ref 3.81–5.12)
SODIUM SERPL-SCNC: 137 MMOL/L (ref 135–147)
WBC # BLD AUTO: 8.68 THOUSAND/UL (ref 4.31–10.16)

## 2024-09-13 PROCEDURE — 80053 COMPREHEN METABOLIC PANEL: CPT

## 2024-09-13 PROCEDURE — 82248 BILIRUBIN DIRECT: CPT

## 2024-09-13 PROCEDURE — 85027 COMPLETE CBC AUTOMATED: CPT

## 2024-09-13 PROCEDURE — 36415 COLL VENOUS BLD VENIPUNCTURE: CPT

## 2024-09-13 PROCEDURE — 73610 X-RAY EXAM OF ANKLE: CPT

## 2024-09-13 PROCEDURE — 80074 ACUTE HEPATITIS PANEL: CPT

## 2024-09-13 PROCEDURE — S9083 URGENT CARE CENTER GLOBAL: HCPCS

## 2024-09-13 PROCEDURE — 86480 TB TEST CELL IMMUN MEASURE: CPT

## 2024-09-13 PROCEDURE — G0382 LEV 3 HOSP TYPE B ED VISIT: HCPCS

## 2024-09-13 NOTE — PROGRESS NOTES
[facility]        NAME: Ronda Morin is a 49 y.o. female  : 1975    MRN: [id number]  DATE: 2024  TIME: 7:58 PM    Assessment and Plan   Injury of right ankle, initial encounter [S99.911A]  1. Injury of right ankle, initial encounter  XR ankle 3+ vw right      2. Strain of calf muscle, right, initial encounter        X-rays reviewed, no acute abnormality noted, awaiting official read.   Rest, ice, compress and elevate for pain/swelling.   Suspect acute strain of right calf muscle as a result of inversion injury to ankle, Well's score for DVT 0.   May apply heat, Biofreeze to calf, perform gentle stretches.   Follow up with PCP if no relief within one week.       Patient Instructions   Patient Education     Ankle Sprain ED   General Information   You came to the Emergency Department (ED) for an ankle sprain. This means you turned your ankle too far in one direction. Inside your ankle, you have tough bands of tissue called ligaments that hold the bones together. When you turned your ankle too far, one or more of these ligaments stretched or tore. Now your ankle is swollen and sore. You may have pain when you try to walk or move your foot.  You may be waiting on some test results. The staff will contact you if there are concerning results.  What care is needed at home?   Call your regular doctor to let them know you were in the ED. Make a follow-up appointment if you were told to.  Protect - To avoid making your injury worse, you can wrap it with an elastic bandage. Depending on how bad your sprain is, you might also get a brace or splint.  Rest - To rest the ankle, you can use crutches and stay off your feet. Avoid activities that cause pain.  Ice - Apply a cold gel pack, bag of ice, or bag of frozen vegetables on your ankle every 1 to 2 hours, for 15 minutes each time. Put a thin towel between the ice (or other cold object) and your skin. Use the ice (or other cold object) for at least 6  "hours after your injury. Some people find it helpful to ice longer, even up to 2 days after their injury.  Compression - Compression basically means pressure. You want to have your ankle under slight pressure by having it wrapped in an elastic \"compression\" bandage. This helps reduce swelling and supports the ankle. Your doctor or nurse will show you how to wrap your ankle. It's important that you do not use too much pressure and cut off the blood flow to your foot.  Elevation - \"Elevation\" means you should keep your foot raised up above the level of your heart. To do this, you can put your foot on some pillows or blankets while you are lying down, or on a table or chair while you are sitting.  You can also take medicines to relieve pain, such as acetaminophen, ibuprofen, or naproxen.  In a few days, when you have less swelling and pain, you can start to gently stretch your ankle.  When do I need to call the doctor?   The pain or swelling is getting worse.  Your toes are blue or gray and numb.  Your ankle feels more unstable or wobbly.  You have new or worsening symptoms.  Last Reviewed Date   2023-08-14  Consumer Information Use and Disclaimer   This generalized information is a limited summary of diagnosis, treatment, and/or medication information. It is not meant to be comprehensive and should be used as a tool to help the user understand and/or assess potential diagnostic and treatment options. It does NOT include all information about conditions, treatments, medications, side effects, or risks that may apply to a specific patient. It is not intended to be medical advice or a substitute for the medical advice, diagnosis, or treatment of a health care provider based on the health care provider's examination and assessment of a patient’s specific and unique circumstances. Patients must speak with a health care provider for complete information about their health, medical questions, and treatment options, including " any risks or benefits regarding use of medications. This information does not endorse any treatments or medications as safe, effective, or approved for treating a specific patient. [employer] and its affiliates disclaim any warranty or liability relating to this information or the use thereof. The use of this information is governed by the Terms of Use, available at https://www.sambaash.Asktourism/en/know/clinical-effectiveness-terms   Copyright   Copyright © 2024 [employer] and its affiliates and/or licensors. All rights reserved.    Follow up with PCP in 3-5 days.  Proceed to  ER if symptoms worsen.    Chief Complaint     Chief Complaint   Patient presents with    Ankle Injury    Foot Injury     Patient states she step off steps and twist right ankle and foot radiating up to leg ,  knee toes gets numb it happen today around 16:00 pm         History of Present Illness       Ankle Injury   The incident occurred 3 to 6 hours ago. The incident occurred at home. The injury mechanism was an inversion injury. The pain is present in the right ankle. The quality of the pain is described as aching. The pain is moderate. The pain has been Fluctuating since onset. Associated symptoms include an inability to bear weight and a loss of motion. Pertinent negatives include no loss of sensation, muscle weakness, numbness or tingling. Associated symptoms comments: Patient reports right calf pain after the incident. She reports no foreign bodies present. The symptoms are aggravated by movement and weight bearing. She has tried nothing for the symptoms. The treatment provided no relief.       Review of Systems   Review of Systems   Constitutional:  Negative for fatigue and fever.   HENT:  Negative for congestion, ear discharge, ear pain, postnasal drip, rhinorrhea, sinus pressure, sinus pain, sneezing and sore throat.    Eyes: Negative.  Negative for pain, discharge, redness and itching.   Respiratory: Negative.  Negative for  apnea, cough, choking, chest tightness, shortness of breath, wheezing and stridor.    Cardiovascular: Negative.  Negative for chest pain and palpitations.   Gastrointestinal: Negative.  Negative for diarrhea, nausea and vomiting.   Endocrine: Negative.  Negative for polydipsia, polyphagia and polyuria.   Genitourinary: Negative.  Negative for decreased urine volume and flank pain.   Musculoskeletal:  Positive for arthralgias and myalgias. Negative for back pain, gait problem, joint swelling, neck pain and neck stiffness.        Right calf tenderness   Skin: Negative.  Negative for color change and rash.   Allergic/Immunologic: Negative.  Negative for environmental allergies.   Neurological: Negative.  Negative for dizziness, tingling, facial asymmetry, light-headedness, numbness and headaches.   Hematological: Negative.  Negative for adenopathy.   Psychiatric/Behavioral: Negative.           Current Medications       Current Outpatient Medications:     ALPRAZolam (XANAX) 0.5 mg tablet, TAKE 1 TABLET BY MOUTH TWICE A DAY AS NEEDED FOR ANXIETY, Disp: 60 tablet, Rfl: 0    calcium carbonate (OS-WALDO) 1250 (500 Ca) MG tablet, Take 1 tablet by mouth daily, Disp: , Rfl:     cholecalciferol (VITAMIN D3) 1,000 units tablet, Take 1,000 Units by mouth daily, Disp: , Rfl:     clobetasol (TEMOVATE) 0.05 % external solution, APPLY TO SCALP TWICE DAILY, Disp: , Rfl: 2    fluticasone (FLONASE) 50 mcg/act nasal spray, 2 sprays into each nostril daily, Disp: 16 g, Rfl: 0    HUMIRA PEN-PSORIASIS STARTER 40 MG/0.8ML PNKT, 40 mg every 14 (fourteen) days  , Disp: , Rfl:     Probiotic Product (PROBIOTIC-10) CAPS, Take 2 capsules by mouth daily, Disp: , Rfl:     sertraline (ZOLOFT) 100 mg tablet, , Disp: , Rfl:     Current Allergies     Allergies as of 09/13/2024    (No Known Allergies)            The following portions of the patient's history were reviewed and updated as appropriate: allergies, current medications, past family history,  past medical history, past social history, past surgical history and problem list.     Past Medical History:   Diagnosis Date    Anxiety     Chronic kidney disease     R kidney stone    Colitis     Last Assessed: 5/18/2017    Depression     Diarrhea     Elevated ALT measurement     Last Assessed: 4/12/2017    Infectious mononucleosis     Irritable bowel syndrome with diarrhea     Last Assessed: 3/15/2017    Liver lesion     Last Assessed: 4/3/2017    Microscopic colitis     Migraine     not for 3 years    Mucus in stool     Last Assessed: 3/15/2017    Obesity     Psoriasis     Varicella        Past Surgical History:   Procedure Laterality Date    DENTAL SURGERY      tooth extraction    NO PAST SURGERIES      [place] COLONOSCOPY FLX DX W/COLLJ SPEC WHEN PFRMD N/A 5/18/2017    Procedure: EGD with bx AND COLONOSCOPY with bx;  Surgeon: Daniel Shah MD;  Location: [facility];  Service: Gastroenterology       Family History   Problem Relation Age of Onset    Hypertension Mother     Hypothyroidism Mother     Hypertension Father     Stroke Paternal Grandmother         Syndrome    Cancer Paternal Grandfather     Cancer Maternal Grandfather          Medications have been verified.        Objective   /90   Pulse 71   Temp (!) 97.2 °F (36.2 °C) (Tympanic)   Resp 20   SpO2 99%        Physical Exam     Physical Exam  Vitals and nursing note reviewed.   Constitutional:       General: She is not in acute distress.     Appearance: Normal appearance. She is not ill-appearing, toxic-appearing or diaphoretic.   HENT:      Head: Normocephalic and atraumatic.      Right Ear: Tympanic membrane normal.      Left Ear: Tympanic membrane normal.      Nose: Nose normal. No congestion or rhinorrhea.      Mouth/Throat:      Mouth: Mucous membranes are moist.   Eyes:      Extraocular Movements: Extraocular movements intact.      Conjunctiva/sclera: Conjunctivae normal.      Pupils: Pupils are equal, round, and reactive to light.    Cardiovascular:      Rate and Rhythm: Normal rate and regular rhythm.      Pulses: Normal pulses.      Heart sounds: Normal heart sounds. No murmur heard.     No friction rub. No gallop.   Pulmonary:      Effort: Pulmonary effort is normal. No respiratory distress.      Breath sounds: Normal breath sounds. No stridor. No wheezing, rhonchi or rales.   Abdominal:      General: Bowel sounds are normal.      Palpations: Abdomen is soft.      Tenderness: There is no abdominal tenderness. There is no guarding or rebound.   Musculoskeletal:      Cervical back: Normal range of motion and neck supple. No tenderness.      Right ankle: Swelling present. No deformity, ecchymosis or lacerations. Tenderness present. Decreased range of motion. Anterior drawer test negative. Normal pulse.      Right Achilles Tendon: Tenderness present. No defects. Montana's test negative.        Legs:       Comments: (+) Mild swelling and TTP of medial malleolus of right ankle.   (+) TTP of right calf, no calf swelling (bilateral calves measuring 16 cm).   No ecchymosis, bony deformity.    Skin:     General: Skin is warm and dry.      Capillary Refill: Capillary refill takes less than 2 seconds.   Neurological:      General: No focal deficit present.      Mental Status: She is alert and oriented to person, place, and time.      Cranial Nerves: No cranial nerve deficit.   Psychiatric:         Mood and Affect: Mood normal.         Behavior: Behavior normal.

## 2024-09-14 LAB
GAMMA INTERFERON BACKGROUND BLD IA-ACNC: <0 IU/ML
M TB IFN-G BLD-IMP: NEGATIVE
M TB IFN-G CD4+ BCKGRND COR BLD-ACNC: 0.02 IU/ML
M TB IFN-G CD4+ BCKGRND COR BLD-ACNC: 0.02 IU/ML
MITOGEN IGNF BCKGRD COR BLD-ACNC: 9.32 IU/ML

## 2024-09-14 NOTE — PATIENT INSTRUCTIONS
X-rays reviewed, no acute abnormality noted, awaiting official read.   Rest, ice, compress and elevate for pain/swelling.   Suspect acute strain of right calf muscle as a result of inversion injury to ankle, Well's score for DVT 0.   May apply heat, Biofreeze to calf, perform gentle stretches.   Follow up with PCP if no relief within one week.

## 2024-10-31 ENCOUNTER — OFFICE VISIT (OUTPATIENT)
Dept: URGENT CARE | Facility: CLINIC | Age: 49
End: 2024-10-31
Payer: COMMERCIAL

## 2024-10-31 VITALS
HEART RATE: 112 BPM | RESPIRATION RATE: 20 BRPM | SYSTOLIC BLOOD PRESSURE: 136 MMHG | OXYGEN SATURATION: 94 % | TEMPERATURE: 97.8 F | DIASTOLIC BLOOD PRESSURE: 90 MMHG

## 2024-10-31 DIAGNOSIS — R06.2 WHEEZING: Primary | ICD-10-CM

## 2024-10-31 DIAGNOSIS — J06.9 ACUTE URI: ICD-10-CM

## 2024-10-31 PROCEDURE — S9083 URGENT CARE CENTER GLOBAL: HCPCS

## 2024-10-31 PROCEDURE — G0382 LEV 3 HOSP TYPE B ED VISIT: HCPCS

## 2024-10-31 RX ORDER — IPRATROPIUM BROMIDE AND ALBUTEROL SULFATE 2.5; .5 MG/3ML; MG/3ML
3 SOLUTION RESPIRATORY (INHALATION) ONCE
Status: COMPLETED | OUTPATIENT
Start: 2024-10-31 | End: 2024-10-31

## 2024-10-31 RX ORDER — PREDNISONE 20 MG/1
40 TABLET ORAL DAILY
Qty: 10 TABLET | Refills: 0 | Status: SHIPPED | OUTPATIENT
Start: 2024-10-31 | End: 2024-11-05

## 2024-10-31 RX ADMIN — IPRATROPIUM BROMIDE AND ALBUTEROL SULFATE 3 ML: 2.5; .5 SOLUTION RESPIRATORY (INHALATION) at 18:54

## 2024-10-31 NOTE — PROGRESS NOTES
[facility]        NAME: Ronda Morin is a 49 y.o. female  : 1975    MRN: [id number]  DATE: 2024  TIME: 7:12 PM    Assessment and Plan   Wheezing [R06.2]  1. Wheezing  ipratropium-albuterol (DUO-NEB) 0.5-2.5 mg/3 mL inhalation solution 3 mL    predniSONE 20 mg tablet      2. Acute URI  predniSONE 20 mg tablet        DuoNeb in office.  O2 sats improved to 99% on room air.  Subjective symptoms significantly improved.    Patient Instructions       Follow up with PCP in 3-5 days.  Proceed to  ER if symptoms worsen.    If tests have been performed at [facility], our office will contact you with results if changes need to be made to the care plan discussed with you at the visit.  [facility].    Chief Complaint     Chief Complaint   Patient presents with    Cold Like Symptoms     Patient started to have a cough, nasal congestion, headache starting yesterday. This afternoon has trouble taking deep breaths and is audibly wheezing          History of Present Illness       49-year-old female presents for cold-like symptoms x 1 day.  Patient admits her daughter has the flu.  Woke up this morning symptom onset.  This afternoon worsening wheezing, chest tightness, cough.  Denies any known fevers.  Denies any prior lung problems.        Review of Systems   Review of Systems   Constitutional:  Negative for chills and fever.   HENT:  Positive for congestion, postnasal drip and rhinorrhea. Negative for ear pain and sore throat.    Respiratory:  Positive for cough, chest tightness and wheezing.          Current Medications       Current Outpatient Medications:     predniSONE 20 mg tablet, Take 2 tablets (40 mg total) by mouth daily for 5 days, Disp: 10 tablet, Rfl: 0    ALPRAZolam (XANAX) 0.5 mg tablet, TAKE 1 TABLET BY MOUTH TWICE A DAY AS NEEDED FOR ANXIETY, Disp: 60 tablet, Rfl: 0    calcium carbonate (OS-WALDO) 1250 (500 Ca) MG tablet, Take 1 tablet by mouth  daily, Disp: , Rfl:     cholecalciferol (VITAMIN D3) 1,000 units tablet, Take 1,000 Units by mouth daily, Disp: , Rfl:     clobetasol (TEMOVATE) 0.05 % external solution, APPLY TO SCALP TWICE DAILY, Disp: , Rfl: 2    fluticasone (FLONASE) 50 mcg/act nasal spray, 2 sprays into each nostril daily, Disp: 16 g, Rfl: 0    HUMIRA PEN-PSORIASIS STARTER 40 MG/0.8ML PNKT, 40 mg every 14 (fourteen) days  , Disp: , Rfl:     Probiotic Product (PROBIOTIC-10) CAPS, Take 2 capsules by mouth daily, Disp: , Rfl:     sertraline (ZOLOFT) 100 mg tablet, , Disp: , Rfl:   No current facility-administered medications for this visit.    Current Allergies     Allergies as of 10/31/2024    (No Known Allergies)            The following portions of the patient's history were reviewed and updated as appropriate: allergies, current medications, past family history, past medical history, past social history, past surgical history and problem list.     Past Medical History:   Diagnosis Date    Anxiety     Chronic kidney disease     R kidney stone    Colitis     Last Assessed: 5/18/2017    Depression     Diarrhea     Elevated ALT measurement     Last Assessed: 4/12/2017    Infectious mononucleosis     Irritable bowel syndrome with diarrhea     Last Assessed: 3/15/2017    Liver lesion     Last Assessed: 4/3/2017    Microscopic colitis     Migraine     not for 3 years    Mucus in stool     Last Assessed: 3/15/2017    Obesity     Psoriasis     Varicella        Past Surgical History:   Procedure Laterality Date    DENTAL SURGERY      tooth extraction    NO PAST SURGERIES      [place] COLONOSCOPY FLX DX W/COLLJ SPEC WHEN PFRMD N/A 5/18/2017    Procedure: EGD with bx AND COLONOSCOPY with bx;  Surgeon: Daniel Shah MD;  Location: [facility];  Service: Gastroenterology       Family History   Problem Relation Age of Onset    Hypertension Mother     Hypothyroidism Mother     Hypertension Father     Stroke Paternal Grandmother         Syndrome    Cancer Paternal  "Grandfather     Cancer Maternal Grandfather          Medications have been verified.        Objective   /90   Pulse (!) 112   Temp 97.8 °F (36.6 °C)   Resp 20   SpO2 94%   No LMP recorded.       Physical Exam     Physical Exam  Vitals and nursing note reviewed.   Constitutional:       General: She is not in acute distress.     Appearance: She is not toxic-appearing.   HENT:      Head: Normocephalic and atraumatic.      Right Ear: Tympanic membrane, ear canal and external ear normal.      Left Ear: Tympanic membrane, ear canal and external ear normal.      Nose: Congestion and rhinorrhea present.      Mouth/Throat:      Mouth: Mucous membranes are moist.      Pharynx: No oropharyngeal exudate or posterior oropharyngeal erythema.   Eyes:      Conjunctiva/sclera: Conjunctivae normal.   Cardiovascular:      Rate and Rhythm: Regular rhythm. Tachycardia present.   Pulmonary:      Effort: Pulmonary effort is normal.      Breath sounds: Wheezing present.   Lymphadenopathy:      Cervical: No cervical adenopathy.   Neurological:      Mental Status: She is alert.   Psychiatric:         Mood and Affect: Mood normal.         Behavior: Behavior normal.             Mini neb    Performed by: Ady Montague PA-C  Authorized by: Ady Montague PA-C  Universal Protocol:  procedure performed by consultantConsent: Verbal consent obtained.  Risks and benefits: risks, benefits and alternatives were discussed  Consent given by: patient  Time out: Immediately prior to procedure a \"time out\" was called to verify the correct patient, procedure, equipment, support staff and site/side marked as required.  Patient understanding: patient states understanding of the procedure being performed    Number of treatments:  1  Treatment 1:   Pre-Procedure     Symptoms:  Wheezing    Medication Administered:  Duoneb - Albuterol 2.5 mg/Atrovent 0.5 mg         "

## 2024-12-11 ENCOUNTER — OFFICE VISIT (OUTPATIENT)
Dept: URGENT CARE | Facility: MEDICAL CENTER | Age: 49
End: 2024-12-11
Payer: COMMERCIAL

## 2024-12-11 VITALS
TEMPERATURE: 98.5 F | DIASTOLIC BLOOD PRESSURE: 83 MMHG | RESPIRATION RATE: 18 BRPM | OXYGEN SATURATION: 99 % | HEART RATE: 98 BPM | SYSTOLIC BLOOD PRESSURE: 138 MMHG

## 2024-12-11 DIAGNOSIS — J22 LOWER RESPIRATORY INFECTION: Primary | ICD-10-CM

## 2024-12-11 PROCEDURE — S9083 URGENT CARE CENTER GLOBAL: HCPCS

## 2024-12-11 PROCEDURE — G0382 LEV 3 HOSP TYPE B ED VISIT: HCPCS

## 2024-12-11 RX ORDER — TIRZEPATIDE 5 MG/.5ML
INJECTION, SOLUTION SUBCUTANEOUS
COMMUNITY
Start: 2024-11-30

## 2024-12-11 RX ORDER — TIRZEPATIDE 2.5 MG/.5ML
INJECTION, SOLUTION SUBCUTANEOUS
COMMUNITY
Start: 2024-10-08

## 2024-12-11 RX ORDER — METHYLPHENIDATE HYDROCHLORIDE 36 MG/1
TABLET ORAL
COMMUNITY
Start: 2024-11-04

## 2024-12-11 NOTE — PROGRESS NOTES
[facility]        NAME: Ronda Morin is a 49 y.o. female  : 1975    MRN: [id number]  DATE: 2024  TIME: 6:13 PM    Assessment and Plan   Lower respiratory infection [J22]  1. Lower respiratory infection  amoxicillin-clavulanate (AUGMENTIN) 875-125 mg per tablet        Concerns for possible lower respiratory bacterial infection given length of time of symptoms, no improvement.  Prescribed course of Augmentin.  Advised continuing symptomatic treatment.  Discussed if no improvement within the next 5 days on the antibiotic, call PCP for further evaluation.    Patient Instructions     Prescribed course of Augmentin, take as directed.  Continue with symptomatic treatment as below.  Fever/Pain: Over the counter Tylenol and/or Motrin as directed on packaging.  Cough: Mucinex can help to thin mucus, making it easier to cough up. Delsym or Robitussin can help to suppress the cough. Utilizing a vaporizer/humidifier may help, as well as increasing fluids.  Sore Throat: Salt water gargles with 1 teaspoon of salt dissolved in 6-8 oz of water, honey, drinking plenty of liquids, eating soft foods. If severe, can utilize OTC chloraseptic spray.  Nasal Congestion: Over the counter allergy medication like Claritin, Allegra or Zyrtec can help with nasal congestion and post nasal drip.  Over the counter saline or steroid nasal sprays like Flonase can help with nasal congestion and post nasal drip as well. Over the counter decongestants such as Sudafed may also help.  Upset Stomach: Drink plenty of fluids. May utilize Gatorade, Pedialyte, or other electrolyte solutions to supplement. Eat bland foods (ex: BRAT - bananas, rice, applesauce, toast) and slowly advance to other foods as tolerated.  Please note, if you have heart issues or high blood pressure, the cough/cold medication of choice is Coricidin. Talk to your doctor before trying any new medications.    Follow up with PCP in 3-5 days.  Proceed to  ER  if symptoms worsen.    If tests are performed, our office will contact you with results only if changes need to made to the care plan discussed with you at the visit. You can review your full results on [facility].    Chief Complaint     Chief Complaint   Patient presents with    Cough     Patient c/o wet cough and chest congestion x 1 month         History of Present Illness       Patient presents for evaluation of upper respiratory symptoms that have been present since the end of October.  States overall the cough seems to be worsening.  She does not get any significant relief from the cough with over-the-counter medication.  She was seen at the end of October and given a breathing treatment and prescribed a course of prednisone.  She took the prednisone as prescribed, did not seem to improve the cough.    URI   This is a new problem. The current episode started more than 1 month ago. The problem has been gradually worsening (cough increasing in frequency). There has been no fever. Associated symptoms include congestion, coughing, nausea (every once in a while), a plugged ear sensation (fullness), rhinorrhea, a sore throat and wheezing. Pertinent negatives include no abdominal pain, chest pain, diarrhea, ear pain, rash, sinus pain or vomiting. Treatments tried: Flonase, Mucinex, Sudafed, Zyrtec, nasal rinse, gargling salt water, prednisone burst. The treatment provided no relief.       Review of Systems   Review of Systems   Constitutional:  Positive for appetite change (decreased) and fatigue. Negative for chills and fever.   HENT:  Positive for congestion, postnasal drip, rhinorrhea and sore throat. Negative for ear discharge, ear pain, sinus pressure and sinus pain.    Eyes:  Negative for pain, discharge, redness and itching.   Respiratory:  Positive for cough and wheezing. Negative for chest tightness and shortness of breath.    Cardiovascular:  Negative for chest pain.   Gastrointestinal:  Positive  for nausea (every once in a while). Negative for abdominal pain, diarrhea and vomiting.   Musculoskeletal:  Negative for myalgias.   Skin:  Negative for rash.         Current Medications       Current Outpatient Medications:     amoxicillin-clavulanate (AUGMENTIN) 875-125 mg per tablet, Take 1 tablet by mouth every 12 (twelve) hours for 7 days, Disp: 14 tablet, Rfl: 0    methylphenidate (CONCERTA) 36 MG ER tablet, , Disp: , Rfl:     Zepbound 2.5 MG/0.5ML auto-injector, ADMINISTER 2.5 MG UNDER THE SKIN EVERY WEEK. DO NOT FILL IF NOT PICKED UP WITHIN 30 DAYS OR, Disp: , Rfl:     Zepbound 5 MG/0.5ML auto-injector, ADMINISTER 5 MG UNDER THE SKIN EVERY WEEK. DO NOT FILL OR, Disp: , Rfl:     ALPRAZolam (XANAX) 0.5 mg tablet, TAKE 1 TABLET BY MOUTH TWICE A DAY AS NEEDED FOR ANXIETY, Disp: 60 tablet, Rfl: 0    calcium carbonate (OS-WALDO) 1250 (500 Ca) MG tablet, Take 1 tablet by mouth daily, Disp: , Rfl:     cholecalciferol (VITAMIN D3) 1,000 units tablet, Take 1,000 Units by mouth daily, Disp: , Rfl:     clobetasol (TEMOVATE) 0.05 % external solution, APPLY TO SCALP TWICE DAILY, Disp: , Rfl: 2    fluticasone (FLONASE) 50 mcg/act nasal spray, 2 sprays into each nostril daily, Disp: 16 g, Rfl: 0    HUMIRA PEN-PSORIASIS STARTER 40 MG/0.8ML PNKT, 40 mg every 14 (fourteen) days  , Disp: , Rfl:     Probiotic Product (PROBIOTIC-10) CAPS, Take 2 capsules by mouth daily, Disp: , Rfl:     sertraline (ZOLOFT) 100 mg tablet, , Disp: , Rfl:     Current Allergies     Allergies as of 12/11/2024    (No Known Allergies)            The following portions of the patient's history were reviewed and updated as appropriate: allergies, current medications, past family history, past medical history, past social history, past surgical history and problem list.     Past Medical History:   Diagnosis Date    Anxiety     Chronic kidney disease     R kidney stone    Colitis     Last Assessed: 5/18/2017    Depression     Diarrhea     Elevated ALT  measurement     Last Assessed: 4/12/2017    Infectious mononucleosis     Irritable bowel syndrome with diarrhea     Last Assessed: 3/15/2017    Liver lesion     Last Assessed: 4/3/2017    Microscopic colitis     Migraine     not for 3 years    Mucus in stool     Last Assessed: 3/15/2017    Obesity     Psoriasis     Varicella        Past Surgical History:   Procedure Laterality Date    DENTAL SURGERY      tooth extraction    NO PAST SURGERIES      ME COLONOSCOPY FLX DX W/COLLJ SPEC WHEN PFRMD N/A 5/18/2017    Procedure: EGD with bx AND COLONOSCOPY with bx;  Surgeon: Daniel Shah MD;  Location: [facility];  Service: Gastroenterology       Family History   Problem Relation Age of Onset    Hypertension Mother     Hypothyroidism Mother     Hypertension Father     Stroke Paternal Grandmother         Syndrome    Cancer Paternal Grandfather     Cancer Maternal Grandfather          Medications have been verified.        Objective   /83   Pulse 98   Temp 98.5 °F (36.9 °C)   Resp 18   SpO2 99%        Physical Exam     Physical Exam  Vitals and nursing note reviewed.   Constitutional:       General: She is not in acute distress.     Appearance: Normal appearance. She is not ill-appearing.   HENT:      Right Ear: Tympanic membrane, ear canal and external ear normal.      Left Ear: Tympanic membrane, ear canal and external ear normal.      Nose: Nose normal. No congestion or rhinorrhea.      Mouth/Throat:      Mouth: Mucous membranes are moist.      Pharynx: No oropharyngeal exudate or posterior oropharyngeal erythema.   Eyes:      General:         Right eye: No discharge.         Left eye: No discharge.      Extraocular Movements: Extraocular movements intact.   Cardiovascular:      Rate and Rhythm: Normal rate and regular rhythm.      Pulses: Normal pulses.      Heart sounds: Normal heart sounds.   Pulmonary:      Effort: Pulmonary effort is normal. No respiratory distress.      Breath sounds: Normal breath sounds.       Comments: + diffuse crackling  Abdominal:      General: Abdomen is flat. Bowel sounds are normal. There is no distension.      Palpations: Abdomen is soft.      Tenderness: There is no abdominal tenderness. There is no guarding.   Musculoskeletal:      Cervical back: Neck supple.   Lymphadenopathy:      Cervical: No cervical adenopathy.   Skin:     General: Skin is warm and dry.   Neurological:      Mental Status: She is alert.

## 2024-12-11 NOTE — PATIENT INSTRUCTIONS
"Prescribed course of Augmentin, take as directed.  Continue with symptomatic treatment as below.  Fever/Pain: Over the counter Tylenol and/or Motrin as directed on packaging.  Cough: Mucinex can help to thin mucus, making it easier to cough up. Delsym or Robitussin can help to suppress the cough. Utilizing a vaporizer/humidifier may help, as well as increasing fluids.  Sore Throat: Salt water gargles with 1 teaspoon of salt dissolved in 6-8 oz of water, honey, drinking plenty of liquids, eating soft foods. If severe, can utilize OTC chloraseptic spray.  Nasal Congestion: Over the counter allergy medication like Claritin, Allegra or Zyrtec can help with nasal congestion and post nasal drip.  Over the counter saline or steroid nasal sprays like Flonase can help with nasal congestion and post nasal drip as well. Over the counter decongestants such as Sudafed may also help.  Upset Stomach: Drink plenty of fluids. May utilize Gatorade, Pedialyte, or other electrolyte solutions to supplement. Eat bland foods (ex: BRAT - bananas, rice, applesauce, toast) and slowly advance to other foods as tolerated.  Please note, if you have heart issues or high blood pressure, the cough/cold medication of choice is Coricidin. Talk to your doctor before trying any new medications.    Follow up with PCP in 3-5 days.  Proceed to  ER if symptoms worsen.    If tests are performed, our office will contact you with results only if changes need to made to the care plan discussed with you at the visit. You can review your full results on [facility].    Patient Education     Cough in adults   The Basics   Written by the doctors and editors at [facility]   What is a cough? -- A cough is an important reflex that helps clear out the body's airways. The airways include the windpipe, or \"trachea,\" and the bronchi, which are the tubes that carry air within the lungs. Coughing helps keep people from breathing things into the airways and lungs, " "which could cause problems (figure 1).  It is normal for people to cough once in a while. But sometimes, a cough is a symptom of an illness or condition.  Some coughs are called \"dry\" coughs, because they don't bring up mucus (phlegm). Other coughs are called \"wet\" or \"productive\" coughs, because they do bring up mucus. Some coughs are mild and don't cause serious problems. Other coughs are severe and can cause trouble breathing.  What causes a cough? -- In adults, common causes of a cough include:   Viral infections - These include the common cold, the flu, and COVID-19. Usually, a cough caused by a viral infection will only last for a week or 2, but sometimes, it can last longer.   Smoking cigarettes or vaping   Postnasal drip - Postnasal drip is when mucus from the nose drips down or flows along the back of the throat. Postnasal drip can happen when people have:   A cold   Allergies   A sinus infection   Lung conditions - Lung problems like asthma and chronic obstructive pulmonary disease (\"COPD\") can make it hard to breathe. COPD is usually caused by smoking.   Acid reflux - Acid reflux is when the acid that is normally in your stomach backs up into your esophagus (the tube that carries food from your mouth to your stomach).   A side effect from blood pressure medicines called \"ACE inhibitors\"  Will I need tests? -- Maybe. If you see a doctor for your cough, they will talk with you and do an exam. Based on your symptoms and other factors, they might decide that you need tests. These might include:   A swab from your inside of your nose - This can be tested for the virus that causes COVID-19.   A chest X-ray   Breathing tests - Breathing tests involve breathing hard into a tube. These tests show how your lungs are working.   Allergy skin tests to find out what you're allergic to - For a skin test, the doctor puts a drop of the substance that you might be allergic to on your skin and makes a tiny prick in your " "skin. Then, they watch your skin to see if it gets red and bumpy.   A CT scan of your chest or sinuses - A CT scan is an imaging test that creates pictures of the inside of the body.   Lab tests on a sample of the mucus that you cough up   Using a \"scope\" to look inside of your nose, sinuses, airway, or lungs   Tests to check for acid reflux - These usually involve having a thin tube put in your mouth and down into your esophagus.  How can I care for myself at home?    If your cough is from a cold, you can use a cool mist humidifier in your bedroom.   Suck on cough drops or hard candy.   Drink warm liquids, like tea, to soothe your throat.   Avoid smoking and places where other people are smoking.   If you have allergies, avoid the things that you are allergic to. This might include pollen, dust, animals, or mold.   If you are coughing up mucus, try an over-the-counter cold and cough medicine. These medicines can thin mucus and sometimes reduce the urge to cough.   If you have acid reflux, your doctor or nurse will talk to you about how to reduce symptoms.  How is a cough treated? -- Treatment depends on the cause of your cough. For example:   Some infections are treated with antibiotic medicines. If an infection is caused by bacteria, doctors can treat it with antibiotics. If the infection is caused by a virus (such as the common cold), antibiotics will not help. For some viral infections, like the flu or COVID-19, there might be other medicines that can help.   Postnasal drip is treated with different kinds of medicines that can come as a pill or nose spray.   Asthma and COPD are usually treated with medicines that people breathe into their lungs. These are called \"inhaler medicines.\"   Acid reflux can be treated with medicine to reduce or block stomach acid.   If you have a cough as a side effect from an ACE inhibitor, your doctor can switch your medicine.  If the cause of your cough is not clear, your doctor " "might prescribe medicine to make your cough less severe. But these medicines have side effects, and doctors usually recommend them only if nothing else has worked.  When should I call the doctor? -- Call your doctor or nurse if:   You have trouble breathing or noisy breathing (wheezing).   You have a fever or chest pain.   You cough up blood, or yellow or green mucus.   You cough so hard that it makes you throw up.   Your cough gets worse or lasts longer than 14 days.   You have a cough and have lost weight without trying to.  All topics are updated as new evidence becomes available and our peer review process is complete.  This topic retrieved from [employer] on: Feb 26, 2024.  Topic 01752 Version 16.0  Release: 32.2.4 - C32.56  © 2024 UpToDate, Inc. and/or its affiliates. All rights reserved.  figure 1: Normal lungs     The lungs sit in the chest, inside the ribcage. They are covered with a thin membrane called the \"pleura.\" The windpipe, or trachea, branches into 2 smaller airways called the left and right \"bronchi.\" The space between the lungs is called the \"mediastinum.\" Lymph nodes are located within and around the lungs and mediastinum.  Graphic 16069 Version 14.0  Consumer Information Use and Disclaimer   Disclaimer: This generalized information is a limited summary of diagnosis, treatment, and/or medication information. It is not meant to be comprehensive and should be used as a tool to help the user understand and/or assess potential diagnostic and treatment options. It does NOT include all information about conditions, treatments, medications, side effects, or risks that may apply to a specific patient. It is not intended to be medical advice or a substitute for the medical advice, diagnosis, or treatment of a health care provider based on the health care provider's examination and assessment of a patient's specific and unique circumstances. Patients must speak with a health care provider for complete " information about their health, medical questions, and treatment options, including any risks or benefits regarding use of medications. This information does not endorse any treatments or medications as safe, effective, or approved for treating a specific patient. UpToDate, Inc. and its affiliates disclaim any warranty or liability relating to this information or the use thereof.The use of this information is governed by the Terms of Use, available at https://www.[employer].com/en/know/clinical-effectiveness-terms. 2024© UpToDate, Inc. and its affiliates and/or licensors. All rights reserved.  Copyright   © 2024 UpToDate, Inc. and/or its affiliates. All rights reserved.

## 2025-07-31 ENCOUNTER — OFFICE VISIT (OUTPATIENT)
Dept: FAMILY MEDICINE CLINIC | Facility: CLINIC | Age: 50
End: 2025-07-31
Payer: COMMERCIAL

## 2025-07-31 VITALS
HEART RATE: 77 BPM | WEIGHT: 157 LBS | BODY MASS INDEX: 26.8 KG/M2 | HEIGHT: 64 IN | SYSTOLIC BLOOD PRESSURE: 122 MMHG | OXYGEN SATURATION: 99 % | DIASTOLIC BLOOD PRESSURE: 80 MMHG | RESPIRATION RATE: 18 BRPM | TEMPERATURE: 97.8 F

## 2025-07-31 DIAGNOSIS — E78.2 MIXED HYPERLIPIDEMIA: ICD-10-CM

## 2025-07-31 DIAGNOSIS — N64.4 PAINFUL LUMPY LEFT BREAST: Primary | ICD-10-CM

## 2025-07-31 DIAGNOSIS — F32.4 MAJOR DEPRESSIVE DISORDER WITH SINGLE EPISODE, IN PARTIAL REMISSION (HCC): ICD-10-CM

## 2025-07-31 DIAGNOSIS — Z12.31 BREAST CANCER SCREENING BY MAMMOGRAM: ICD-10-CM

## 2025-07-31 DIAGNOSIS — R73.01 IFG (IMPAIRED FASTING GLUCOSE): ICD-10-CM

## 2025-07-31 DIAGNOSIS — N63.20 PAINFUL LUMPY LEFT BREAST: Primary | ICD-10-CM

## 2025-07-31 DIAGNOSIS — E66.3 OVERWEIGHT (BMI 25.0-29.9): ICD-10-CM

## 2025-07-31 PROCEDURE — 99204 OFFICE O/P NEW MOD 45 MIN: CPT | Performed by: NURSE PRACTITIONER

## (undated) DEVICE — SINGLE-USE BIOPSY FORCEPS: Brand: RADIAL JAW 4